# Patient Record
Sex: FEMALE | Race: BLACK OR AFRICAN AMERICAN | NOT HISPANIC OR LATINO | Employment: UNEMPLOYED | URBAN - METROPOLITAN AREA
[De-identification: names, ages, dates, MRNs, and addresses within clinical notes are randomized per-mention and may not be internally consistent; named-entity substitution may affect disease eponyms.]

---
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
## 2019-01-30 PROBLEM — A74.9 CHLAMYDIA: Status: ACTIVE | Noted: 2019-01-30

## 2019-02-25 ENCOUNTER — HOSPITAL ENCOUNTER (EMERGENCY)
Facility: HOSPITAL | Age: 19
Discharge: HOME | End: 2019-02-25
Attending: SURGERY
Payer: MEDICAID

## 2019-02-25 VITALS
TEMPERATURE: 98 F | HEART RATE: 88 BPM | SYSTOLIC BLOOD PRESSURE: 136 MMHG | RESPIRATION RATE: 17 BRPM | DIASTOLIC BLOOD PRESSURE: 74 MMHG

## 2019-02-25 DIAGNOSIS — Z20.2 POSSIBLE EXPOSURE TO STD: ICD-10-CM

## 2019-02-25 DIAGNOSIS — N89.8 VAGINAL DISCHARGE: Primary | ICD-10-CM

## 2019-02-25 LAB
B-HCG UR QL: NEGATIVE
BILIRUB UR QL STRIP: NEGATIVE
CLARITY UR: CLEAR
COLOR UR: YELLOW
GLUCOSE UR QL STRIP: NEGATIVE
HGB UR QL STRIP: NEGATIVE
KETONES UR QL STRIP: NEGATIVE
LEUKOCYTE ESTERASE UR QL STRIP: NEGATIVE
NITRITE UR QL STRIP: NEGATIVE
PH UR STRIP: 6 [PH] (ref 5–8)
PROT UR QL STRIP: NEGATIVE
SP GR UR STRIP: >=1.03 (ref 1–1.03)
URN SPEC COLLECT METH UR: ABNORMAL
UROBILINOGEN UR STRIP-ACNC: NEGATIVE EU/DL

## 2019-02-25 PROCEDURE — 81003 URINALYSIS AUTO W/O SCOPE: CPT

## 2019-02-25 PROCEDURE — 99284 EMERGENCY DEPT VISIT MOD MDM: CPT | Mod: 25

## 2019-02-25 PROCEDURE — 81025 URINE PREGNANCY TEST: CPT

## 2019-02-25 PROCEDURE — 63600175 PHARM REV CODE 636 W HCPCS: Performed by: NURSE PRACTITIONER

## 2019-02-25 PROCEDURE — 87070 CULTURE OTHR SPECIMN AEROBIC: CPT

## 2019-02-25 PROCEDURE — 96372 THER/PROPH/DIAG INJ SC/IM: CPT

## 2019-02-25 PROCEDURE — 25000003 PHARM REV CODE 250: Performed by: NURSE PRACTITIONER

## 2019-02-25 RX ORDER — METRONIDAZOLE 500 MG/1
500 TABLET ORAL EVERY 12 HOURS
Qty: 14 TABLET | Refills: 0 | Status: SHIPPED | OUTPATIENT
Start: 2019-02-25 | End: 2019-03-04

## 2019-02-25 RX ORDER — AZITHROMYCIN 250 MG/1
1000 TABLET, FILM COATED ORAL
Status: COMPLETED | OUTPATIENT
Start: 2019-02-25 | End: 2019-02-25

## 2019-02-25 RX ORDER — CEFTRIAXONE 500 MG/1
250 INJECTION, POWDER, FOR SOLUTION INTRAMUSCULAR; INTRAVENOUS
Status: COMPLETED | OUTPATIENT
Start: 2019-02-25 | End: 2019-02-25

## 2019-02-25 RX ADMIN — AZITHROMYCIN 1000 MG: 250 TABLET, FILM COATED ORAL at 02:02

## 2019-02-25 RX ADMIN — CEFTRIAXONE SODIUM 250 MG: 500 INJECTION, POWDER, FOR SOLUTION INTRAMUSCULAR; INTRAVENOUS at 02:02

## 2019-02-25 NOTE — ED NOTES
Discharged to home/self care.    - Condition at discharge: Good  - Mode of Discharge: Ambulatory  - The patient left the ED  By herself  - The discharge instructions were discussed with the patient.  - She stated an understanding of the discharge instructions.  - Walked pt to the discharge station.

## 2019-02-25 NOTE — ED PROVIDER NOTES
Encounter Date: 2/25/2019       History     Chief Complaint   Patient presents with    Vaginal Discharge     The history is provided by the patient.   Vaginal Discharge   This is a recurrent problem. Episode onset: Today. The problem has not changed since onset.Pertinent negatives include no chest pain, no abdominal pain, no headaches and no shortness of breath.     Yellow vaginal discharge. Patient reports that she was treated for gonorrhea and chlamydia last month.  She reports that her sex partner was also treated; however, she is requesting prophylactic treatment at this time.  She does not want to wait for her results to come back and states that she would rather get treated today.  She has no fever.  No tachycardia.  No abdominal pain. She had a pelvic exam performed by Ob/gyn last month.    Review of patient's allergies indicates:  No Known Allergies  Past Medical History:   Diagnosis Date    ADHD (attention deficit hyperactivity disorder)     Depression     History of psychiatric hospitalization     Hx of psychiatric care     Psychiatric problem     Suicide attempt     Therapy      No past surgical history on file.  No family history on file.  Social History     Tobacco Use    Smoking status: Current Some Day Smoker     Packs/day: 0.25    Smokeless tobacco: Never Used   Substance Use Topics    Alcohol use: No    Drug use: No     Review of Systems   Constitutional: Negative for fever.   HENT: Negative for congestion, ear pain, rhinorrhea, sore throat and trouble swallowing.    Eyes: Negative for pain, discharge, redness and visual disturbance.   Respiratory: Negative for cough, shortness of breath and wheezing.    Cardiovascular: Negative for chest pain and leg swelling.   Gastrointestinal: Negative for abdominal pain, constipation, diarrhea, nausea and vomiting.   Genitourinary: Positive for vaginal discharge (Yellow). Negative for difficulty urinating, dysuria, flank pain, frequency, genital  sores, pelvic pain, urgency and vaginal pain.   Musculoskeletal: Negative for arthralgias, back pain, myalgias and neck pain.   Skin: Negative for rash and wound.   Neurological: Negative for seizures, weakness and headaches.   Psychiatric/Behavioral: Negative.        Physical Exam     Initial Vitals [02/25/19 1325]   BP Pulse Resp Temp SpO2   138/78 91 20 97.6 °F (36.4 °C) --      MAP       --         Physical Exam    Nursing note and vitals reviewed.  Constitutional: No distress.   HENT:   Head: Normocephalic and atraumatic.   Right Ear: External ear normal.   Left Ear: External ear normal.   Nose: Nose normal.   Mouth/Throat: Oropharynx is clear and moist.   Eyes: Conjunctivae, EOM and lids are normal. Pupils are equal, round, and reactive to light.   Neck: Neck supple.   Cardiovascular: Normal rate, regular rhythm, S1 normal, S2 normal, normal heart sounds and intact distal pulses.   Pulmonary/Chest: Effort normal and breath sounds normal. No respiratory distress.   Abdominal: Soft. Bowel sounds are normal. There is no tenderness.   Genitourinary: There is no rash, tenderness or lesion on the right labia. There is no rash, tenderness or lesion on the left labia. Cervix exhibits discharge (Thin, gray, malodorous). Cervix exhibits no motion tenderness and no friability. Right adnexum displays no tenderness. Left adnexum displays no tenderness.   Musculoskeletal: Normal range of motion.   Neurological: She is alert and oriented to person, place, and time. She has normal strength. GCS eye subscore is 4. GCS verbal subscore is 5. GCS motor subscore is 6.   Skin: Skin is warm and dry. Capillary refill takes less than 2 seconds. No rash noted.   Psychiatric: She has a normal mood and affect. Her speech is normal and behavior is normal.         ED Course   Procedures  Labs Reviewed   URINALYSIS, REFLEX TO URINE CULTURE - Abnormal; Notable for the following components:       Result Value    Specific Gravity, UA >=1.030  (*)     All other components within normal limits    Narrative:     Preferred Collection Type->Urine, Clean Catch   GENITAL CULTURE   C. TRACHOMATIS/N. GONORRHOEAE BY AMP DNA   PREGNANCY TEST, URINE RAPID                      Medications   azithromycin tablet 1,000 mg (not administered)   cefTRIAXone injection 250 mg (not administered)                      Clinical Impression:       ICD-10-CM ICD-9-CM   1. Vaginal discharge N89.8 623.5   2. Possible exposure to STD Z20.2 V01.6     New Prescriptions    METRONIDAZOLE (FLAGYL) 500 MG TABLET    Take 1 tablet (500 mg total) by mouth every 12 (twelve) hours. for 7 days       Disposition:   Disposition: Discharged  Condition: Stable    The patient acknowledges that close follow up with medical provider is required. Instructed to follow up with gyn within 2 days. Patient was given specific return precautions. The patient agrees to comply with all instruction and directions given in the ER.                       Prerna Garcia NP  02/25/19 6889

## 2019-03-01 LAB — BACTERIA GENITAL AEROBE CULT: NORMAL

## 2019-08-13 ENCOUNTER — HOSPITAL ENCOUNTER (EMERGENCY)
Facility: HOSPITAL | Age: 19
Discharge: PSYCHIATRIC OR SUBSTANCE USE FACILITY | End: 2019-08-13
Attending: SURGERY
Payer: MEDICAID

## 2019-08-13 VITALS
BODY MASS INDEX: 32.65 KG/M2 | RESPIRATION RATE: 20 BRPM | WEIGHT: 196.19 LBS | HEART RATE: 78 BPM | SYSTOLIC BLOOD PRESSURE: 130 MMHG | DIASTOLIC BLOOD PRESSURE: 70 MMHG | TEMPERATURE: 97 F

## 2019-08-13 DIAGNOSIS — T14.91XA SUICIDAL BEHAVIOR WITH ATTEMPTED SELF-INJURY: Primary | ICD-10-CM

## 2019-08-13 PROBLEM — F32.A DEPRESSION: Status: ACTIVE | Noted: 2019-08-13

## 2019-08-13 LAB
ALBUMIN SERPL BCP-MCNC: 3.7 G/DL (ref 3.5–5.2)
ALP SERPL-CCNC: 63 U/L (ref 55–135)
ALT SERPL W/O P-5'-P-CCNC: 7 U/L (ref 10–44)
AMPHET+METHAMPHET UR QL: NEGATIVE
ANION GAP SERPL CALC-SCNC: 7 MMOL/L (ref 8–16)
APAP SERPL-MCNC: <3 UG/ML (ref 10–20)
AST SERPL-CCNC: 11 U/L (ref 10–40)
B-HCG UR QL: NEGATIVE
BACTERIA #/AREA URNS HPF: ABNORMAL /HPF
BARBITURATES UR QL SCN>200 NG/ML: NEGATIVE
BASOPHILS # BLD AUTO: 0.03 K/UL (ref 0–0.2)
BASOPHILS NFR BLD: 0.7 % (ref 0–1.9)
BENZODIAZ UR QL SCN>200 NG/ML: NEGATIVE
BILIRUB SERPL-MCNC: 0.5 MG/DL (ref 0.1–1)
BILIRUB UR QL STRIP: NEGATIVE
BUN SERPL-MCNC: 9 MG/DL (ref 6–20)
BZE UR QL SCN: NEGATIVE
CALCIUM SERPL-MCNC: 9.3 MG/DL (ref 8.7–10.5)
CANNABINOIDS UR QL SCN: NEGATIVE
CHLORIDE SERPL-SCNC: 108 MMOL/L (ref 95–110)
CLARITY UR: ABNORMAL
CO2 SERPL-SCNC: 24 MMOL/L (ref 23–29)
COLOR UR: YELLOW
CREAT SERPL-MCNC: 0.7 MG/DL (ref 0.5–1.4)
CREAT UR-MCNC: 210.9 MG/DL (ref 15–325)
DIFFERENTIAL METHOD: ABNORMAL
EOSINOPHIL # BLD AUTO: 0.1 K/UL (ref 0–0.5)
EOSINOPHIL NFR BLD: 2 % (ref 0–8)
ERYTHROCYTE [DISTWIDTH] IN BLOOD BY AUTOMATED COUNT: 14.7 % (ref 11.5–14.5)
EST. GFR  (AFRICAN AMERICAN): >60 ML/MIN/1.73 M^2
EST. GFR  (NON AFRICAN AMERICAN): >60 ML/MIN/1.73 M^2
ETHANOL SERPL-MCNC: <10 MG/DL
GLUCOSE SERPL-MCNC: 84 MG/DL (ref 70–110)
GLUCOSE UR QL STRIP: NEGATIVE
HCT VFR BLD AUTO: 31.4 % (ref 37–48.5)
HGB BLD-MCNC: 9.7 G/DL (ref 12–16)
HGB UR QL STRIP: ABNORMAL
IMM GRANULOCYTES # BLD AUTO: 0.01 K/UL (ref 0–0.04)
IMM GRANULOCYTES NFR BLD AUTO: 0.2 % (ref 0–0.5)
KETONES UR QL STRIP: NEGATIVE
LEUKOCYTE ESTERASE UR QL STRIP: NEGATIVE
LYMPHOCYTES # BLD AUTO: 1.2 K/UL (ref 1–4.8)
LYMPHOCYTES NFR BLD: 29 % (ref 18–48)
MCH RBC QN AUTO: 24.3 PG (ref 27–31)
MCHC RBC AUTO-ENTMCNC: 30.9 G/DL (ref 32–36)
MCV RBC AUTO: 79 FL (ref 82–98)
METHADONE UR QL SCN>300 NG/ML: NEGATIVE
MICROSCOPIC COMMENT: ABNORMAL
MONOCYTES # BLD AUTO: 0.3 K/UL (ref 0.3–1)
MONOCYTES NFR BLD: 7.2 % (ref 4–15)
NEUTROPHILS # BLD AUTO: 2.5 K/UL (ref 1.8–7.7)
NEUTROPHILS NFR BLD: 60.9 % (ref 38–73)
NITRITE UR QL STRIP: NEGATIVE
NRBC BLD-RTO: 0 /100 WBC
OPIATES UR QL SCN: NEGATIVE
PCP UR QL SCN>25 NG/ML: NEGATIVE
PH UR STRIP: 6 [PH] (ref 5–8)
PLATELET # BLD AUTO: 330 K/UL (ref 150–350)
PMV BLD AUTO: 10.2 FL (ref 9.2–12.9)
POTASSIUM SERPL-SCNC: 3.9 MMOL/L (ref 3.5–5.1)
PROT SERPL-MCNC: 7.5 G/DL (ref 6–8.4)
PROT UR QL STRIP: NEGATIVE
RBC # BLD AUTO: 3.99 M/UL (ref 4–5.4)
RBC #/AREA URNS HPF: >100 /HPF (ref 0–4)
SALICYLATES SERPL-MCNC: <5 MG/DL (ref 15–30)
SODIUM SERPL-SCNC: 139 MMOL/L (ref 136–145)
SP GR UR STRIP: >=1.03 (ref 1–1.03)
T4 FREE SERPL-MCNC: 0.89 NG/DL (ref 0.71–1.51)
TOXICOLOGY INFORMATION: NORMAL
TSH SERPL DL<=0.005 MIU/L-ACNC: 0.35 UIU/ML (ref 0.4–4)
URN SPEC COLLECT METH UR: ABNORMAL
UROBILINOGEN UR STRIP-ACNC: NEGATIVE EU/DL
WBC # BLD AUTO: 4.04 K/UL (ref 3.9–12.7)
WBC #/AREA URNS HPF: 0 /HPF (ref 0–5)

## 2019-08-13 PROCEDURE — 80320 DRUG SCREEN QUANTALCOHOLS: CPT

## 2019-08-13 PROCEDURE — 80307 DRUG TEST PRSMV CHEM ANLYZR: CPT

## 2019-08-13 PROCEDURE — 99285 EMERGENCY DEPT VISIT HI MDM: CPT

## 2019-08-13 PROCEDURE — 36415 COLL VENOUS BLD VENIPUNCTURE: CPT

## 2019-08-13 PROCEDURE — 81025 URINE PREGNANCY TEST: CPT

## 2019-08-13 PROCEDURE — 84443 ASSAY THYROID STIM HORMONE: CPT

## 2019-08-13 PROCEDURE — 81000 URINALYSIS NONAUTO W/SCOPE: CPT | Mod: 59

## 2019-08-13 PROCEDURE — 80053 COMPREHEN METABOLIC PANEL: CPT

## 2019-08-13 PROCEDURE — 85025 COMPLETE CBC W/AUTO DIFF WBC: CPT

## 2019-08-13 PROCEDURE — 80329 ANALGESICS NON-OPIOID 1 OR 2: CPT

## 2019-08-13 PROCEDURE — 84439 ASSAY OF FREE THYROXINE: CPT

## 2019-08-13 RX ORDER — HALOPERIDOL 5 MG/ML
5 INJECTION INTRAMUSCULAR EVERY 4 HOURS PRN
Status: DISCONTINUED | OUTPATIENT
Start: 2019-08-13 | End: 2019-08-13 | Stop reason: HOSPADM

## 2019-08-13 RX ORDER — DIPHENHYDRAMINE HYDROCHLORIDE 50 MG/ML
50 INJECTION INTRAMUSCULAR; INTRAVENOUS EVERY 4 HOURS PRN
Status: DISCONTINUED | OUTPATIENT
Start: 2019-08-13 | End: 2019-08-13 | Stop reason: HOSPADM

## 2019-08-13 RX ORDER — LORAZEPAM 2 MG/ML
2 INJECTION INTRAMUSCULAR EVERY 4 HOURS PRN
Status: DISCONTINUED | OUTPATIENT
Start: 2019-08-13 | End: 2019-08-13 | Stop reason: HOSPADM

## 2019-08-13 NOTE — ED NOTES
Patient has been accepted to [facility] by Dr. Park.  EMS has been called.  Patient has been notified.  Patient has no questions or concerns at this time.

## 2019-08-13 NOTE — ED PROVIDER NOTES
Encounter Date: 8/13/2019       History     Chief Complaint   Patient presents with    Suicidal     Patient is a 19-year-old black female with significant clinical depression.  She attempted to hang herself in [place] 3 days ago, was released.  She has had suicidal attempt here before and was admitted as Psychiatric inpatient.  She states that she has intent to kill herself today.        Review of patient's allergies indicates:  No Known Allergies  Past Medical History:   Diagnosis Date    ADHD (attention deficit hyperactivity disorder)     Depression     History of psychiatric hospitalization     Hx of psychiatric care     Psychiatric problem     Suicide attempt     Therapy      History reviewed. No pertinent surgical history.  History reviewed. No pertinent family history.  Social History     Tobacco Use    Smoking status: Current Some Day Smoker     Packs/day: 0.25    Smokeless tobacco: Never Used   Substance Use Topics    Alcohol use: No    Drug use: No     Review of Systems   Unable to perform ROS: Psychiatric disorder       Physical Exam     Initial Vitals [08/13/19 0900]   BP Pulse Resp Temp SpO2   134/72 77 18 96.4 °F (35.8 °C) --      MAP       --         Physical Exam    Nursing note and vitals reviewed.  Constitutional: No distress.   HENT:   Head: Normocephalic and atraumatic.   Nose: Nose normal.   Mouth/Throat: Oropharynx is clear and moist.   Eyes: Conjunctivae and EOM are normal. Pupils are equal, round, and reactive to light.   Neck: Normal range of motion. Neck supple.   Cardiovascular: Normal rate, regular rhythm, normal heart sounds and intact distal pulses.   Pulmonary/Chest: Breath sounds normal.   Abdominal: Soft. Bowel sounds are normal. There is no tenderness.   Musculoskeletal: Normal range of motion.   Neurological: She is alert and oriented to person, place, and time. She has normal strength.   Skin: Skin is warm and dry.   Psychiatric:   Depressed affect.  Suicidal intent  voiced.         ED Course   Procedures  Labs Reviewed - No data to display       Imaging Results    None         PATIENT IS MEDICALLY CLEARED FOR TRANSFER TO THE PSYCHIATRIC TREATMENT FACILITY.                       Clinical Impression:       ICD-10-CM ICD-9-CM   1. Suicidal behavior with attempted self-injury T14.91XA 300.9         Disposition:   Disposition: Transferred  Condition: Stable                        Miki Romero Jr., MD  08/13/19 1005

## 2019-08-13 NOTE — ED TRIAGE NOTES
"Patient presents to the ER today needing mental health evaluation.  Patient had a suicide attempt 3 days ago by trying to hang herself.  Patient has been noncompliant with meds.  Patient reports trying to hang herself with a scarf.  Patient has left eye redness secondary to the suicide attempt.  Patient very quiet and not giving very much information.  Mother reports that the attempt happened in [place] at a "[employer]" and patient was sent to a local ER and discharged.  Mother brought her here for evaluation.  Patient calm and cooperative at this time, denying any SI or HI.    "

## 2019-08-13 NOTE — ED NOTES
Patient is in room 3A, was placed in paper scrubs and all cords and wires have been removed. The patient has signed their mental health rights and they have been placed in the chart. All the patient's belongings have been removed, labeled and locked in the belonging's closet. The PEC has been completed, signed, dated and placed in the patient's chart. There is a sitter, Augusta Edmonds, at the bedside with direct visual contact doing 15 minute observations and they have no belongings in the room. If family/vistors are present, they have also been made aware of the no belongings policy and have stated their understanding. The SADD tool was done on intake. The Psych hold orders have been signed, dated and placed in the chart. Vital signs are being done per orders.

## 2019-08-25 ENCOUNTER — HOSPITAL ENCOUNTER (EMERGENCY)
Facility: HOSPITAL | Age: 19
Discharge: HOME | End: 2019-08-25
Attending: SURGERY
Payer: MEDICAID

## 2019-08-25 VITALS
BODY MASS INDEX: 32.58 KG/M2 | DIASTOLIC BLOOD PRESSURE: 66 MMHG | TEMPERATURE: 98 F | SYSTOLIC BLOOD PRESSURE: 113 MMHG | RESPIRATION RATE: 20 BRPM | HEART RATE: 73 BPM | OXYGEN SATURATION: 100 % | WEIGHT: 201.81 LBS

## 2019-08-25 DIAGNOSIS — R51.9 NONINTRACTABLE HEADACHE, UNSPECIFIED CHRONICITY PATTERN, UNSPECIFIED HEADACHE TYPE: ICD-10-CM

## 2019-08-25 DIAGNOSIS — F41.9 ANXIETY: Primary | ICD-10-CM

## 2019-08-25 PROCEDURE — 99284 EMERGENCY DEPT VISIT MOD MDM: CPT | Mod: 25

## 2019-08-25 PROCEDURE — 25000003 PHARM REV CODE 250: Performed by: SURGERY

## 2019-08-25 RX ORDER — ACETAMINOPHEN 500 MG
1000 TABLET ORAL
Status: COMPLETED | OUTPATIENT
Start: 2019-08-25 | End: 2019-08-25

## 2019-08-25 RX ORDER — IBUPROFEN 800 MG/1
800 TABLET ORAL
Status: COMPLETED | OUTPATIENT
Start: 2019-08-25 | End: 2019-08-25

## 2019-08-25 RX ORDER — IBUPROFEN 800 MG/1
800 TABLET ORAL EVERY 6 HOURS PRN
Qty: 20 TABLET | Refills: 0 | Status: ON HOLD | OUTPATIENT
Start: 2019-08-25 | End: 2019-12-03

## 2019-08-25 RX ADMIN — ACETAMINOPHEN 1000 MG: 500 TABLET, FILM COATED ORAL at 11:08

## 2019-08-25 RX ADMIN — IBUPROFEN 800 MG: 800 TABLET ORAL at 11:08

## 2019-08-26 NOTE — ED PROVIDER NOTES
[facility]                                                 Chief Complaint  19 y.o. female with Headache    History of Present Illness  Tammy Lopez presents to the emergency room with headache tonight  Patient has had on again off again headache for last 2 weeks per interview  Patient states she attempted to hang herself 2 weeks ago, was under PEC  Patient went to mental health facility for 2 weeks, feels much better after DC  Pt however states she has a residual headache from the initial hang attempt  Patient has a normal neuro exam, no neck bruising, no head bruising noted  After long discussion with the patient, she would like head CT in the ER now  She is not suicidal or homicidal nor danger to herself or others on interview  States she lives with anxiety and stress, makes her headache worse recently    The history is provided by the patient   device was not used during this ER visit  Medical history: ADHD and depression  History reviewed. No pertinent surgical history.   No Known Allergies   History reviewed. No pertinent family history.    I have reviewed all of this patient's past medical, surgical, family, and social   histories as well as active allergies and medications documented in the  electronic medical record    Review of Systems and Physical Exam      Review of Systems  -- Constitution - no fever, denies fatigue, no weakness, no chills  -- Eyes - no tearing or redness, no visual disturbance  -- Ear, Nose - no tinnitus or earache, no nasal congestion or discharge  -- Mouth,Throat - no sore throat, no toothache, normal voice, normal swallowing  -- Respiratory - denies cough and congestion, no shortness of breath, no DELUCA  -- Cardiovascular - denies chest pain, no palpitations, denies claudication  -- Gastrointestinal - denies abdominal pain, nausea, vomiting, or diarrhea  -- Genitourinary - no dysuria, no hematuria, no flank pain, no bladder pain  --  Musculoskeletal - denies back pain, negative for trauma or injury  -- Neurological - headache, denies weakness or seizure; no LOC  -- Skin - denies pallor, rash, or changes in skin. no hives or welts noted    Vital Signs  Her tympanic temperature is 97.5 °F (36.4 °C).   Her blood pressure is 113/66 and her pulse is 73.   Her respiration is 20 and oxygen saturation is 100%.     Physical Exam  -- Nursing note and vitals reviewed  -- Constitutional: Appears well-developed and well-nourished  -- Head: Atraumatic. Normocephalic. No obvious abnormality  -- Eyes: Pupils are equal and reactive to light. Normal conjunctiva and lids  -- Nose: Nose normal in appearance, nares grossly normal. No discharge  -- Throat: Mucous membranes moist, pharynx normal, normal tonsils. No lesions   -- Ears: External ears and TM normal bilaterally. Normal hearing and no drainage  -- Neck: Normal range of motion. Neck supple. No masses, trachea midline  -- Cardiac: Normal rate, regular rhythm and normal heart sounds  -- Pulmonary: Normal respiratory effort, breath sounds clear to auscultation  -- Abdominal: Soft, no tenderness. Normal bowel sounds. Normal liver edge  -- Musculoskeletal: Normal range of motion, no effusions. Joints stable   -- Neurological: No focal deficits. Showed good interaction with staff  -- Skin: Warm and dry. No evidence of rash or cellulitis    Emergency Room Course      Treatment and Evaluation  -- The CT of the head performed in the ER today was negative for acute pathology  -- PO 1 g Tylenol given in today in the ER  --  mg Motrin given in today in the ER     Diagnosis  -- Anxiety  -- Nonintractable headache    Disposition and Plan  -- Disposition: home  -- Condition: stable  -- Follow-up: Patient to follow up with Sherice Ross NP in 1-2 days.  -- I advised the patient that we have found no life threatening condition today  -- At this time, I believe the patient is clinically stable for discharge.    -- The patient acknowledges that close follow up with a MD is required   -- Patient agrees to comply with all instruction and direction given in the ER    This note is dictated on M*Modal word recognition program.  There are word recognition mistakes that are occasionally missed on review.          Graham Goff MD  08/25/19 2742

## 2019-08-26 NOTE — ED TRIAGE NOTES
Patient reports she has frontal headaches. She reports last week she tried to hang herself. She reports she was admitted to a U unit was recently discharged. Patient denies current SI at this exact time.

## 2019-09-10 ENCOUNTER — TELEPHONE (OUTPATIENT)
Dept: OBSTETRICS AND GYNECOLOGY | Facility: CLINIC | Age: 19
End: 2019-09-10

## 2019-09-10 ENCOUNTER — HOSPITAL ENCOUNTER (EMERGENCY)
Facility: HOSPITAL | Age: 19
Discharge: HOME | End: 2019-09-10
Attending: SURGERY
Payer: MEDICAID

## 2019-09-10 VITALS
OXYGEN SATURATION: 100 % | TEMPERATURE: 98 F | DIASTOLIC BLOOD PRESSURE: 84 MMHG | WEIGHT: 204.5 LBS | BODY MASS INDEX: 32.87 KG/M2 | SYSTOLIC BLOOD PRESSURE: 140 MMHG | HEART RATE: 73 BPM | HEIGHT: 66 IN | RESPIRATION RATE: 18 BRPM

## 2019-09-10 DIAGNOSIS — Z32.02 PREGNANCY EXAMINATION OR TEST, NEGATIVE RESULT: ICD-10-CM

## 2019-09-10 DIAGNOSIS — Z20.2 EXPOSURE TO STD: Primary | ICD-10-CM

## 2019-09-10 LAB
B-HCG UR QL: NEGATIVE
BACTERIA #/AREA URNS HPF: ABNORMAL /HPF
BILIRUB UR QL STRIP: NEGATIVE
CLARITY UR: CLEAR
COLOR UR: YELLOW
GLUCOSE UR QL STRIP: NEGATIVE
HGB UR QL STRIP: ABNORMAL
KETONES UR QL STRIP: NEGATIVE
LEUKOCYTE ESTERASE UR QL STRIP: ABNORMAL
MICROSCOPIC COMMENT: ABNORMAL
NITRITE UR QL STRIP: NEGATIVE
PH UR STRIP: 7 [PH] (ref 5–8)
PROT UR QL STRIP: NEGATIVE
RBC #/AREA URNS HPF: 2 /HPF (ref 0–4)
SP GR UR STRIP: 1.02 (ref 1–1.03)
SQUAMOUS #/AREA URNS HPF: 5 /HPF
URN SPEC COLLECT METH UR: ABNORMAL
UROBILINOGEN UR STRIP-ACNC: NEGATIVE EU/DL
WBC #/AREA URNS HPF: 6 /HPF (ref 0–5)

## 2019-09-10 PROCEDURE — 99284 EMERGENCY DEPT VISIT MOD MDM: CPT | Mod: 25

## 2019-09-10 PROCEDURE — 25000003 PHARM REV CODE 250: Performed by: NURSE PRACTITIONER

## 2019-09-10 PROCEDURE — 81000 URINALYSIS NONAUTO W/SCOPE: CPT

## 2019-09-10 PROCEDURE — 63600175 PHARM REV CODE 636 W HCPCS: Performed by: NURSE PRACTITIONER

## 2019-09-10 PROCEDURE — 81025 URINE PREGNANCY TEST: CPT

## 2019-09-10 PROCEDURE — 87491 CHLMYD TRACH DNA AMP PROBE: CPT

## 2019-09-10 PROCEDURE — 96372 THER/PROPH/DIAG INJ SC/IM: CPT

## 2019-09-10 RX ORDER — AZITHROMYCIN 250 MG/1
1000 TABLET, FILM COATED ORAL
Status: COMPLETED | OUTPATIENT
Start: 2019-09-10 | End: 2019-09-10

## 2019-09-10 RX ORDER — CEFTRIAXONE 250 MG/1
250 INJECTION, POWDER, FOR SOLUTION INTRAMUSCULAR; INTRAVENOUS
Status: COMPLETED | OUTPATIENT
Start: 2019-09-10 | End: 2019-09-10

## 2019-09-10 RX ADMIN — AZITHROMYCIN 1000 MG: 250 TABLET, FILM COATED ORAL at 01:09

## 2019-09-10 RX ADMIN — CEFTRIAXONE SODIUM 250 MG: 250 INJECTION, POWDER, FOR SOLUTION INTRAMUSCULAR; INTRAVENOUS at 01:09

## 2019-09-10 NOTE — ED PROVIDER NOTES
"Encounter Date: 9/10/2019       History     Chief Complaint   Patient presents with    Vaginal Bleeding     The history is provided by the patient.   Female  Problem   Primary symptoms include vaginal bleeding.    Primary symptoms include no fever, no dysuria.   Primary symptoms comment: "spotting;" presents requesting pregnancy test.. This is a new problem. The current episode started yesterday. The problem occurs intermittently. The problem has been unchanged. She is not pregnant (Patient reports that she is unsure if she is pregnant, she reports last menstrual period approximately 3 weeks ago.  She reports that she is not due to start until August 13th, and is uncommon for her to have spotting.  She reports recent unprotected sex,). LMP: X 3 weeks ago.  The patient's menstrual history has been regular. The discharge was scant (pink). Pertinent negatives include no anorexia, no diaphoresis, no abdominal swelling, no abdominal pain, no constipation, no diarrhea, no nausea, no vomiting, no frequency, no light-headedness and no dizziness. Associated symptoms comments: Denies urinary symptoms, or abdominal pain.. Sexual activity: sexually active. There is a concern regarding sexually transmitted diseases. (Patient reports possible exposure to STD, denies vaginal discharge, lesions, or odor.) She uses nothing for contraception. Associated medical issues do not include STD.     Review of patient's allergies indicates:  No Known Allergies  Past Medical History:   Diagnosis Date    ADHD (attention deficit hyperactivity disorder)     Depression     History of psychiatric hospitalization     Hx of psychiatric care     Psychiatric problem     Suicide attempt     Therapy      History reviewed. No pertinent surgical history.  History reviewed. No pertinent family history.  Social History     Tobacco Use    Smoking status: Current Some Day Smoker     Packs/day: 0.25    Smokeless tobacco: Never Used   Substance Use " Topics    Alcohol use: No    Drug use: Yes     Types: Marijuana     Review of Systems   Constitutional: Negative.  Negative for activity change, chills, diaphoresis and fever.   HENT: Negative.  Negative for congestion, ear discharge, ear pain, postnasal drip, sinus pressure, sinus pain and sore throat.    Eyes: Negative.    Respiratory: Negative.  Negative for cough, chest tightness and shortness of breath.    Cardiovascular: Negative.  Negative for chest pain.   Gastrointestinal: Negative.  Negative for abdominal distention, abdominal pain, anorexia, constipation, diarrhea, nausea and vomiting.   Endocrine: Negative.    Genitourinary: Positive for vaginal bleeding. Negative for dysuria, frequency and urgency.        Exposure to STD, denies symptoms.   Musculoskeletal: Negative.  Negative for back pain.   Skin: Negative.  Negative for rash.   Allergic/Immunologic: Negative.    Neurological: Negative.  Negative for dizziness, weakness, light-headedness and numbness.   Hematological: Negative.  Does not bruise/bleed easily.   Psychiatric/Behavioral: Negative.        Physical Exam     Initial Vitals [09/10/19 1246]   BP Pulse Resp Temp SpO2   136/81 86 18 97.7 °F (36.5 °C) 100 %      MAP       --         Physical Exam    Nursing note and vitals reviewed.  Constitutional: She appears well-developed and well-nourished.   HENT:   Head: Normocephalic and atraumatic.   Right Ear: External ear normal.   Left Ear: External ear normal.   Nose: Nose normal.   Mouth/Throat: Uvula is midline, oropharynx is clear and moist and mucous membranes are normal.   Eyes: Conjunctivae and EOM are normal. Pupils are equal, round, and reactive to light.   Neck: Normal range of motion. Neck supple.   Cardiovascular: Normal rate, regular rhythm, normal heart sounds and intact distal pulses.   Pulmonary/Chest: Effort normal and breath sounds normal. She has no decreased breath sounds. She has no wheezes. She has no rhonchi. She has no rales.    Abdominal: Soft. Bowel sounds are normal. There is no tenderness.   Genitourinary: Rectum normal, vagina normal and uterus normal. There is no rash, tenderness or lesion on the right labia. There is no rash, tenderness or lesion on the left labia. Cervix exhibits no motion tenderness, no discharge and no friability. Right adnexum displays no mass and no tenderness. Left adnexum displays no mass and no tenderness. No erythema, tenderness or bleeding in the vagina. No foreign body in the vagina. No signs of injury around the vagina. No vaginal discharge found.   Musculoskeletal: Normal range of motion.   Neurological: She is alert and oriented to person, place, and time. She has normal strength. She displays normal reflexes. No cranial nerve deficit or sensory deficit.   Skin: Skin is warm and dry. Capillary refill takes less than 2 seconds. No rash noted.   Psychiatric: She has a normal mood and affect. Her behavior is normal. Judgment and thought content normal.         ED Course   Procedures  Labs Reviewed   URINALYSIS, REFLEX TO URINE CULTURE - Abnormal; Notable for the following components:       Result Value    Occult Blood UA 1+ (*)     Leukocytes, UA 1+ (*)     All other components within normal limits    Narrative:     Preferred Collection Type->Urine, Clean Catch   URINALYSIS MICROSCOPIC - Abnormal; Notable for the following components:    WBC, UA 6 (*)     Bacteria Few (*)     All other components within normal limits    Narrative:     Preferred Collection Type->Urine, Clean Catch   C. TRACHOMATIS/N. GONORRHOEAE BY AMP DNA   PREGNANCY TEST, URINE RAPID          Imaging Results    None           Medications   azithromycin tablet 1,000 mg (1,000 mg Oral Given 9/10/19 1321)   cefTRIAXone injection 250 mg (250 mg Intramuscular Given 9/10/19 1321)                          Clinical Impression:       ICD-10-CM ICD-9-CM   1. Exposure to STD Z20.2 V01.6   2. Pregnancy examination or test, negative result Z32.02  V72.41         Disposition:   Disposition: Discharged  Condition: Stable    The patient acknowledges that close follow up with medical provider is required. Instructed to follow up with PCP within 2 days. Patient was given specific return precautions. The patient agrees to comply with all instruction and directions given in the ER.                     Cynthia Sen NP  09/10/19 1842

## 2019-09-10 NOTE — ED NOTES
The patient was seen, evaluated and discharged. All questions were asked and/or answered and the pt was discharged with written and verbal instructions.  Discharged to home/self care.    - Condition at discharge: Good  - Mode of Discharge: Ambulatory  - The patient left the ED  - The discharge instructions were discussed with the patient.  - Patient state an understanding of the discharge instructions.  - Walked pt to the discharge station.

## 2019-09-10 NOTE — TELEPHONE ENCOUNTER
----- Message from Ginny Higgins MA sent at 9/10/2019 11:58 AM CDT -----  Contact: self  Tammy Lopez  MRN: [id number]  Home Phone      [phone]  Work Phone      Not on file.  Mobile          Not on file.    Patient Care Team:  Sherice Ross, NP as PCP - General (Family Medicine)  OB? No  What phone number can you be reached at?   [phone]  Message:   Needs to make appt for irrg bleeding.  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

## 2019-09-11 LAB
C TRACH DNA SPEC QL NAA+PROBE: NOT DETECTED
N GONORRHOEA DNA SPEC QL NAA+PROBE: NOT DETECTED

## 2019-09-12 ENCOUNTER — LAB VISIT (OUTPATIENT)
Dept: LAB | Facility: HOSPITAL | Age: 19
End: 2019-09-12
Attending: NURSE PRACTITIONER
Payer: MEDICAID

## 2019-09-12 DIAGNOSIS — E61.1 IRON DEFICIENCY: Primary | ICD-10-CM

## 2019-09-12 LAB
BASOPHILS # BLD AUTO: 0.02 K/UL (ref 0–0.2)
BASOPHILS NFR BLD: 0.5 % (ref 0–1.9)
DIFFERENTIAL METHOD: ABNORMAL
EOSINOPHIL # BLD AUTO: 0.1 K/UL (ref 0–0.5)
EOSINOPHIL NFR BLD: 2.7 % (ref 0–8)
ERYTHROCYTE [DISTWIDTH] IN BLOOD BY AUTOMATED COUNT: 14.9 % (ref 11.5–14.5)
FERRITIN SERPL-MCNC: <1 NG/ML (ref 20–300)
HCT VFR BLD AUTO: 32.4 % (ref 37–48.5)
HGB BLD-MCNC: 9.7 G/DL (ref 12–16)
IMM GRANULOCYTES # BLD AUTO: 0.01 K/UL (ref 0–0.04)
IMM GRANULOCYTES NFR BLD AUTO: 0.2 % (ref 0–0.5)
IRON SERPL-MCNC: 58 UG/DL (ref 30–160)
LYMPHOCYTES # BLD AUTO: 1.3 K/UL (ref 1–4.8)
LYMPHOCYTES NFR BLD: 30.7 % (ref 18–48)
MCH RBC QN AUTO: 24.6 PG (ref 27–31)
MCHC RBC AUTO-ENTMCNC: 29.9 G/DL (ref 32–36)
MCV RBC AUTO: 82 FL (ref 82–98)
MONOCYTES # BLD AUTO: 0.3 K/UL (ref 0.3–1)
MONOCYTES NFR BLD: 7.5 % (ref 4–15)
NEUTROPHILS # BLD AUTO: 2.4 K/UL (ref 1.8–7.7)
NEUTROPHILS NFR BLD: 58.4 % (ref 38–73)
NRBC BLD-RTO: 0 /100 WBC
PLATELET # BLD AUTO: 334 K/UL (ref 150–350)
PMV BLD AUTO: 10.1 FL (ref 9.2–12.9)
RBC # BLD AUTO: 3.95 M/UL (ref 4–5.4)
SATURATED IRON: 11 % (ref 20–50)
TOTAL IRON BINDING CAPACITY: 528 UG/DL (ref 250–450)
TRANSFERRIN SERPL-MCNC: 357 MG/DL (ref 200–375)
WBC # BLD AUTO: 4.11 K/UL (ref 3.9–12.7)

## 2019-09-12 PROCEDURE — 85025 COMPLETE CBC W/AUTO DIFF WBC: CPT

## 2019-09-12 PROCEDURE — 36415 COLL VENOUS BLD VENIPUNCTURE: CPT

## 2019-09-12 PROCEDURE — 82728 ASSAY OF FERRITIN: CPT

## 2019-09-12 PROCEDURE — 83540 ASSAY OF IRON: CPT

## 2019-09-30 ENCOUNTER — HOSPITAL ENCOUNTER (EMERGENCY)
Facility: HOSPITAL | Age: 19
Discharge: HOME | End: 2019-09-30
Attending: SURGERY
Payer: MEDICAID

## 2019-09-30 VITALS
HEIGHT: 66 IN | TEMPERATURE: 99 F | DIASTOLIC BLOOD PRESSURE: 82 MMHG | HEART RATE: 88 BPM | OXYGEN SATURATION: 100 % | BODY MASS INDEX: 32.92 KG/M2 | WEIGHT: 204.81 LBS | SYSTOLIC BLOOD PRESSURE: 131 MMHG | RESPIRATION RATE: 16 BRPM

## 2019-09-30 DIAGNOSIS — N89.8 VAGINAL DISCHARGE: Primary | ICD-10-CM

## 2019-09-30 LAB
AMPHET+METHAMPHET UR QL: NEGATIVE
B-HCG UR QL: NEGATIVE
BACTERIA #/AREA URNS HPF: ABNORMAL /HPF
BARBITURATES UR QL SCN>200 NG/ML: NEGATIVE
BENZODIAZ UR QL SCN>200 NG/ML: NEGATIVE
BILIRUB UR QL STRIP: NEGATIVE
BZE UR QL SCN: NEGATIVE
CANNABINOIDS UR QL SCN: NEGATIVE
CLARITY UR: CLEAR
COLOR UR: YELLOW
CREAT UR-MCNC: 206.9 MG/DL (ref 15–325)
GLUCOSE UR QL STRIP: NEGATIVE
HGB UR QL STRIP: NEGATIVE
KETONES UR QL STRIP: NEGATIVE
LEUKOCYTE ESTERASE UR QL STRIP: ABNORMAL
METHADONE UR QL SCN>300 NG/ML: NEGATIVE
MICROSCOPIC COMMENT: ABNORMAL
NITRITE UR QL STRIP: NEGATIVE
OPIATES UR QL SCN: NEGATIVE
PCP UR QL SCN>25 NG/ML: NEGATIVE
PH UR STRIP: 6 [PH] (ref 5–8)
PROT UR QL STRIP: NEGATIVE
RBC #/AREA URNS HPF: 0 /HPF (ref 0–4)
SP GR UR STRIP: >=1.03 (ref 1–1.03)
SQUAMOUS #/AREA URNS HPF: 25 /HPF
TOXICOLOGY INFORMATION: NORMAL
URN SPEC COLLECT METH UR: ABNORMAL
UROBILINOGEN UR STRIP-ACNC: NEGATIVE EU/DL
WBC #/AREA URNS HPF: 20 /HPF (ref 0–5)

## 2019-09-30 PROCEDURE — 81000 URINALYSIS NONAUTO W/SCOPE: CPT | Mod: 59

## 2019-09-30 PROCEDURE — 87491 CHLMYD TRACH DNA AMP PROBE: CPT

## 2019-09-30 PROCEDURE — 81025 URINE PREGNANCY TEST: CPT

## 2019-09-30 PROCEDURE — 63600175 PHARM REV CODE 636 W HCPCS: Performed by: SURGERY

## 2019-09-30 PROCEDURE — 25000003 PHARM REV CODE 250: Performed by: SURGERY

## 2019-09-30 PROCEDURE — 80307 DRUG TEST PRSMV CHEM ANLYZR: CPT

## 2019-09-30 PROCEDURE — 99284 EMERGENCY DEPT VISIT MOD MDM: CPT | Mod: 25

## 2019-09-30 PROCEDURE — 87086 URINE CULTURE/COLONY COUNT: CPT

## 2019-09-30 PROCEDURE — 96372 THER/PROPH/DIAG INJ SC/IM: CPT

## 2019-09-30 RX ORDER — AZITHROMYCIN 250 MG/1
1000 TABLET, FILM COATED ORAL
Status: COMPLETED | OUTPATIENT
Start: 2019-09-30 | End: 2019-09-30

## 2019-09-30 RX ORDER — NITROFURANTOIN 25; 75 MG/1; MG/1
100 CAPSULE ORAL 2 TIMES DAILY
Qty: 14 CAPSULE | Refills: 0 | Status: SHIPPED | OUTPATIENT
Start: 2019-09-30 | End: 2019-10-07

## 2019-09-30 RX ORDER — CEFTRIAXONE 250 MG/1
250 INJECTION, POWDER, FOR SOLUTION INTRAMUSCULAR; INTRAVENOUS
Status: COMPLETED | OUTPATIENT
Start: 2019-09-30 | End: 2019-09-30

## 2019-09-30 RX ORDER — FLUCONAZOLE 100 MG/1
100 TABLET ORAL DAILY
Qty: 5 TABLET | Refills: 0 | Status: SHIPPED | OUTPATIENT
Start: 2019-09-30 | End: 2019-10-05

## 2019-09-30 RX ORDER — METRONIDAZOLE 500 MG/1
2000 TABLET ORAL
Status: COMPLETED | OUTPATIENT
Start: 2019-09-30 | End: 2019-09-30

## 2019-09-30 RX ORDER — METRONIDAZOLE 500 MG/1
500 TABLET ORAL 4 TIMES DAILY
Qty: 28 TABLET | Refills: 0 | Status: SHIPPED | OUTPATIENT
Start: 2019-09-30 | End: 2019-10-07

## 2019-09-30 RX ADMIN — METRONIDAZOLE 2000 MG: 500 TABLET, FILM COATED ORAL at 10:09

## 2019-09-30 RX ADMIN — AZITHROMYCIN 1000 MG: 250 TABLET, FILM COATED ORAL at 10:09

## 2019-09-30 RX ADMIN — CEFTRIAXONE SODIUM 250 MG: 250 INJECTION, POWDER, FOR SOLUTION INTRAMUSCULAR; INTRAVENOUS at 10:09

## 2019-09-30 NOTE — ED TRIAGE NOTES
Patient reports vaginal discharge with intermittent stomach cramping x 1 week. Patient denies pain at this time. Patient unsure if she is pregnant.

## 2019-09-30 NOTE — ED PROVIDER NOTES
[facility]                                                 Chief Complaint  19 y.o. female with Vaginal Discharge    History of Present Illness  Tammy Lopez presents to the emergency room with vaginal discharge  Patient has had longstanding issues with vaginal discharge, history of STD  Patient states she does not want a male doing a pelvic exam in the ER today  Patient states she has a whitish vaginal discharge consistent with previous BV  Medical student did a precursor exam and saw cottage cheese drainage now  Patient does not think she has gonorrhea chlamydia, does not object to testing  Patient will follow up with her OBGYN for definitive pelvic exam this next week    The history is provided by the patient   device was not used during this ER visit  Medical history: ADHD and depression  History reviewed. No pertinent surgical history.   No Known Allergies   History reviewed. No pertinent family history.    I have reviewed all of this patient's past medical, surgical, family, and social   histories as well as active allergies and medications documented in the  electronic medical record    Review of Systems and Physical Exam      Review of Systems  -- Constitution - no fever, denies fatigue, no weakness, no chills  -- Eyes - no tearing or redness, no visual disturbance  -- Ear, Nose - no tinnitus or earache, no nasal congestion or discharge  -- Mouth,Throat - no sore throat, no toothache, normal voice, normal swallowing  -- Respiratory - denies cough and congestion, no shortness of breath, no DELUCA  -- Cardiovascular - denies chest pain, no palpitations, denies claudication  -- Gastrointestinal - denies abdominal pain, nausea, vomiting, or diarrhea  -- Genitourinary - vaginal discharge with long history of bacterial vaginosis  -- Musculoskeletal - denies back pain, negative for trauma or injury  -- Neurological - no headache, denies weakness or seizure; no LOC  -- Skin  - denies pallor, rash, or changes in skin. no hives or welts noted  -- Psychiatric - Denies SI or HI, no psychosis or fractured thought noted     Vital Signs  Her oral temperature is 98.7 °F (37.1 °C).   Her blood pressure is 131/82 and her pulse is 88.   Her respiration is 16 and oxygen saturation is 100%.     Physical Exam  -- Nursing note and vitals reviewed  -- Constitutional: Appears well-developed and well-nourished  -- Head: Atraumatic. Normocephalic. No obvious abnormality  -- Eyes: Pupils are equal and reactive to light. Normal conjunctiva and lids  -- Cardiac: Normal rate, regular rhythm and normal heart sounds  -- Pulmonary: Normal respiratory effort, breath sounds clear to auscultation  -- Abdominal: Soft, no tenderness. Normal bowel sounds. Normal liver edge  -- Genitourinary: no flank pain on exam, no suprapubic pain by palpation   -- Musculoskeletal: Normal range of motion, no effusions. Joints stable   -- Neurological: No focal deficits. Showed good interaction with staff  -- Vascular: Posterior tibial, dorsalis pedis and radial pulses 2+ bilaterally      Emergency Room Course      Treatment and Evaluation  -- Urinalysis performed during this ER visit showed signs of infection  -- The urine today has been sent for lab culture, results pending   -- The urine pregnancy test today was negative; patient informed of the results  -- Gonorrhea and chlamydia urine cultures are pending  -- Counseled on safe sex and treated with Zithromax and Rocephin  -- 2 grams of p.o. Flagyl given in the ER today    Diagnosis  -- The encounter diagnosis was Vaginal discharge.    Disposition and Plan  -- Disposition: home  -- Condition: stable  -- Follow-up: Patient to follow up with Sherice Ross NP in 1-2 days.  -- I advised the patient that we have found no life threatening condition today  -- At this time, I believe the patient is clinically stable for discharge.   -- The patient acknowledges that close  follow up with a MD is required   -- Patient agrees to comply with all instruction and direction given in the ER    This note is dictated on M*Modal word recognition program.  There are word recognition mistakes that are occasionally missed on review.         Graham Goff MD  09/30/19 2369

## 2019-10-01 LAB
BACTERIA UR CULT: NO GROWTH
C TRACH DNA SPEC QL NAA+PROBE: NOT DETECTED
N GONORRHOEA DNA SPEC QL NAA+PROBE: NOT DETECTED

## 2019-10-30 ENCOUNTER — OFFICE VISIT (OUTPATIENT)
Dept: OBSTETRICS AND GYNECOLOGY | Facility: CLINIC | Age: 19
End: 2019-10-30
Payer: MEDICAID

## 2019-10-30 VITALS
RESPIRATION RATE: 17 BRPM | WEIGHT: 207.81 LBS | HEIGHT: 66 IN | HEART RATE: 80 BPM | BODY MASS INDEX: 33.4 KG/M2 | DIASTOLIC BLOOD PRESSURE: 78 MMHG | SYSTOLIC BLOOD PRESSURE: 120 MMHG

## 2019-10-30 DIAGNOSIS — N89.8 VAGINAL DISCHARGE: Primary | ICD-10-CM

## 2019-10-30 DIAGNOSIS — N89.8 VAGINAL ITCHING: ICD-10-CM

## 2019-10-30 PROCEDURE — 99999 PR PBB SHADOW E&M-EST. PATIENT-LVL III: CPT | Mod: PBBFAC,,, | Performed by: ADVANCED PRACTICE MIDWIFE

## 2019-10-30 PROCEDURE — 87481 CANDIDA DNA AMP PROBE: CPT | Mod: 59

## 2019-10-30 PROCEDURE — 99999 PR PBB SHADOW E&M-EST. PATIENT-LVL III: ICD-10-PCS | Mod: PBBFAC,,, | Performed by: ADVANCED PRACTICE MIDWIFE

## 2019-10-30 PROCEDURE — 87801 DETECT AGNT MULT DNA AMPLI: CPT

## 2019-10-30 PROCEDURE — 87491 CHLMYD TRACH DNA AMP PROBE: CPT

## 2019-10-30 PROCEDURE — 99204 OFFICE O/P NEW MOD 45 MIN: CPT | Mod: S$PBB,,, | Performed by: ADVANCED PRACTICE MIDWIFE

## 2019-10-30 PROCEDURE — 99213 OFFICE O/P EST LOW 20 MIN: CPT | Mod: PBBFAC | Performed by: ADVANCED PRACTICE MIDWIFE

## 2019-10-30 PROCEDURE — 99204 PR OFFICE/OUTPT VISIT, NEW, LEVL IV, 45-59 MIN: ICD-10-PCS | Mod: S$PBB,,, | Performed by: ADVANCED PRACTICE MIDWIFE

## 2019-10-30 RX ORDER — BUSPIRONE HYDROCHLORIDE 5 MG/1
5 TABLET ORAL 2 TIMES DAILY
Refills: 1 | Status: ON HOLD | COMMUNITY
Start: 2019-09-27 | End: 2019-12-03

## 2019-10-30 NOTE — PROGRESS NOTES
Subjective:    Patient ID: Tammy Lopez is a 19 y.o. y.o. female.     Chief Complaint:   Chief Complaint   Patient presents with    Vaginal Discharge     white    Vaginal Itching       History of Present Illness:  Tammy CRUZ presents today complaining of vaginal discharge and vaginal itching. Symptoms have been present for 2  weeks and have been gradually worsening. She also denies : abdominal pain, back pain, chills and dysuria.  She does complain of vaginal discharge.  She is sexually active and reports 2 new partners.   She uses no method.        ROS:   Review of Systems   Gastrointestinal: Positive for abdominal pain.   Genitourinary: Positive for vaginal discharge.   Psychiatric/Behavioral: Positive for depression. The patient is nervous/anxious.    All other systems reviewed and are negative.          Objective:    Vital Signs:  Vitals:    10/30/19 1541   BP: 120/78   Pulse: 80   Resp: 17       Physical Exam:  General:  alert, cooperative, no distress   Head Normocephalic, without obvious abnormality, atraumatic   Neck .supple, symmetrical, trachea midline   Skin:  Skin color, texture, turgor normal. No rashes or lesions   Abdomen:  soft, non-tender; bowel sounds normal   Pelvis: External genitalia: normal general appearance  Urinary system: urethral meatus normal, bladder nontender  Vaginal: normal mucosa without prolapse or lesions, discharge, white  Cervix: normal appearance no cmt   Uterus: normal size, shape, position  Adnexa: normal size, nontender bilaterally   Extremities extremities normal, atraumatic, no cyanosis or edema   Neurologic Grossly normal            Assessment:      1. Vaginal discharge    2. Vaginal itching          Plan:      PLAN:   1. Treatment per orders - affirm and gen probe   2. Call or return to clinic prn if these symptoms worsen or fail to improve as anticipated.

## 2019-10-31 LAB
BACTERIAL VAGINOSIS DNA: NEGATIVE
CANDIDA GLABRATA DNA: NEGATIVE
CANDIDA KRUSEI DNA: NEGATIVE
CANDIDA RRNA VAG QL PROBE: POSITIVE
T VAGINALIS RRNA GENITAL QL PROBE: NEGATIVE

## 2019-11-01 LAB
C TRACH DNA SPEC QL NAA+PROBE: NOT DETECTED
N GONORRHOEA DNA SPEC QL NAA+PROBE: NOT DETECTED

## 2019-11-01 RX ORDER — FLUCONAZOLE 100 MG/1
100 TABLET ORAL DAILY
Qty: 1 TABLET | Refills: 0 | Status: SHIPPED | OUTPATIENT
Start: 2019-11-01 | End: 2019-11-02

## 2019-11-18 ENCOUNTER — HOSPITAL ENCOUNTER (EMERGENCY)
Facility: HOSPITAL | Age: 19
Discharge: HOME | End: 2019-11-18
Attending: SURGERY
Payer: MEDICAID

## 2019-11-18 VITALS
HEART RATE: 79 BPM | WEIGHT: 203.94 LBS | SYSTOLIC BLOOD PRESSURE: 123 MMHG | RESPIRATION RATE: 17 BRPM | TEMPERATURE: 98 F | DIASTOLIC BLOOD PRESSURE: 87 MMHG | OXYGEN SATURATION: 100 % | BODY MASS INDEX: 32.91 KG/M2

## 2019-11-18 DIAGNOSIS — Z20.2 POSSIBLE EXPOSURE TO STD: Primary | ICD-10-CM

## 2019-11-18 LAB

## 2019-11-18 PROCEDURE — 25000003 PHARM REV CODE 250: Performed by: SURGERY

## 2019-11-18 PROCEDURE — 63600175 PHARM REV CODE 636 W HCPCS: Performed by: SURGERY

## 2019-11-18 PROCEDURE — 81003 URINALYSIS AUTO W/O SCOPE: CPT

## 2019-11-18 PROCEDURE — 81025 URINE PREGNANCY TEST: CPT

## 2019-11-18 PROCEDURE — 99284 EMERGENCY DEPT VISIT MOD MDM: CPT | Mod: 25

## 2019-11-18 PROCEDURE — 87491 CHLMYD TRACH DNA AMP PROBE: CPT

## 2019-11-18 PROCEDURE — 96372 THER/PROPH/DIAG INJ SC/IM: CPT

## 2019-11-18 RX ORDER — CEFTRIAXONE 250 MG/1
250 INJECTION, POWDER, FOR SOLUTION INTRAMUSCULAR; INTRAVENOUS
Status: COMPLETED | OUTPATIENT
Start: 2019-11-18 | End: 2019-11-18

## 2019-11-18 RX ORDER — AZITHROMYCIN 250 MG/1
1000 TABLET, FILM COATED ORAL
Status: COMPLETED | OUTPATIENT
Start: 2019-11-18 | End: 2019-11-18

## 2019-11-18 RX ADMIN — CEFTRIAXONE SODIUM 250 MG: 250 INJECTION, POWDER, FOR SOLUTION INTRAMUSCULAR; INTRAVENOUS at 04:11

## 2019-11-18 RX ADMIN — AZITHROMYCIN 1000 MG: 250 TABLET, FILM COATED ORAL at 04:11
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
## 2019-11-20 LAB
C TRACH DNA SPEC QL NAA+PROBE: NOT DETECTED
N GONORRHOEA DNA SPEC QL NAA+PROBE: NOT DETECTED

## 2019-12-02 ENCOUNTER — HOSPITAL ENCOUNTER (INPATIENT)
Facility: HOSPITAL | Age: 19
LOS: 6 days | Discharge: HOME | DRG: 885 | End: 2019-12-08
Attending: PSYCHIATRY & NEUROLOGY | Admitting: PSYCHIATRY & NEUROLOGY
Payer: MEDICAID

## 2019-12-02 DIAGNOSIS — F33.2 SEVERE EPISODE OF RECURRENT MAJOR DEPRESSIVE DISORDER, WITHOUT PSYCHOTIC FEATURES: Primary | ICD-10-CM

## 2019-12-02 DIAGNOSIS — E55.9 VITAMIN D DEFICIENCY: ICD-10-CM

## 2019-12-02 DIAGNOSIS — N89.8 VAGINAL DISCHARGE: ICD-10-CM

## 2019-12-02 DIAGNOSIS — F32.A DEPRESSION WITH SUICIDAL IDEATION: ICD-10-CM

## 2019-12-02 DIAGNOSIS — D64.9 ANEMIA, UNSPECIFIED TYPE: ICD-10-CM

## 2019-12-02 DIAGNOSIS — R45.851 DEPRESSION WITH SUICIDAL IDEATION: ICD-10-CM

## 2019-12-02 DIAGNOSIS — D50.8 IRON DEFICIENCY ANEMIA SECONDARY TO INADEQUATE DIETARY IRON INTAKE: ICD-10-CM

## 2019-12-02 PROCEDURE — 82728 ASSAY OF FERRITIN: CPT

## 2019-12-02 PROCEDURE — 25000003 PHARM REV CODE 250: Performed by: PSYCHIATRY & NEUROLOGY

## 2019-12-02 PROCEDURE — 83540 ASSAY OF IRON: CPT

## 2019-12-02 PROCEDURE — 11400000 HC PSYCH PRIVATE ROOM

## 2019-12-02 PROCEDURE — 83036 HEMOGLOBIN GLYCOSYLATED A1C: CPT

## 2019-12-02 RX ORDER — OLANZAPINE 10 MG/1
10 TABLET ORAL EVERY 4 HOURS PRN
Status: DISCONTINUED | OUTPATIENT
Start: 2019-12-02 | End: 2019-12-08 | Stop reason: HOSPADM

## 2019-12-02 RX ORDER — MAG HYDROX/ALUMINUM HYD/SIMETH 200-200-20
30 SUSPENSION, ORAL (FINAL DOSE FORM) ORAL EVERY 6 HOURS PRN
Status: DISCONTINUED | OUTPATIENT
Start: 2019-12-02 | End: 2019-12-08 | Stop reason: HOSPADM

## 2019-12-02 RX ORDER — FOLIC ACID 1 MG/1
1 TABLET ORAL DAILY
Status: DISCONTINUED | OUTPATIENT
Start: 2019-12-03 | End: 2019-12-08 | Stop reason: HOSPADM

## 2019-12-02 RX ORDER — LOPERAMIDE HYDROCHLORIDE 2 MG/1
2 CAPSULE ORAL
Status: DISCONTINUED | OUTPATIENT
Start: 2019-12-02 | End: 2019-12-08 | Stop reason: HOSPADM

## 2019-12-02 RX ORDER — ACETAMINOPHEN 325 MG/1
650 TABLET ORAL EVERY 6 HOURS PRN
Status: DISCONTINUED | OUTPATIENT
Start: 2019-12-02 | End: 2019-12-08 | Stop reason: HOSPADM

## 2019-12-02 RX ORDER — HYDROXYZINE PAMOATE 50 MG/1
50 CAPSULE ORAL EVERY 6 HOURS PRN
Status: DISCONTINUED | OUTPATIENT
Start: 2019-12-02 | End: 2019-12-08 | Stop reason: HOSPADM

## 2019-12-02 RX ORDER — OLANZAPINE 10 MG/2ML
10 INJECTION, POWDER, FOR SOLUTION INTRAMUSCULAR EVERY 4 HOURS PRN
Status: DISCONTINUED | OUTPATIENT
Start: 2019-12-02 | End: 2019-12-08 | Stop reason: HOSPADM

## 2019-12-02 RX ORDER — DOCUSATE SODIUM 100 MG/1
100 CAPSULE, LIQUID FILLED ORAL DAILY PRN
Status: DISCONTINUED | OUTPATIENT
Start: 2019-12-02 | End: 2019-12-08 | Stop reason: HOSPADM

## 2019-12-02 RX ADMIN — HYDROXYZINE PAMOATE 50 MG: 50 CAPSULE ORAL at 10:12

## 2019-12-03 PROBLEM — E55.9 VITAMIN D DEFICIENCY: Status: ACTIVE | Noted: 2019-12-03

## 2019-12-03 LAB
ESTIMATED AVG GLUCOSE: 105 MG/DL (ref 68–131)
FERRITIN SERPL-MCNC: <1 NG/ML (ref 20–300)
HBA1C MFR BLD HPLC: 5.3 % (ref 4–5.6)
IRON SERPL-MCNC: 13 UG/DL (ref 30–160)
SATURATED IRON: 3 % (ref 20–50)
TOTAL IRON BINDING CAPACITY: 509 UG/DL (ref 250–450)
TRANSFERRIN SERPL-MCNC: 344 MG/DL (ref 200–375)

## 2019-12-03 PROCEDURE — 25000003 PHARM REV CODE 250: Performed by: PSYCHIATRY & NEUROLOGY

## 2019-12-03 PROCEDURE — 99223 PR INITIAL HOSPITAL CARE,LEVL III: ICD-10-PCS | Mod: ,,, | Performed by: PSYCHIATRY & NEUROLOGY

## 2019-12-03 PROCEDURE — 90833 PSYTX W PT W E/M 30 MIN: CPT | Mod: ,,, | Performed by: PSYCHIATRY & NEUROLOGY

## 2019-12-03 PROCEDURE — 99223 1ST HOSP IP/OBS HIGH 75: CPT | Mod: ,,, | Performed by: PSYCHIATRY & NEUROLOGY

## 2019-12-03 PROCEDURE — 11400000 HC PSYCH PRIVATE ROOM

## 2019-12-03 PROCEDURE — 90833 PR PSYCHOTHERAPY W/PATIENT W/E&M, 30 MIN (ADD ON): ICD-10-PCS | Mod: ,,, | Performed by: PSYCHIATRY & NEUROLOGY

## 2019-12-03 PROCEDURE — 36415 COLL VENOUS BLD VENIPUNCTURE: CPT

## 2019-12-03 RX ORDER — QUETIAPINE FUMARATE 25 MG/1
25 TABLET, FILM COATED ORAL NIGHTLY
Status: DISCONTINUED | OUTPATIENT
Start: 2019-12-03 | End: 2019-12-04

## 2019-12-03 RX ORDER — ESCITALOPRAM OXALATE 5 MG/1
5 TABLET ORAL DAILY
Status: DISCONTINUED | OUTPATIENT
Start: 2019-12-03 | End: 2019-12-04

## 2019-12-03 RX ORDER — FERROUS SULFATE 325(65) MG
325 TABLET ORAL 2 TIMES DAILY
Status: DISCONTINUED | OUTPATIENT
Start: 2019-12-03 | End: 2019-12-08 | Stop reason: HOSPADM

## 2019-12-03 RX ADMIN — FOLIC ACID 1 MG: 1 TABLET ORAL at 08:12

## 2019-12-03 RX ADMIN — THERA TABS 1 TABLET: TAB at 08:12

## 2019-12-03 RX ADMIN — ESCITALOPRAM OXALATE 5 MG: 5 TABLET, FILM COATED ORAL at 09:12

## 2019-12-03 RX ADMIN — FERROUS SULFATE TAB 325 MG (65 MG ELEMENTAL FE) 325 MG: 325 (65 FE) TAB at 12:12

## 2019-12-03 RX ADMIN — FERROUS SULFATE TAB 325 MG (65 MG ELEMENTAL FE) 325 MG: 325 (65 FE) TAB at 08:12

## 2019-12-03 RX ADMIN — QUETIAPINE FUMARATE 25 MG: 25 TABLET ORAL at 08:12

## 2019-12-03 NOTE — PLAN OF CARE
Shift note : patient is eating all meals and is taking all ordered medications and attending all groups to improve her coping skills . She is very quiet .

## 2019-12-03 NOTE — PLAN OF CARE
"Pt presented to our ER stated " I wanted to kill myself last night, I was gonna take some pills" , pt depressed and SI planned to OD on pills, pt has long standing issues with anxiety and depression from early childhood event, pt PEC'd, pt accepted by Dr. Sarath Car, pt up to unit via wheelchair with security and MHT, property and body search performed by Melania Armas RN no paraphernalia found, pt rash to L elbow and R knee, pt withdrawn, shy, speaks softly, restricted, guarded, pt DENIES SI at this time, pt contracted for safety, pt had suicide attempt in 2017 by OD, when asked "how can we we help you? Pt stated "I need somebody to talk to", oriented pt to unit, safety precautions maintained will continue to monitor    "

## 2019-12-03 NOTE — ASSESSMENT & PLAN NOTE
check Ferritin and iron panel     Lab Results   Component Value Date    IRON 13 (L) 12/02/2019    TIBC 509 (H) 12/02/2019    FERRITIN <1 (L) 12/02/2019     Severely low- start oral iron, consider IV ; discussed with Dr. Perez - will order iron rx  Venofer 100mg IV once today x 1 dose  F/U with PCP; consider repeat in 1 week

## 2019-12-03 NOTE — PLAN OF CARE
Problem: Adult Behavioral Health Plan of Care  Goal: Optimized Coping Skills in Response to Life Stressors  Intervention: Promote Effective Coping Strategies  Flowsheets (Taken 12/3/2019 8253)  Supportive Measures: active listening utilized; positive reinforcement provided; verbalization of feelings encouraged; relaxation techniques promoted; self-care encouraged; self-reflection promoted; self-responsibility promoted; journaling promoted     Behavior: Patient did not attend psychotherapeutic group despite encouragement.    Intervention:  will engage patients in a CBT based group focused on countering negative thoughts.  will use strengths perspective and supportive therapy. Patients will be given time to express thoughts, concerns and goals for treatment and discharge, as well as perceived barriers to progress.    Response: Patient remained asleep in her room and isolative. Pt says that she is sleepy today.     Plan:  will continue to encourage patient to explore and verbalize emotions, set goals to aid in preventing re-hospitalization, attend psychotherapeutic group, and follow up with aftercare appointments.

## 2019-12-03 NOTE — PROGRESS NOTES
"   12/03/19 1040   [facility] Group Therapy   Group Name Leisure Skills Training   Specific Interventions Cognitive Stimulation Training   Participation Level Appropriate   Participation Quality Cooperative   Insight/Motivation Applies New Skills;Improved   Affect/Mood Display Appropriate   Cognition Alert   Psychomotor WNL   Patient had to be encouraged to come out of her room due to "I'm sleepy." Patient states she usually sleep all day and is up during the night playing on her phone. Patient reports she had trouble concentrating during the activity due to "I had other stuff on my mind. I'm just ready to go home."  "

## 2019-12-03 NOTE — SUBJECTIVE & OBJECTIVE
Past Medical History:   Diagnosis Date    ADHD (attention deficit hyperactivity disorder)     Depression     History of psychiatric hospitalization     Hx of psychiatric care     Psychiatric problem     Suicide attempt     Therapy        No past surgical history on file.    Review of patient's allergies indicates:  No Known Allergies    Current Facility-Administered Medications on File Prior to Encounter   Medication    [DISCONTINUED] diphenhydrAMINE injection 50 mg    [DISCONTINUED] haloperidol lactate injection 5 mg    [DISCONTINUED] lorazepam injection 2 mg    [DISCONTINUED] nicotine 21 mg/24 hr 1 patch     Current Outpatient Medications on File Prior to Encounter   Medication Sig    escitalopram oxalate (LEXAPRO) 20 MG tablet Take 1 tablet (20 mg total) by mouth once daily.    ferrous sulfate 325 (65 FE) MG EC tablet Take 1 tablet (325 mg total) by mouth 2 (two) times daily.    [DISCONTINUED] busPIRone (BUSPAR) 5 MG Tab Take 5 mg by mouth 2 (two) times daily.    [DISCONTINUED] ibuprofen (ADVIL,MOTRIN) 800 MG tablet Take 1 tablet (800 mg total) by mouth every 6 (six) hours as needed for Pain. (Patient not taking: Reported on 10/30/2019)    [DISCONTINUED] OXcarbazepine (TRILEPTAL) 300 MG Tab Take 1 tablet (300 mg total) by mouth 2 (two) times daily.     Family History     None        Tobacco Use    Smoking status: Former Smoker     Packs/day: 0.00     Types: Vaping with nicotine    Smokeless tobacco: Never Used   Substance and Sexual Activity    Alcohol use: Never     Frequency: Never     Comment: occasionally    Drug use: Not Currently     Types: Marijuana    Sexual activity: Yes     Partners: Male     Birth control/protection: None     Comment: single     Review of Systems   Constitutional: Negative for chills and fever.   HENT: Negative for congestion and sore throat.    Respiratory: Negative for cough, chest tightness and shortness of breath.    Cardiovascular: Negative for chest pain,  palpitations and leg swelling.   Gastrointestinal: Negative for abdominal pain, diarrhea, nausea and vomiting.   Genitourinary: Negative for flank pain, frequency and hematuria.   Musculoskeletal: Negative for back pain and neck pain.   Skin: Negative for rash and wound.   Neurological: Negative for dizziness, seizures, syncope and headaches.   Psychiatric/Behavioral: Negative for agitation, confusion and self-injury.     Objective:     Vital Signs (Most Recent):  Temp: 98.2 °F (36.8 °C) (12/03/19 1421)  Pulse: 87 (12/03/19 1421)  Resp: 18 (12/03/19 1421)  BP: 131/69 (12/03/19 1421) Vital Signs (24h Range):  Temp:  [98 °F (36.7 °C)-98.7 °F (37.1 °C)] 98.2 °F (36.8 °C)  Pulse:  [76-87] 87  Resp:  [18] 18  SpO2:  [99 %] 99 %  BP: (119-131)/(63-81) 131/69     Weight: 90.5 kg (199 lb 8.3 oz)  Body mass index is 32.2 kg/m².    Physical Exam   Constitutional: She is oriented to person, place, and time. She appears well-developed and well-nourished. No distress.   HENT:   Head: Normocephalic and atraumatic.   Eyes: Pupils are equal, round, and reactive to light.   Neck: No thyromegaly present.   Cardiovascular: Normal rate, regular rhythm, normal heart sounds and intact distal pulses.   No murmur heard.  Pulmonary/Chest: Effort normal and breath sounds normal. No respiratory distress. She has no wheezes. She has no rales.   Abdominal: Soft. Bowel sounds are normal. She exhibits no distension and no mass. There is no tenderness.   Musculoskeletal: Normal range of motion. She exhibits no edema or deformity.   Lymphadenopathy:     She has no cervical adenopathy.   Neurological: She is alert and oriented to person, place, and time.   CN II visual fields full to confrontation  CN III, Iv, VI- pupils ERRL   CN III- no palsy  Nystagmus; none   Diplopia- none  ophthalmoparesis- none  CN V- facial sensation intact  CN VII- facial expression full and symmetric  CNVIII- normal  CN IV-Normal  CN X- normal  CN XI- Normal   CN XII  normal      Skin: Skin is warm and dry. No rash noted. No erythema.   Nursing note and vitals reviewed.       Significant Labs:   UPT  Results for orders placed or performed during the hospital encounter of 01/30/16   POCT urine pregnancy   Result Value Ref Range    POC Preg Test, Ur Negative Negative     Acceptable Yes      U/A  Results for orders placed or performed during the hospital encounter of 12/02/19   Urinalysis   Result Value Ref Range    Specimen UA Urine, Clean Catch     Color, UA Yellow Yellow, Straw, Steffanie    Appearance, UA Clear Clear    pH, UA 6.0 5.0 - 8.0    Specific Gravity, UA >=1.030 (A) 1.005 - 1.030    Protein, UA Negative Negative    Glucose, UA Negative Negative    Ketones, UA Negative Negative    Bilirubin (UA) Negative Negative    Occult Blood UA Negative Negative    Nitrite, UA Negative Negative    Urobilinogen, UA Negative <2.0 EU/dL    Leukocytes, UA Negative Negative     UDS  Results for orders placed or performed during the hospital encounter of 12/02/19   Drug screen panel, emergency   Result Value Ref Range    Benzodiazepines Negative     Methadone metabolites Negative     Cocaine (Metab.) Negative     Opiate Scrn, Ur Negative     Barbiturate Screen, Ur Negative     Amphetamine Screen, Ur Negative     THC Negative     Phencyclidine Negative     Creatinine, Random Ur 279.7 15.0 - 325.0 mg/dL    Toxicology Information SEE COMMENT      CBC  Results for orders placed or performed during the hospital encounter of 12/02/19   CBC auto differential   Result Value Ref Range    WBC 3.11 (L) 3.90 - 12.70 K/uL    RBC 3.93 (L) 4.00 - 5.40 M/uL    Hemoglobin 10.0 (L) 12.0 - 16.0 g/dL    Hematocrit 32.6 (L) 37.0 - 48.5 %    Mean Corpuscular Volume 83 82 - 98 fL    Mean Corpuscular Hemoglobin 25.4 (L) 27.0 - 31.0 pg    Mean Corpuscular Hemoglobin Conc 30.7 (L) 32.0 - 36.0 g/dL    RDW 14.6 (H) 11.5 - 14.5 %    Platelets 308 150 - 350 K/uL    MPV 10.2 9.2 - 12.9 fL    Immature  Granulocytes 0.0 0.0 - 0.5 %    Gran # (ANC) 1.3 (L) 1.8 - 7.7 K/uL    Immature Grans (Abs) 0.00 0.00 - 0.04 K/uL    Lymph # 1.2 1.0 - 4.8 K/uL    Mono # 0.4 0.3 - 1.0 K/uL    Eos # 0.2 0.0 - 0.5 K/uL    Baso # 0.02 0.00 - 0.20 K/uL    nRBC 0 0 /100 WBC    Gran% 40.9 38.0 - 73.0 %    Lymph% 37.6 18.0 - 48.0 %    Mono% 14.1 4.0 - 15.0 %    Eosinophil% 6.8 0.0 - 8.0 %    Basophil% 0.6 0.0 - 1.9 %    Differential Method Automated      CMP  Results for orders placed or performed during the hospital encounter of 12/02/19   Comprehensive metabolic panel   Result Value Ref Range    Sodium 139 136 - 145 mmol/L    Potassium 3.6 3.5 - 5.1 mmol/L    Chloride 110 95 - 110 mmol/L    CO2 23 23 - 29 mmol/L    Glucose 92 70 - 110 mg/dL    BUN, Bld 8 6 - 20 mg/dL    Creatinine 0.7 0.5 - 1.4 mg/dL    Calcium 9.0 8.7 - 10.5 mg/dL    Total Protein 7.3 6.0 - 8.4 g/dL    Albumin 3.5 3.5 - 5.2 g/dL    Total Bilirubin 0.3 0.1 - 1.0 mg/dL    Alkaline Phosphatase 72 55 - 135 U/L    AST 13 10 - 40 U/L    ALT 11 10 - 44 U/L    Anion Gap 6 (L) 8 - 16 mmol/L    eGFR if African American >60 >60 mL/min/1.73 m^2    eGFR if non African American >60 >60 mL/min/1.73 m^2     TSH  Results for orders placed or performed during the hospital encounter of 12/02/19   TSH   Result Value Ref Range    TSH 0.278 (L) 0.400 - 4.000 uIU/mL     ETOH  Results for orders placed or performed during the hospital encounter of 12/02/19   Ethanol   Result Value Ref Range    Alcohol, Medical, Serum <10 <10 mg/dL     Salicylate  Results for orders placed or performed during the hospital encounter of 12/02/19   Salicylate level   Result Value Ref Range    Salicylate Lvl <5.0 (L) 15.0 - 30.0 mg/dL     Acetaminophen  Results for orders placed or performed during the hospital encounter of 12/02/19   Acetaminophen level   Result Value Ref Range    Acetaminophen (Tylenol), Serum <3.0 (L) 10.0 - 20.0 ug/mL     Lab Results   Component Value Date    IRON 13 (L) 12/02/2019    TIBC 509  (H) 12/02/2019    FERRITIN <1 (L) 12/02/2019             Significant Imaging: none

## 2019-12-03 NOTE — PLAN OF CARE
Recommendation:   1. Rec Boost BID with meals to increase calorie intake   2. Encourage pt. to increase meal intake  Goals: Pt. to consume at least 75% of meals by next f/u   Nutrition Goal Status: new  Nutrition Discharge Planning: Regular diet
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
## 2019-12-03 NOTE — PROGRESS NOTES
"[facility]  Adult Nutrition  Progress Note    SUMMARY       Recommendations    Recommendation:   1. Rec Boost BID with meals to increase calorie intake   2. Encourage pt. to increase meal intake  Goals: Pt. to consume at least 75% of meals by next f/u   Nutrition Goal Status: new  Communication of RD Recs: other (comment)(POC)    Reason for Assessment    Reason For Assessment: consult  Diagnosis: psychological disorder  Relevant Medical History: Anemia, Depression, Axiety   General Information Comments: At this time pt. has 50% of meal intake with 0% snack intake. No significant weight gain or loss per chart notes. NFPE not conducted per psych status.     Nutrition Risk Screen    Nutrition Risk Screen: no indicators present    Nutrition/Diet History    Spiritual, Cultural Beliefs, Nondenominational Practices, Values that Affect Care: no  Food Allergies: NKFA    Anthropometrics    Temp: 98 °F (36.7 °C)  Height Method: Stated  Height: 5' 6" (167.6 cm)  Height (inches): 66 in  Weight Method: Standard Scale  Weight: 90.5 kg (199 lb 8.3 oz)  Weight (lb): 199.52 lb  Ideal Body Weight (IBW), Female: 130 lb  % Ideal Body Weight, Female (lb): 153.48 lb  BMI (Calculated): 32.2  BMI Grade: 30 - 34.9- obesity - grade I       Lab/Procedures/Meds    Pertinent Labs Reviewed: reviewed  Pertinent Labs Comments: Hgb 10 (L); Hct 32.6 (L); Vit D 10 (L)  Pertinent Medications Reviewed: reviewed  Pertinent Medications Comments: folic acid, multivitamin     Estimated/Assessed Needs    Weight Used For Calorie Calculations: 90.5 kg (199 lb 8.3 oz)  Energy Calorie Requirements (kcal): 1700    Energy Need Method: C[place]ay   Protein Requirements: 72g(0.8g/kg BW)  Weight Used For Protein Calculations: 90.5 kg (199 lb 8.3 oz)  Fluid Requirements (mL): 1700  Estimated Fluid Requirement Method: RDA Method  RDA Method (mL): 1700     Nutrition Prescription Ordered    Current Diet Order: Regular diet     Evaluation of Received " Nutrient/Fluid Intake    Energy Calories Required: not meeting needs  Protein Required: not meeting needs  Fluid Required: meeting needs  % Intake of Estimated Energy Needs: 0 - 25 %  % Meal Intake: 25 - 50 %    Nutrition Risk    Level of Risk/Frequency of Follow-up: (1x wk )     Assessment and Plan  Nutrition Problem  Inadequate energy intake     Related to (etiology):   Psychosis     Signs and Symptoms (as evidenced by):   Pt. meeting <25% of EEN via meal intake of 25-50%     Interventions:  Commercial beverage- Boost BID     Nutrition Diagnosis Status:   New      Monitor and Evaluation    Food and Nutrient Intake: food and beverage intake, energy intake  Food and Nutrient Adminstration: diet order  Anthropometric Measurements: weight, weight change      Nutrition Follow-Up    RD Follow-up?: Yes
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
## 2019-12-03 NOTE — PLAN OF CARE
Lying quiet in bed, eyes closed, respiration even and unlabored, appearing asleep.  Slept well most all shift except awake lying in bed without complaint from [id number].  Safety and precautions maintained with rounds every 15 minutes, bed is fixed in a low position and pathways kept clear.  No fall occurred.

## 2019-12-03 NOTE — HPI
"Tammy Lopez is a 19 y.o. female with a past psychiatric history of "depression, anxiety and Bipolar," currently admitted to the inpatient unit with the following chief complaint: suicidal thoughts, depression  Lab Results   Component Value Date    IRON 13 (L) 12/02/2019    TIBC 509 (H) 12/02/2019    FERRITIN <1 (L) 12/02/2019     Pt denies heavy menstrual cycle or irregularity; she notes she has history of iron deficiency but does not take iron due to GI side effect. She reports chronic fatigue. Notes mother has iron deficiency also but is poor historian.     "

## 2019-12-04 PROCEDURE — 99233 PR SUBSEQUENT HOSPITAL CARE,LEVL III: ICD-10-PCS | Mod: ,,, | Performed by: PSYCHIATRY & NEUROLOGY

## 2019-12-04 PROCEDURE — 99233 SBSQ HOSP IP/OBS HIGH 50: CPT | Mod: ,,, | Performed by: PSYCHIATRY & NEUROLOGY

## 2019-12-04 PROCEDURE — 11400000 HC PSYCH PRIVATE ROOM

## 2019-12-04 PROCEDURE — 90833 PSYTX W PT W E/M 30 MIN: CPT | Mod: ,,, | Performed by: PSYCHIATRY & NEUROLOGY

## 2019-12-04 PROCEDURE — 90833 PR PSYCHOTHERAPY W/PATIENT W/E&M, 30 MIN (ADD ON): ICD-10-PCS | Mod: ,,, | Performed by: PSYCHIATRY & NEUROLOGY

## 2019-12-04 PROCEDURE — 25000003 PHARM REV CODE 250: Performed by: PSYCHIATRY & NEUROLOGY

## 2019-12-04 RX ORDER — ESCITALOPRAM OXALATE 10 MG/1
10 TABLET ORAL DAILY
Status: DISCONTINUED | OUTPATIENT
Start: 2019-12-05 | End: 2019-12-08 | Stop reason: HOSPADM

## 2019-12-04 RX ORDER — QUETIAPINE FUMARATE 25 MG/1
50 TABLET, FILM COATED ORAL NIGHTLY
Status: DISCONTINUED | OUTPATIENT
Start: 2019-12-04 | End: 2019-12-08 | Stop reason: HOSPADM

## 2019-12-04 RX ADMIN — THERA TABS 1 TABLET: TAB at 08:12

## 2019-12-04 RX ADMIN — FERROUS SULFATE TAB 325 MG (65 MG ELEMENTAL FE) 325 MG: 325 (65 FE) TAB at 08:12

## 2019-12-04 RX ADMIN — QUETIAPINE FUMARATE 50 MG: 25 TABLET ORAL at 08:12

## 2019-12-04 RX ADMIN — FOLIC ACID 1 MG: 1 TABLET ORAL at 08:12

## 2019-12-04 RX ADMIN — ESCITALOPRAM OXALATE 5 MG: 5 TABLET, FILM COATED ORAL at 08:12

## 2019-12-04 NOTE — PROGRESS NOTES
"   12/03/19 1532   Assessment   Patient's Identification of the Problem Patient presents with a calm, cooperative affect and "tired, sleepy" mood. Patient states her admit is due to "depression, suicidal thoughts." Patient reports she had thoughts of overdosing with pills. Patient reports family stressors, recent break-up with boyfriend of one month a week ago. Patient reports "smoking weed" and not remembering everything that happened. Patient reports she has been off her medications for 2 months. Patient admits to smoking marijuana occasionally(last use 2 weeks ago), Patient reports she is single, no children, 10th grade education(some special education classes), unemployed, lives with her mother in [place]. Patient verbalized main goal is to get back on her medications.   Leisure Interest Listen to Music;Sleep;Computer;Classical/Table Games   Leisure Barriers Lack of Transportation;Loss of Interest;Cognitive Skill Level;Lack of Finances;Energy Level;Self Confidence;Fears/Phobias;Other (See Comments)  (fear going to the store by myself)   Treatment Focus Increase Self Confidence;To Improve Leisure Awareness/Lifestyle/Interest;To Promote Successful and Safe Self Expression;To Improve Coping Skills;To Increase Energy Level     Treatment Recommendation: To address problem(s) #  1:1 Intervention (as needed)    Cognitive Stimulation Skilled Activity  Creative Expression Skilled Activity  Mild Exercises Skilled Activity  Stress Management Skilled Activity  Coping Skilled Activity  Leisure Education and Awareness Skilled Activity    Treatment Goal(s):  Long Term Goals Refer To Master Treatment Plan    Short Term Treatment Goal(s)  Patient Will:  Exhibit Improvement in Mood  Demonstrate Constructive Expression of Feelings and Behavior  Identify at Least 2 Coping Skills or Leisure Skills to Reduce Depression and Hopelessness Upon Request from Therapist    Discharge Recommendations:  Encourage Patient to Actively Utilize " Available Community Resources to Increase Leisure Involvement to Decrease Signs and Symptoms of Illness  Encourage Patient to Utilize Coping Skills on a Regular Basis to Reduce the Risk of Decompensating and Re-Hospitalizations  Follow Up with After Care Appointments  Continue with Current Leisure Activities

## 2019-12-04 NOTE — CONSULTS
"[facility] Medicine  Consult Note    Patient Name: Tammy Lopez  MRN: [id number]  Admission Date: 12/2/2019  Hospital Length of Stay: 2 days  Attending Physician: Sarath Car MD   Primary Care Provider: Sherice Ross NP           Patient information was obtained from patient and ER records.     Inpatient consult to [facility] for History and Physical  Consult performed by: Haylee Sheriff NP  Consult ordered by: Sarath Car MD        Subjective:     Principal Problem: Severe episode of recurrent major depressive disorder, without psychotic features    Chief Complaint: No chief complaint on file.       HPI:   Tammy Lopez is a 19 y.o. female with a past psychiatric history of "depression, anxiety and Bipolar," currently admitted to the inpatient unit with the following chief complaint: suicidal thoughts, depression  Lab Results   Component Value Date    IRON 13 (L) 12/02/2019    TIBC 509 (H) 12/02/2019    FERRITIN <1 (L) 12/02/2019     Pt denies heavy menstrual cycle or irregularity; she notes she has history of iron deficiency but does not take iron due to GI side effect. She reports chronic fatigue. Notes mother has iron deficiency also but is poor historian.       Past Medical History:   Diagnosis Date    ADHD (attention deficit hyperactivity disorder)     Depression     History of psychiatric hospitalization     Hx of psychiatric care     Psychiatric problem     Suicide attempt     Therapy        No past surgical history on file.    Review of patient's allergies indicates:  No Known Allergies    Current Facility-Administered Medications on File Prior to Encounter   Medication    [DISCONTINUED] diphenhydrAMINE injection 50 mg    [DISCONTINUED] haloperidol lactate injection 5 mg    [DISCONTINUED] lorazepam injection 2 mg    [DISCONTINUED] nicotine 21 mg/24 hr 1 patch     Current Outpatient Medications on File Prior to Encounter "   Medication Sig    escitalopram oxalate (LEXAPRO) 20 MG tablet Take 1 tablet (20 mg total) by mouth once daily.    ferrous sulfate 325 (65 FE) MG EC tablet Take 1 tablet (325 mg total) by mouth 2 (two) times daily.    [DISCONTINUED] busPIRone (BUSPAR) 5 MG Tab Take 5 mg by mouth 2 (two) times daily.    [DISCONTINUED] ibuprofen (ADVIL,MOTRIN) 800 MG tablet Take 1 tablet (800 mg total) by mouth every 6 (six) hours as needed for Pain. (Patient not taking: Reported on 10/30/2019)    [DISCONTINUED] OXcarbazepine (TRILEPTAL) 300 MG Tab Take 1 tablet (300 mg total) by mouth 2 (two) times daily.     Family History     None        Tobacco Use    Smoking status: Former Smoker     Packs/day: 0.00     Types: Vaping with nicotine    Smokeless tobacco: Never Used   Substance and Sexual Activity    Alcohol use: Never     Frequency: Never     Comment: occasionally    Drug use: Not Currently     Types: Marijuana    Sexual activity: Yes     Partners: Male     Birth control/protection: None     Comment: single     Review of Systems   Constitutional: Negative for chills and fever.   HENT: Negative for congestion and sore throat.    Respiratory: Negative for cough, chest tightness and shortness of breath.    Cardiovascular: Negative for chest pain, palpitations and leg swelling.   Gastrointestinal: Negative for abdominal pain, diarrhea, nausea and vomiting.   Genitourinary: Negative for flank pain, frequency and hematuria.   Musculoskeletal: Negative for back pain and neck pain.   Skin: Negative for rash and wound.   Neurological: Negative for dizziness, seizures, syncope and headaches.   Psychiatric/Behavioral: Negative for agitation, confusion and self-injury.     Objective:     Vital Signs (Most Recent):  Temp: 98.2 °F (36.8 °C) (12/03/19 1421)  Pulse: 87 (12/03/19 1421)  Resp: 18 (12/03/19 1421)  BP: 131/69 (12/03/19 1421) Vital Signs (24h Range):  Temp:  [98 °F (36.7 °C)-98.7 °F (37.1 °C)] 98.2 °F (36.8 °C)  Pulse:   [76-87] 87  Resp:  [18] 18  SpO2:  [99 %] 99 %  BP: (119-131)/(63-81) 131/69     Weight: 90.5 kg (199 lb 8.3 oz)  Body mass index is 32.2 kg/m².    Physical Exam   Constitutional: She is oriented to person, place, and time. She appears well-developed and well-nourished. No distress.   HENT:   Head: Normocephalic and atraumatic.   Eyes: Pupils are equal, round, and reactive to light.   Neck: No thyromegaly present.   Cardiovascular: Normal rate, regular rhythm, normal heart sounds and intact distal pulses.   No murmur heard.  Pulmonary/Chest: Effort normal and breath sounds normal. No respiratory distress. She has no wheezes. She has no rales.   Abdominal: Soft. Bowel sounds are normal. She exhibits no distension and no mass. There is no tenderness.   Musculoskeletal: Normal range of motion. She exhibits no edema or deformity.   Lymphadenopathy:     She has no cervical adenopathy.   Neurological: She is alert and oriented to person, place, and time.   CN II visual fields full to confrontation  CN III, Iv, VI- pupils ERRL   CN III- no palsy  Nystagmus; none   Diplopia- none  ophthalmoparesis- none  CN V- facial sensation intact  CN VII- facial expression full and symmetric  CNVIII- normal  CN IV-Normal  CN X- normal  CN XI- Normal   CN XII normal      Skin: Skin is warm and dry. No rash noted. No erythema.   Nursing note and vitals reviewed.       Significant Labs:   UPT  Results for orders placed or performed during the hospital encounter of 01/30/16   POCT urine pregnancy   Result Value Ref Range    POC Preg Test, Ur Negative Negative     Acceptable Yes      U/A  Results for orders placed or performed during the hospital encounter of 12/02/19   Urinalysis   Result Value Ref Range    Specimen UA Urine, Clean Catch     Color, UA Yellow Yellow, Straw, Steffanie    Appearance, UA Clear Clear    pH, UA 6.0 5.0 - 8.0    Specific Gravity, UA >=1.030 (A) 1.005 - 1.030    Protein, UA Negative Negative    Glucose,  UA Negative Negative    Ketones, UA Negative Negative    Bilirubin (UA) Negative Negative    Occult Blood UA Negative Negative    Nitrite, UA Negative Negative    Urobilinogen, UA Negative <2.0 EU/dL    Leukocytes, UA Negative Negative     UDS  Results for orders placed or performed during the hospital encounter of 12/02/19   Drug screen panel, emergency   Result Value Ref Range    Benzodiazepines Negative     Methadone metabolites Negative     Cocaine (Metab.) Negative     Opiate Scrn, Ur Negative     Barbiturate Screen, Ur Negative     Amphetamine Screen, Ur Negative     THC Negative     Phencyclidine Negative     Creatinine, Random Ur 279.7 15.0 - 325.0 mg/dL    Toxicology Information SEE COMMENT      CBC  Results for orders placed or performed during the hospital encounter of 12/02/19   CBC auto differential   Result Value Ref Range    WBC 3.11 (L) 3.90 - 12.70 K/uL    RBC 3.93 (L) 4.00 - 5.40 M/uL    Hemoglobin 10.0 (L) 12.0 - 16.0 g/dL    Hematocrit 32.6 (L) 37.0 - 48.5 %    Mean Corpuscular Volume 83 82 - 98 fL    Mean Corpuscular Hemoglobin 25.4 (L) 27.0 - 31.0 pg    Mean Corpuscular Hemoglobin Conc 30.7 (L) 32.0 - 36.0 g/dL    RDW 14.6 (H) 11.5 - 14.5 %    Platelets 308 150 - 350 K/uL    MPV 10.2 9.2 - 12.9 fL    Immature Granulocytes 0.0 0.0 - 0.5 %    Gran # (ANC) 1.3 (L) 1.8 - 7.7 K/uL    Immature Grans (Abs) 0.00 0.00 - 0.04 K/uL    Lymph # 1.2 1.0 - 4.8 K/uL    Mono # 0.4 0.3 - 1.0 K/uL    Eos # 0.2 0.0 - 0.5 K/uL    Baso # 0.02 0.00 - 0.20 K/uL    nRBC 0 0 /100 WBC    Gran% 40.9 38.0 - 73.0 %    Lymph% 37.6 18.0 - 48.0 %    Mono% 14.1 4.0 - 15.0 %    Eosinophil% 6.8 0.0 - 8.0 %    Basophil% 0.6 0.0 - 1.9 %    Differential Method Automated      CMP  Results for orders placed or performed during the hospital encounter of 12/02/19   Comprehensive metabolic panel   Result Value Ref Range    Sodium 139 136 - 145 mmol/L    Potassium 3.6 3.5 - 5.1 mmol/L    Chloride 110 95 - 110 mmol/L    CO2 23 23 - 29  mmol/L    Glucose 92 70 - 110 mg/dL    BUN, Bld 8 6 - 20 mg/dL    Creatinine 0.7 0.5 - 1.4 mg/dL    Calcium 9.0 8.7 - 10.5 mg/dL    Total Protein 7.3 6.0 - 8.4 g/dL    Albumin 3.5 3.5 - 5.2 g/dL    Total Bilirubin 0.3 0.1 - 1.0 mg/dL    Alkaline Phosphatase 72 55 - 135 U/L    AST 13 10 - 40 U/L    ALT 11 10 - 44 U/L    Anion Gap 6 (L) 8 - 16 mmol/L    eGFR if African American >60 >60 mL/min/1.73 m^2    eGFR if non African American >60 >60 mL/min/1.73 m^2     TSH  Results for orders placed or performed during the hospital encounter of 12/02/19   TSH   Result Value Ref Range    TSH 0.278 (L) 0.400 - 4.000 uIU/mL     ETOH  Results for orders placed or performed during the hospital encounter of 12/02/19   Ethanol   Result Value Ref Range    Alcohol, Medical, Serum <10 <10 mg/dL     Salicylate  Results for orders placed or performed during the hospital encounter of 12/02/19   Salicylate level   Result Value Ref Range    Salicylate Lvl <5.0 (L) 15.0 - 30.0 mg/dL     Acetaminophen  Results for orders placed or performed during the hospital encounter of 12/02/19   Acetaminophen level   Result Value Ref Range    Acetaminophen (Tylenol), Serum <3.0 (L) 10.0 - 20.0 ug/mL     Lab Results   Component Value Date    IRON 13 (L) 12/02/2019    TIBC 509 (H) 12/02/2019    FERRITIN <1 (L) 12/02/2019             Significant Imaging: none    Assessment/Plan:     * Severe episode of recurrent major depressive disorder, without psychotic features    Per psychiatry     Vitamin D deficiency     Vitamin D deficiency: Start Vitamin D3 50,000 units po q weel x 8 weeks (1/8 completed    Depression with suicidal ideation  Per psychiatry      Anemia  check Ferritin and iron panel     Lab Results   Component Value Date    IRON 13 (L) 12/02/2019    TIBC 509 (H) 12/02/2019    FERRITIN <1 (L) 12/02/2019     Severely low- start oral iron, consider IV ; discussed with Dr. Perez - will order iron rx  Venofer 100mg IV once today x 1 dose  F/U with PCP;  consider repeat in 1 week           VTE Risk Mitigation (From admission, onward)    None              Thank you for your consult. I will sign off. Please contact us if you have any additional questions.    Haylee Sheriff NP  Department of Hospital Medicine   [facility]
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
## 2019-12-04 NOTE — PLAN OF CARE
Shift note :patient is eating all meals and is taking all ordered medications . She is quiet and cooperative . She is in group , and is participating .

## 2019-12-04 NOTE — PLAN OF CARE
Pt calm and cooperative, guarded, speaks softly and slowly, isolates to room, however but starting to get out on unit more and interact more with staff and peers, Denies SI at this time, appetite good, med compliant

## 2019-12-05 PROBLEM — N89.8 VAGINAL DISCHARGE: Status: ACTIVE | Noted: 2019-12-05

## 2019-12-05 PROCEDURE — 99232 PR SUBSEQUENT HOSPITAL CARE,LEVL II: ICD-10-PCS | Mod: ,,, | Performed by: NURSE PRACTITIONER

## 2019-12-05 PROCEDURE — 63600175 PHARM REV CODE 636 W HCPCS: Performed by: NURSE PRACTITIONER

## 2019-12-05 PROCEDURE — 25000003 PHARM REV CODE 250: Performed by: PSYCHIATRY & NEUROLOGY

## 2019-12-05 PROCEDURE — 99232 SBSQ HOSP IP/OBS MODERATE 35: CPT | Mod: ,,, | Performed by: NURSE PRACTITIONER

## 2019-12-05 PROCEDURE — 90833 PR PSYCHOTHERAPY W/PATIENT W/E&M, 30 MIN (ADD ON): ICD-10-PCS | Mod: ,,, | Performed by: PSYCHIATRY & NEUROLOGY

## 2019-12-05 PROCEDURE — 25000003 PHARM REV CODE 250: Performed by: NURSE PRACTITIONER

## 2019-12-05 PROCEDURE — 11400000 HC PSYCH PRIVATE ROOM

## 2019-12-05 PROCEDURE — 99233 PR SUBSEQUENT HOSPITAL CARE,LEVL III: ICD-10-PCS | Mod: ,,, | Performed by: PSYCHIATRY & NEUROLOGY

## 2019-12-05 PROCEDURE — 87491 CHLMYD TRACH DNA AMP PROBE: CPT

## 2019-12-05 PROCEDURE — 99233 SBSQ HOSP IP/OBS HIGH 50: CPT | Mod: ,,, | Performed by: PSYCHIATRY & NEUROLOGY

## 2019-12-05 PROCEDURE — 90833 PSYTX W PT W E/M 30 MIN: CPT | Mod: ,,, | Performed by: PSYCHIATRY & NEUROLOGY

## 2019-12-05 RX ORDER — AZITHROMYCIN 250 MG/1
1000 TABLET, FILM COATED ORAL ONCE
Status: COMPLETED | OUTPATIENT
Start: 2019-12-05 | End: 2019-12-05

## 2019-12-05 RX ADMIN — LIDOCAINE HYDROCHLORIDE 250 MG: 10 INJECTION, SOLUTION EPIDURAL; INFILTRATION; INTRACAUDAL; PERINEURAL at 02:12

## 2019-12-05 RX ADMIN — FERROUS SULFATE TAB 325 MG (65 MG ELEMENTAL FE) 325 MG: 325 (65 FE) TAB at 08:12

## 2019-12-05 RX ADMIN — FERROUS SULFATE TAB 325 MG (65 MG ELEMENTAL FE) 325 MG: 325 (65 FE) TAB at 09:12

## 2019-12-05 RX ADMIN — QUETIAPINE FUMARATE 50 MG: 25 TABLET ORAL at 08:12

## 2019-12-05 RX ADMIN — AZITHROMYCIN 1000 MG: 250 TABLET, FILM COATED ORAL at 02:12

## 2019-12-05 RX ADMIN — ESCITALOPRAM OXALATE 10 MG: 10 TABLET ORAL at 09:12

## 2019-12-05 RX ADMIN — THERA TABS 1 TABLET: TAB at 09:12

## 2019-12-05 RX ADMIN — FOLIC ACID 1 MG: 1 TABLET ORAL at 09:12

## 2019-12-05 NOTE — PLAN OF CARE
Pt calm and cooperative, pt guarded, out on unit more, interacting better with staff and peers, denies SI at this time, med compliant, safety precautions maintained, will continue to monitor

## 2019-12-05 NOTE — PLAN OF CARE
Plan of care reviewed with patient, patient agrees with plan. States her mood is improving somewhat, denies suicidal ideations and homicidal ideations. Calm and cooperative. Accepts meals and snacks, compliant with medication. Environment clear and clutter-free. No falls. Promoted an individualized safety plan, effective coping skills, mood improvement and resolution of depression and suicidal ideations. Will continue to monitor for safety.
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
## 2019-12-05 NOTE — NURSING
Patient resting quietly in bed with eyes closed. Respirations even and unlabored appears asleep. Slept well for 8 hours. Safety maintained with rounds every 15 minutes. Bed fixed at lowest position. Path ways kept clear. No fall occured.

## 2019-12-05 NOTE — PLAN OF CARE
"  Problem: Adult Behavioral Health Plan of Care  Goal: Optimized Coping Skills in Response to Life Stressors  Intervention: Promote Effective Coping Strategies  Flowsheets (Taken 12/5/2019 1036)  Supportive Measures: active listening utilized; counseling provided; problem solving facilitated; positive reinforcement provided; verbalization of feelings encouraged; decision-making supported; relaxation techniques promoted; self-care encouraged; self-reflection promoted; self-responsibility promoted; journaling promoted; goal setting facilitated     Behavior: Patient attended psychotherapeutic group with appropriate grooming, smiling intermittently, withdrawn, "good" mood, relaxed posture, and intermittent/avoidant eye contact.       Intervention:  will engage patients in a process group based on strengths perspective and supportive therapy. Patients will be encouraged to express concerns and goals for treatment and discharge, as well as perceived barriers to progress.  will aid patients in prioritizing and making realistic goals, identifying strengths that will aid in transition, and verbalizing emotions.       Response: Patient stated that she has been in touch with her uncle, saying that her family was worried due to her not calling them. She smiled when she was talking about this. Pt hesitant and resisting to disclosing details about her stressors.       Plan:  will continue to encourage patient to explore and verbalize emotions, set goals to aid in preventing re-hospitalization, attend psychotherapeutic group, and follow up with aftercare appointments.  "

## 2019-12-05 NOTE — ASSESSMENT & PLAN NOTE
Pt with c/o vaginal discharge yesterday after my evaluation  Recent U/ A normal  Admits to recent unprotected intercourse  Will check urine G&C; IM Rocephin 250mg, Zithromax  Await G&C

## 2019-12-05 NOTE — CONSULTS
"[facility] Hospital Medicine  Consult Note    Patient Name: Tammy Lopez  MRN: [id number]  Admission Date: 12/2/2019  Hospital Length of Stay: 3 days  Attending Physician: Sarath Car MD   Primary Care Provider: Sherice Ross NP           Patient information was obtained from patient and ER records.     Consults  Subjective:     Principal Problem: Severe episode of recurrent major depressive disorder, without psychotic features    Chief Complaint: No chief complaint on file.       HPI:   Tammy Lopez is a 19 y.o. female with a past psychiatric history of "depression, anxiety and Bipolar," currently admitted to the inpatient unit with the following chief complaint: suicidal thoughts, depression  Lab Results   Component Value Date    IRON 13 (L) 12/02/2019    TIBC 509 (H) 12/02/2019    FERRITIN <1 (L) 12/02/2019     Pt denies heavy menstrual cycle or irregularity; she notes she has history of iron deficiency but does not take iron due to GI side effect. She reports chronic fatigue. Notes mother has iron deficiency also but is poor historian.       Past Medical History:   Diagnosis Date    ADHD (attention deficit hyperactivity disorder)     Anxiety     Borderline personality disorder     Depression     History of psychiatric hospitalization     Hx of psychiatric care     Psychiatric exam requested by authority     Psychiatric problem     PTSD (post-traumatic stress disorder)     Suicide attempt     4-5 by overdose and hanging     Therapy        No past surgical history on file.    Review of patient's allergies indicates:  No Known Allergies    No current facility-administered medications on file prior to encounter.      Current Outpatient Medications on File Prior to Encounter   Medication Sig    escitalopram oxalate (LEXAPRO) 20 MG tablet Take 1 tablet (20 mg total) by mouth once daily.    ferrous sulfate 325 (65 FE) MG EC tablet Take 1 tablet (325 mg total) " by mouth 2 (two) times daily.     Family History     None        Tobacco Use    Smoking status: Former Smoker     Packs/day: 0.00     Types: Vaping with nicotine    Smokeless tobacco: Never Used   Substance and Sexual Activity    Alcohol use: Never     Frequency: Never     Comment: occasionally    Drug use: Not Currently     Types: Marijuana    Sexual activity: Yes     Partners: Male     Birth control/protection: None     Comment: single     Review of Systems   Constitutional: Negative for chills and fever.   HENT: Negative for congestion and sore throat.    Respiratory: Negative for cough, chest tightness and shortness of breath.    Cardiovascular: Negative for chest pain, palpitations and leg swelling.   Gastrointestinal: Negative for abdominal pain, diarrhea, nausea and vomiting.   Genitourinary: Positive for vaginal discharge. Negative for flank pain, frequency and hematuria.   Musculoskeletal: Negative for back pain and neck pain.   Skin: Negative for rash and wound.   Neurological: Negative for dizziness, seizures, syncope and headaches.   Psychiatric/Behavioral: Negative for agitation, confusion and self-injury.     Objective:     Vital Signs (Most Recent):  Temp: 96.6 °F (35.9 °C) (12/05/19 0743)  Pulse: 81 (12/05/19 0743)  Resp: 18 (12/05/19 0743)  BP: 130/73 (12/05/19 0743) Vital Signs (24h Range):  Temp:  [96.6 °F (35.9 °C)-98.1 °F (36.7 °C)] 96.6 °F (35.9 °C)  Pulse:  [75-81] 81  Resp:  [16-18] 18  BP: (111-130)/(55-74) 130/73     Weight: 90.8 kg (200 lb 1.1 oz)  Body mass index is 32.29 kg/m².    Physical Exam   Constitutional: She is oriented to person, place, and time. She appears well-developed and well-nourished. No distress.   HENT:   Head: Normocephalic and atraumatic.   Eyes: Pupils are equal, round, and reactive to light.   Neck: No thyromegaly present.   Cardiovascular: Normal rate, regular rhythm, normal heart sounds and intact distal pulses.   No murmur heard.  Pulmonary/Chest: Effort  normal and breath sounds normal. No respiratory distress. She has no wheezes. She has no rales.   Abdominal: Soft. Bowel sounds are normal. She exhibits no distension and no mass. There is no tenderness.   Musculoskeletal: Normal range of motion. She exhibits no edema or deformity.   Lymphadenopathy:     She has no cervical adenopathy.   Neurological: She is alert and oriented to person, place, and time.   CN II visual fields full to confrontation  CN III, Iv, VI- pupils ERRL   CN III- no palsy  Nystagmus; none   Diplopia- none  ophthalmoparesis- none  CN V- facial sensation intact  CN VII- facial expression full and symmetric  CNVIII- normal  CN IV-Normal  CN X- normal  CN XI- Normal   CN XII normal      Skin: Skin is warm and dry. No rash noted. No erythema.   Nursing note and vitals reviewed.       Significant Labs:   UPT  Results for orders placed or performed during the hospital encounter of 01/30/16   POCT urine pregnancy   Result Value Ref Range    POC Preg Test, Ur Negative Negative     Acceptable Yes      U/A  Results for orders placed or performed during the hospital encounter of 12/02/19   Urinalysis   Result Value Ref Range    Specimen UA Urine, Clean Catch     Color, UA Yellow Yellow, Straw, Steffanie    Appearance, UA Clear Clear    pH, UA 6.0 5.0 - 8.0    Specific Gravity, UA >=1.030 (A) 1.005 - 1.030    Protein, UA Negative Negative    Glucose, UA Negative Negative    Ketones, UA Negative Negative    Bilirubin (UA) Negative Negative    Occult Blood UA Negative Negative    Nitrite, UA Negative Negative    Urobilinogen, UA Negative <2.0 EU/dL    Leukocytes, UA Negative Negative     UDS  Results for orders placed or performed during the hospital encounter of 12/02/19   Drug screen panel, emergency   Result Value Ref Range    Benzodiazepines Negative     Methadone metabolites Negative     Cocaine (Metab.) Negative     Opiate Scrn, Ur Negative     Barbiturate Screen, Ur Negative     Amphetamine  Screen, Ur Negative     THC Negative     Phencyclidine Negative     Creatinine, Random Ur 279.7 15.0 - 325.0 mg/dL    Toxicology Information SEE COMMENT      CBC  Results for orders placed or performed during the hospital encounter of 12/02/19   CBC auto differential   Result Value Ref Range    WBC 3.11 (L) 3.90 - 12.70 K/uL    RBC 3.93 (L) 4.00 - 5.40 M/uL    Hemoglobin 10.0 (L) 12.0 - 16.0 g/dL    Hematocrit 32.6 (L) 37.0 - 48.5 %    Mean Corpuscular Volume 83 82 - 98 fL    Mean Corpuscular Hemoglobin 25.4 (L) 27.0 - 31.0 pg    Mean Corpuscular Hemoglobin Conc 30.7 (L) 32.0 - 36.0 g/dL    RDW 14.6 (H) 11.5 - 14.5 %    Platelets 308 150 - 350 K/uL    MPV 10.2 9.2 - 12.9 fL    Immature Granulocytes 0.0 0.0 - 0.5 %    Gran # (ANC) 1.3 (L) 1.8 - 7.7 K/uL    Immature Grans (Abs) 0.00 0.00 - 0.04 K/uL    Lymph # 1.2 1.0 - 4.8 K/uL    Mono # 0.4 0.3 - 1.0 K/uL    Eos # 0.2 0.0 - 0.5 K/uL    Baso # 0.02 0.00 - 0.20 K/uL    nRBC 0 0 /100 WBC    Gran% 40.9 38.0 - 73.0 %    Lymph% 37.6 18.0 - 48.0 %    Mono% 14.1 4.0 - 15.0 %    Eosinophil% 6.8 0.0 - 8.0 %    Basophil% 0.6 0.0 - 1.9 %    Differential Method Automated      CMP  Results for orders placed or performed during the hospital encounter of 12/02/19   Comprehensive metabolic panel   Result Value Ref Range    Sodium 139 136 - 145 mmol/L    Potassium 3.6 3.5 - 5.1 mmol/L    Chloride 110 95 - 110 mmol/L    CO2 23 23 - 29 mmol/L    Glucose 92 70 - 110 mg/dL    BUN, Bld 8 6 - 20 mg/dL    Creatinine 0.7 0.5 - 1.4 mg/dL    Calcium 9.0 8.7 - 10.5 mg/dL    Total Protein 7.3 6.0 - 8.4 g/dL    Albumin 3.5 3.5 - 5.2 g/dL    Total Bilirubin 0.3 0.1 - 1.0 mg/dL    Alkaline Phosphatase 72 55 - 135 U/L    AST 13 10 - 40 U/L    ALT 11 10 - 44 U/L    Anion Gap 6 (L) 8 - 16 mmol/L    eGFR if African American >60 >60 mL/min/1.73 m^2    eGFR if non African American >60 >60 mL/min/1.73 m^2     TSH  Results for orders placed or performed during the hospital encounter of 12/02/19   TSH    Result Value Ref Range    TSH 0.278 (L) 0.400 - 4.000 uIU/mL     ETOH  Results for orders placed or performed during the hospital encounter of 12/02/19   Ethanol   Result Value Ref Range    Alcohol, Medical, Serum <10 <10 mg/dL     Salicylate  Results for orders placed or performed during the hospital encounter of 12/02/19   Salicylate level   Result Value Ref Range    Salicylate Lvl <5.0 (L) 15.0 - 30.0 mg/dL     Acetaminophen  Results for orders placed or performed during the hospital encounter of 12/02/19   Acetaminophen level   Result Value Ref Range    Acetaminophen (Tylenol), Serum <3.0 (L) 10.0 - 20.0 ug/mL     Lab Results   Component Value Date    IRON 13 (L) 12/02/2019    TIBC 509 (H) 12/02/2019    FERRITIN <1 (L) 12/02/2019             Significant Imaging: none    Assessment/Plan:     * Severe episode of recurrent major depressive disorder, without psychotic features    Per psychiatry     Vaginal discharge  Pt with c/o vaginal discharge yesterday after my evaluation  Recent U/ A normal  Admits to recent unprotected intercourse  Will check urine G&C; IM Rocephin 250mg, Zithromax  Await G&C       Vitamin D deficiency     Vitamin D deficiency: Start Vitamin D3 50,000 units po q weel x 8 weeks (1/8 completed    Depression with suicidal ideation  Per psychiatry      Anemia  check Ferritin and iron panel     Lab Results   Component Value Date    IRON 13 (L) 12/02/2019    TIBC 509 (H) 12/02/2019    FERRITIN <1 (L) 12/02/2019     Severely low- start oral iron, consider IV ; discussed with Dr. Perez - will order iron rx  Recommended Venofer 100mg IV once but unable to administer ast U inpatient; nees f/U with PCP as OP         VTE Risk Mitigation (From admission, onward)    None              Thank you for your consult. I will sign off. Please contact us if you have any additional questions.    Haylee Sheriff, NP  Department of Hospital Medicine   [facility]

## 2019-12-05 NOTE — SUBJECTIVE & OBJECTIVE
Past Medical History:   Diagnosis Date    ADHD (attention deficit hyperactivity disorder)     Anxiety     Borderline personality disorder     Depression     History of psychiatric hospitalization     Hx of psychiatric care     Psychiatric exam requested by authority     Psychiatric problem     PTSD (post-traumatic stress disorder)     Suicide attempt     4-5 by overdose and hanging     Therapy        No past surgical history on file.    Review of patient's allergies indicates:  No Known Allergies    No current facility-administered medications on file prior to encounter.      Current Outpatient Medications on File Prior to Encounter   Medication Sig    escitalopram oxalate (LEXAPRO) 20 MG tablet Take 1 tablet (20 mg total) by mouth once daily.    ferrous sulfate 325 (65 FE) MG EC tablet Take 1 tablet (325 mg total) by mouth 2 (two) times daily.     Family History     None        Tobacco Use    Smoking status: Former Smoker     Packs/day: 0.00     Types: Vaping with nicotine    Smokeless tobacco: Never Used   Substance and Sexual Activity    Alcohol use: Never     Frequency: Never     Comment: occasionally    Drug use: Not Currently     Types: Marijuana    Sexual activity: Yes     Partners: Male     Birth control/protection: None     Comment: single     Review of Systems   Constitutional: Negative for chills and fever.   HENT: Negative for congestion and sore throat.    Respiratory: Negative for cough, chest tightness and shortness of breath.    Cardiovascular: Negative for chest pain, palpitations and leg swelling.   Gastrointestinal: Negative for abdominal pain, diarrhea, nausea and vomiting.   Genitourinary: Positive for vaginal discharge. Negative for flank pain, frequency and hematuria.   Musculoskeletal: Negative for back pain and neck pain.   Skin: Negative for rash and wound.   Neurological: Negative for dizziness, seizures, syncope and headaches.   Psychiatric/Behavioral: Negative for  agitation, confusion and self-injury.     Objective:     Vital Signs (Most Recent):  Temp: 96.6 °F (35.9 °C) (12/05/19 0743)  Pulse: 81 (12/05/19 0743)  Resp: 18 (12/05/19 0743)  BP: 130/73 (12/05/19 0743) Vital Signs (24h Range):  Temp:  [96.6 °F (35.9 °C)-98.1 °F (36.7 °C)] 96.6 °F (35.9 °C)  Pulse:  [75-81] 81  Resp:  [16-18] 18  BP: (111-130)/(55-74) 130/73     Weight: 90.8 kg (200 lb 1.1 oz)  Body mass index is 32.29 kg/m².    Physical Exam   Constitutional: She is oriented to person, place, and time. She appears well-developed and well-nourished. No distress.   HENT:   Head: Normocephalic and atraumatic.   Eyes: Pupils are equal, round, and reactive to light.   Neck: No thyromegaly present.   Cardiovascular: Normal rate, regular rhythm, normal heart sounds and intact distal pulses.   No murmur heard.  Pulmonary/Chest: Effort normal and breath sounds normal. No respiratory distress. She has no wheezes. She has no rales.   Abdominal: Soft. Bowel sounds are normal. She exhibits no distension and no mass. There is no tenderness.   Musculoskeletal: Normal range of motion. She exhibits no edema or deformity.   Lymphadenopathy:     She has no cervical adenopathy.   Neurological: She is alert and oriented to person, place, and time.   CN II visual fields full to confrontation  CN III, Iv, VI- pupils ERRL   CN III- no palsy  Nystagmus; none   Diplopia- none  ophthalmoparesis- none  CN V- facial sensation intact  CN VII- facial expression full and symmetric  CNVIII- normal  CN IV-Normal  CN X- normal  CN XI- Normal   CN XII normal      Skin: Skin is warm and dry. No rash noted. No erythema.   Nursing note and vitals reviewed.       Significant Labs:   UPT  Results for orders placed or performed during the hospital encounter of 01/30/16   POCT urine pregnancy   Result Value Ref Range    POC Preg Test, Ur Negative Negative     Acceptable Yes      U/A  Results for orders placed or performed during the  hospital encounter of 12/02/19   Urinalysis   Result Value Ref Range    Specimen UA Urine, Clean Catch     Color, UA Yellow Yellow, Straw, Steffanie    Appearance, UA Clear Clear    pH, UA 6.0 5.0 - 8.0    Specific Gravity, UA >=1.030 (A) 1.005 - 1.030    Protein, UA Negative Negative    Glucose, UA Negative Negative    Ketones, UA Negative Negative    Bilirubin (UA) Negative Negative    Occult Blood UA Negative Negative    Nitrite, UA Negative Negative    Urobilinogen, UA Negative <2.0 EU/dL    Leukocytes, UA Negative Negative     UDS  Results for orders placed or performed during the hospital encounter of 12/02/19   Drug screen panel, emergency   Result Value Ref Range    Benzodiazepines Negative     Methadone metabolites Negative     Cocaine (Metab.) Negative     Opiate Scrn, Ur Negative     Barbiturate Screen, Ur Negative     Amphetamine Screen, Ur Negative     THC Negative     Phencyclidine Negative     Creatinine, Random Ur 279.7 15.0 - 325.0 mg/dL    Toxicology Information SEE COMMENT      CBC  Results for orders placed or performed during the hospital encounter of 12/02/19   CBC auto differential   Result Value Ref Range    WBC 3.11 (L) 3.90 - 12.70 K/uL    RBC 3.93 (L) 4.00 - 5.40 M/uL    Hemoglobin 10.0 (L) 12.0 - 16.0 g/dL    Hematocrit 32.6 (L) 37.0 - 48.5 %    Mean Corpuscular Volume 83 82 - 98 fL    Mean Corpuscular Hemoglobin 25.4 (L) 27.0 - 31.0 pg    Mean Corpuscular Hemoglobin Conc 30.7 (L) 32.0 - 36.0 g/dL    RDW 14.6 (H) 11.5 - 14.5 %    Platelets 308 150 - 350 K/uL    MPV 10.2 9.2 - 12.9 fL    Immature Granulocytes 0.0 0.0 - 0.5 %    Gran # (ANC) 1.3 (L) 1.8 - 7.7 K/uL    Immature Grans (Abs) 0.00 0.00 - 0.04 K/uL    Lymph # 1.2 1.0 - 4.8 K/uL    Mono # 0.4 0.3 - 1.0 K/uL    Eos # 0.2 0.0 - 0.5 K/uL    Baso # 0.02 0.00 - 0.20 K/uL    nRBC 0 0 /100 WBC    Gran% 40.9 38.0 - 73.0 %    Lymph% 37.6 18.0 - 48.0 %    Mono% 14.1 4.0 - 15.0 %    Eosinophil% 6.8 0.0 - 8.0 %    Basophil% 0.6 0.0 - 1.9 %     Differential Method Automated      CMP  Results for orders placed or performed during the hospital encounter of 12/02/19   Comprehensive metabolic panel   Result Value Ref Range    Sodium 139 136 - 145 mmol/L    Potassium 3.6 3.5 - 5.1 mmol/L    Chloride 110 95 - 110 mmol/L    CO2 23 23 - 29 mmol/L    Glucose 92 70 - 110 mg/dL    BUN, Bld 8 6 - 20 mg/dL    Creatinine 0.7 0.5 - 1.4 mg/dL    Calcium 9.0 8.7 - 10.5 mg/dL    Total Protein 7.3 6.0 - 8.4 g/dL    Albumin 3.5 3.5 - 5.2 g/dL    Total Bilirubin 0.3 0.1 - 1.0 mg/dL    Alkaline Phosphatase 72 55 - 135 U/L    AST 13 10 - 40 U/L    ALT 11 10 - 44 U/L    Anion Gap 6 (L) 8 - 16 mmol/L    eGFR if African American >60 >60 mL/min/1.73 m^2    eGFR if non African American >60 >60 mL/min/1.73 m^2     TSH  Results for orders placed or performed during the hospital encounter of 12/02/19   TSH   Result Value Ref Range    TSH 0.278 (L) 0.400 - 4.000 uIU/mL     ETOH  Results for orders placed or performed during the hospital encounter of 12/02/19   Ethanol   Result Value Ref Range    Alcohol, Medical, Serum <10 <10 mg/dL     Salicylate  Results for orders placed or performed during the hospital encounter of 12/02/19   Salicylate level   Result Value Ref Range    Salicylate Lvl <5.0 (L) 15.0 - 30.0 mg/dL     Acetaminophen  Results for orders placed or performed during the hospital encounter of 12/02/19   Acetaminophen level   Result Value Ref Range    Acetaminophen (Tylenol), Serum <3.0 (L) 10.0 - 20.0 ug/mL     Lab Results   Component Value Date    IRON 13 (L) 12/02/2019    TIBC 509 (H) 12/02/2019    FERRITIN <1 (L) 12/02/2019             Significant Imaging: none

## 2019-12-05 NOTE — PSYCH
"Problem: Adult Behavioral Health Plan of Care  Goal: Optimized Coping Skills in Response to Life Stressors  Intervention: Promote Effective Coping Strategies active listening utilized;counseling provided;positive reinforcement provided;verbalization of feelings encouraged;problem solving facilitated;relaxation techniques promoted;self-care encouraged;self-reflection promoted;self-responsibility promoted;decision-making supported;goal setting facilitated;journaling promoted     Behavior: Patient attended social service group dressed in personal clothing, fair grooming with congruent affect flat , and clam  mood.  Patient remained engaged and attentive.     Intervention: Social  will engage patients in group discussion focused on countering negative thoughts and repetitive behavior. Social  will use written material, "There is a whole in the sidewalk, by Alejandra Royal.Patients will be given time to express thoughts, concerns and goals for treatment and discharge, as well as perceived barriers to progress.     Response: Patient the identified her hole in the sidewalk, by stating," my hole in the sidewalk is not taking my medication and ending back in the hospital.I "The patient also stated her other street is to take her medication and not think as much about what people say."      Plan: Social  will continue to encourage patient to explore and verbalize emotions, set goals to aid in preventing re-hospitalization, attend psychotherapeutic group, and follow up with aftercare appointments.             "
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
## 2019-12-05 NOTE — ASSESSMENT & PLAN NOTE
check Ferritin and iron panel     Lab Results   Component Value Date    IRON 13 (L) 12/02/2019    TIBC 509 (H) 12/02/2019    FERRITIN <1 (L) 12/02/2019     Severely low- start oral iron, consider IV ; discussed with Dr. Perez - will order iron rx  Recommended Venofer 100mg IV once but unable to administer ast [facility] inpatient; nees f/U with PCP as OP
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
## 2019-12-05 NOTE — HOSPITAL COURSE
Re consulted; pt with c/o yellow vaginal discharge. Denies itching.  Notes she has had unprotected intercourse   Discussed unable to administer IV iron here due to inpatient [facility] restrictions;

## 2019-12-06 LAB
C TRACH DNA SPEC QL NAA+PROBE: NOT DETECTED
N GONORRHOEA DNA SPEC QL NAA+PROBE: NOT DETECTED

## 2019-12-06 PROCEDURE — 90833 PSYTX W PT W E/M 30 MIN: CPT | Mod: ,,, | Performed by: PSYCHIATRY & NEUROLOGY

## 2019-12-06 PROCEDURE — 99233 PR SUBSEQUENT HOSPITAL CARE,LEVL III: ICD-10-PCS | Mod: ,,, | Performed by: PSYCHIATRY & NEUROLOGY

## 2019-12-06 PROCEDURE — 90833 PR PSYCHOTHERAPY W/PATIENT W/E&M, 30 MIN (ADD ON): ICD-10-PCS | Mod: ,,, | Performed by: PSYCHIATRY & NEUROLOGY

## 2019-12-06 PROCEDURE — 25000003 PHARM REV CODE 250: Performed by: PSYCHIATRY & NEUROLOGY

## 2019-12-06 PROCEDURE — 99233 SBSQ HOSP IP/OBS HIGH 50: CPT | Mod: ,,, | Performed by: PSYCHIATRY & NEUROLOGY

## 2019-12-06 PROCEDURE — 11400000 HC PSYCH PRIVATE ROOM

## 2019-12-06 RX ADMIN — FERROUS SULFATE TAB 325 MG (65 MG ELEMENTAL FE) 325 MG: 325 (65 FE) TAB at 09:12

## 2019-12-06 RX ADMIN — ESCITALOPRAM OXALATE 10 MG: 10 TABLET ORAL at 09:12

## 2019-12-06 RX ADMIN — QUETIAPINE FUMARATE 50 MG: 25 TABLET ORAL at 08:12

## 2019-12-06 RX ADMIN — THERA TABS 1 TABLET: TAB at 09:12

## 2019-12-06 RX ADMIN — FOLIC ACID 1 MG: 1 TABLET ORAL at 09:12

## 2019-12-06 RX ADMIN — FERROUS SULFATE TAB 325 MG (65 MG ELEMENTAL FE) 325 MG: 325 (65 FE) TAB at 08:12

## 2019-12-06 NOTE — PLAN OF CARE
Plan of care reviewed with patient, patient agrees with care plan. States her mood is improved, denies suicidal ideations and homicidal ideations. Calm and cooperative. Accepts meals and snacks, compliant with medication. Environment clear and clutter-free. No falls. Focused on discharge to home. Nurse practitioner discussed results of testing for gonorrhea and chlamydia. Promoted an individualized safety plan, effective coping skills, mood improvement and resolution of depression and suicidal ideations. Will continue to monitor for safety.

## 2019-12-06 NOTE — PLAN OF CARE
Pt accepts medications and snacks without difficulty. Calm and cooperative. Out on unit interacting with peers. Reports mood has improved. Denies SI, HI and AVH. No acute distress noted. No self harming behaviors noted. Will continue to monitor for safety.
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
## 2019-12-06 NOTE — NURSING
Patient resting quietly in bed with eyes closed.  Respirations even and unlabored appears asleep.  Slept well for 7 hours.  Safety maintained with rounds every 15 minutes. Bed fixed at lowest position.  Path ways kept clear.  No fall occured .

## 2019-12-06 NOTE — PSYCH
"Problem: Adult Behavioral Health Plan of Care  Goal: Optimized Coping Skills in Response to Life Stressors  Intervention: Promote Effective Coping Strategies   active listening utilized;counseling provided;positive reinforcement provided;verbalization of feelings encouraged;problem solving facilitated;relaxation techniques promoted;self-care encouraged;self-reflection promoted;self-responsibility promoted;decision-making supported;goal setting facilitated;journaling promoted     Behavior: Patient attended social service group dressed in personal clothing, fair grooming with congruent affect flat , and clam  mood.  Patient remained engaged and attentive.     Intervention: Social  will engage patients in group discussion focused on countering negative thoughts and repetitive behavior. Social  will use written material, "How To Think Positively About Your self."Patients will be given time to express thoughts, concerns and goals for treatment and discharge, as well as perceived barriers to progress.     Response: Patient  identified ways to think positively about herself. She shared with the group that she will work on respect and love, learning that he is a unique person.   Plan: Social  will continue to encourage patient to explore and verbalize emotions, set goals to aid in preventing re-hospitalization, attend psychotherapeutic group, and follow up with aftercare appointments.        "

## 2019-12-06 NOTE — PLAN OF CARE
Problem: Adult Behavioral Health Plan of Care  Goal: Develops/Participates in Therapeutic [place] to Support Successful Transition  Intervention: Foster Therapeutic [place]  Flowsheets (Taken 12/6/2019 0689)  Trust Relationship/Rapport: care explained; choices provided; thoughts/feelings acknowledged; emotional support provided; empathic listening provided; questions answered; questions encouraged; reassurance provided     Author Jeffry, patient's uncle agrees to aid in safety planning by removing the patient's medications and holding them at his house, a few houses down from hers, with the goal of reducing access to means. Patient plan is to take a walk to his house in the morning to get the medications she needs to take. He says that he and his wife will dispense the medications to her.

## 2019-12-06 NOTE — PROGRESS NOTES
12/06/19 1040   [facility] Group Therapy   Group Name Therapeutic Recreation   Specific Interventions Coping Skills Training   Participation Level Active;Appropriate;Attentive;Sharing   Participation Quality Cooperative   Insight/Motivation Applies New Skills;Good   Affect/Mood Display Appropriate   Cognition Alert   Psychomotor WNL   Patient shows interest, becomes involved, enjoys the activity.

## 2019-12-07 PROBLEM — F32.A DEPRESSION WITH SUICIDAL IDEATION: Status: RESOLVED | Noted: 2019-12-02 | Resolved: 2019-12-07

## 2019-12-07 PROBLEM — D50.8 IRON DEFICIENCY ANEMIA SECONDARY TO INADEQUATE DIETARY IRON INTAKE: Status: ACTIVE | Noted: 2018-12-02

## 2019-12-07 PROBLEM — R45.851 DEPRESSION WITH SUICIDAL IDEATION: Status: RESOLVED | Noted: 2019-12-02 | Resolved: 2019-12-07

## 2019-12-07 PROCEDURE — 90833 PR PSYCHOTHERAPY W/PATIENT W/E&M, 30 MIN (ADD ON): ICD-10-PCS | Mod: ,,, | Performed by: PSYCHIATRY & NEUROLOGY

## 2019-12-07 PROCEDURE — 99233 PR SUBSEQUENT HOSPITAL CARE,LEVL III: ICD-10-PCS | Mod: ,,, | Performed by: PSYCHIATRY & NEUROLOGY

## 2019-12-07 PROCEDURE — 11400000 HC PSYCH PRIVATE ROOM

## 2019-12-07 PROCEDURE — 25000003 PHARM REV CODE 250: Performed by: PSYCHIATRY & NEUROLOGY

## 2019-12-07 PROCEDURE — 90833 PSYTX W PT W E/M 30 MIN: CPT | Mod: ,,, | Performed by: PSYCHIATRY & NEUROLOGY

## 2019-12-07 PROCEDURE — 99233 SBSQ HOSP IP/OBS HIGH 50: CPT | Mod: ,,, | Performed by: PSYCHIATRY & NEUROLOGY

## 2019-12-07 RX ORDER — ESCITALOPRAM OXALATE 10 MG/1
10 TABLET ORAL DAILY
Qty: 30 TABLET | Refills: 2 | Status: ON HOLD | OUTPATIENT
Start: 2019-12-08 | End: 2020-03-26 | Stop reason: HOSPADM

## 2019-12-07 RX ORDER — QUETIAPINE FUMARATE 50 MG/1
50 TABLET, FILM COATED ORAL NIGHTLY
Qty: 30 TABLET | Refills: 2 | Status: ON HOLD | OUTPATIENT
Start: 2019-12-07 | End: 2020-03-26 | Stop reason: HOSPADM

## 2019-12-07 RX ORDER — FERROUS SULFATE 325(65) MG
325 TABLET, DELAYED RELEASE (ENTERIC COATED) ORAL 2 TIMES DAILY
Qty: 60 TABLET | Refills: 2 | Status: ON HOLD | OUTPATIENT
Start: 2019-12-07 | End: 2020-03-26 | Stop reason: HOSPADM

## 2019-12-07 RX ADMIN — THERA TABS 1 TABLET: TAB at 08:12

## 2019-12-07 RX ADMIN — ESCITALOPRAM OXALATE 10 MG: 10 TABLET ORAL at 08:12

## 2019-12-07 RX ADMIN — FOLIC ACID 1 MG: 1 TABLET ORAL at 08:12

## 2019-12-07 RX ADMIN — FERROUS SULFATE TAB 325 MG (65 MG ELEMENTAL FE) 325 MG: 325 (65 FE) TAB at 08:12

## 2019-12-07 RX ADMIN — QUETIAPINE FUMARATE 50 MG: 25 TABLET ORAL at 08:12
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
## 2019-12-07 NOTE — PLAN OF CARE
Plan of care reviewed.  Denies intent to harm self or others at this time.  Accepts snacks and medications.  Gait steady, no falls.  quiet but is interacting with staff and peers. Smiles and says she is doing good. Promoted an individualized safety plan, reality-based interactions, effective coping strategies, and impulse control.  Will continue to monitor for safety.

## 2019-12-07 NOTE — PLAN OF CARE
Shift note : patient is eating all meals and is taking all ordered medications . She is attending all groups to improve her coping skills . She is going home tomorrow .

## 2019-12-08 VITALS
TEMPERATURE: 97 F | DIASTOLIC BLOOD PRESSURE: 68 MMHG | BODY MASS INDEX: 32.53 KG/M2 | HEART RATE: 76 BPM | HEIGHT: 66 IN | SYSTOLIC BLOOD PRESSURE: 123 MMHG | RESPIRATION RATE: 18 BRPM | WEIGHT: 202.38 LBS

## 2019-12-08 PROCEDURE — 99239 PR HOSPITAL DISCHARGE DAY,>30 MIN: ICD-10-PCS | Mod: ,,, | Performed by: PSYCHIATRY & NEUROLOGY

## 2019-12-08 PROCEDURE — 25000003 PHARM REV CODE 250: Performed by: PSYCHIATRY & NEUROLOGY

## 2019-12-08 PROCEDURE — 99239 HOSP IP/OBS DSCHRG MGMT >30: CPT | Mod: ,,, | Performed by: PSYCHIATRY & NEUROLOGY

## 2019-12-08 RX ADMIN — THERA TABS 1 TABLET: TAB at 08:12

## 2019-12-08 RX ADMIN — ESCITALOPRAM OXALATE 10 MG: 10 TABLET ORAL at 08:12

## 2019-12-08 RX ADMIN — FERROUS SULFATE TAB 325 MG (65 MG ELEMENTAL FE) 325 MG: 325 (65 FE) TAB at 08:12

## 2019-12-08 RX ADMIN — FOLIC ACID 1 MG: 1 TABLET ORAL at 08:12

## 2019-12-08 NOTE — PLAN OF CARE
Up and about the unit.  Spending time in the dayroom talking with peers and watching TV.  Spoke on the phons.  Calm and cooperative.  Accepting meals and snacks.  Compliant with medications and unit rules.  All precautions maintained.  Denies SI/HI.  Improved mood and affect.

## 2019-12-08 NOTE — PLAN OF CARE
Lying quiet in bed, eyes closed, respiration even and unlabored, appearing asleep.  Slept well most all shift except was awake quiet in her bed from 6045-0020 without complaint..  Safety and precautions maintained with rounds every 15 minutes, bed is fixed in a low position and pathways kept clear.  No fall occurred.
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
## 2019-12-08 NOTE — NURSING
Pt discharge arranged for today.  All belongings accounted for and will be returned upon discharge.  DC instructions and meds reviewed with pt, understanding verbalized.  Denies any S/I or H/I at this time.  Pt will be following up at [facility] at [address] [place] [address].  Phone number [phone].  Appointment is on 12/11/19 at 1585.  Pt does not use tobacco products and had negative uds on admit.  AVS was faxed on 12/8/19 at [phone].  Pt will have a ride to come get her.

## 2020-01-01 ENCOUNTER — HOSPITAL ENCOUNTER (EMERGENCY)
Facility: HOSPITAL | Age: 20
Discharge: HOME | End: 2020-01-01
Attending: SURGERY
Payer: MEDICAID

## 2020-01-01 VITALS
HEART RATE: 86 BPM | OXYGEN SATURATION: 100 % | BODY MASS INDEX: 32.56 KG/M2 | SYSTOLIC BLOOD PRESSURE: 138 MMHG | WEIGHT: 201.75 LBS | DIASTOLIC BLOOD PRESSURE: 83 MMHG | RESPIRATION RATE: 18 BRPM | TEMPERATURE: 98 F

## 2020-01-01 DIAGNOSIS — Z20.2 POSSIBLE EXPOSURE TO STD: ICD-10-CM

## 2020-01-01 DIAGNOSIS — B37.31 VAGINAL CANDIDIASIS: Primary | ICD-10-CM

## 2020-01-01 LAB
B-HCG UR QL: NEGATIVE
BACTERIA #/AREA URNS HPF: ABNORMAL /HPF
BILIRUB UR QL STRIP: NEGATIVE
CLARITY UR: CLEAR
COLOR UR: YELLOW
GLUCOSE UR QL STRIP: NEGATIVE
HGB UR QL STRIP: NEGATIVE
KETONES UR QL STRIP: NEGATIVE
LEUKOCYTE ESTERASE UR QL STRIP: ABNORMAL
MICROSCOPIC COMMENT: ABNORMAL
NITRITE UR QL STRIP: NEGATIVE
PH UR STRIP: 5 [PH] (ref 5–8)
PROT UR QL STRIP: NEGATIVE
RBC #/AREA URNS HPF: 0 /HPF (ref 0–4)
SP GR UR STRIP: >=1.03 (ref 1–1.03)
URN SPEC COLLECT METH UR: ABNORMAL
UROBILINOGEN UR STRIP-ACNC: NEGATIVE EU/DL
WBC #/AREA URNS HPF: 5 /HPF (ref 0–5)
YEAST URNS QL MICRO: ABNORMAL

## 2020-01-01 PROCEDURE — 25000003 PHARM REV CODE 250: Performed by: NURSE PRACTITIONER

## 2020-01-01 PROCEDURE — 96372 THER/PROPH/DIAG INJ SC/IM: CPT

## 2020-01-01 PROCEDURE — 81025 URINE PREGNANCY TEST: CPT

## 2020-01-01 PROCEDURE — 63600175 PHARM REV CODE 636 W HCPCS: Performed by: NURSE PRACTITIONER

## 2020-01-01 PROCEDURE — 87491 CHLMYD TRACH DNA AMP PROBE: CPT

## 2020-01-01 PROCEDURE — 99284 EMERGENCY DEPT VISIT MOD MDM: CPT | Mod: 25

## 2020-01-01 PROCEDURE — 81000 URINALYSIS NONAUTO W/SCOPE: CPT

## 2020-01-01 RX ORDER — FLUCONAZOLE 150 MG/1
150 TABLET ORAL
Status: COMPLETED | OUTPATIENT
Start: 2020-01-01 | End: 2020-01-01

## 2020-01-01 RX ORDER — CEFTRIAXONE 500 MG/1
250 INJECTION, POWDER, FOR SOLUTION INTRAMUSCULAR; INTRAVENOUS
Status: COMPLETED | OUTPATIENT
Start: 2020-01-01 | End: 2020-01-01

## 2020-01-01 RX ORDER — AZITHROMYCIN 250 MG/1
1000 TABLET, FILM COATED ORAL
Status: COMPLETED | OUTPATIENT
Start: 2020-01-01 | End: 2020-01-01

## 2020-01-01 RX ADMIN — CEFTRIAXONE SODIUM 250 MG: 500 INJECTION, POWDER, FOR SOLUTION INTRAMUSCULAR; INTRAVENOUS at 12:01

## 2020-01-01 RX ADMIN — AZITHROMYCIN MONOHYDRATE 1000 MG: 250 TABLET ORAL at 12:01

## 2020-01-01 RX ADMIN — FLUCONAZOLE 150 MG: 150 TABLET ORAL at 12:01

## 2020-01-01 NOTE — ED TRIAGE NOTES
"19 y.o. female presents to ER ED 01/ED 01A   Chief Complaint   Patient presents with    Vaginal Discharge   .   Pt states "I had intercourse Friday and now it swole up"  C/o vaginal discomfort and burning upon urination  "

## 2020-01-01 NOTE — DISCHARGE INSTRUCTIONS
**Follow up with GYN in 24-48 hours. Return to ER with worsening of symptoms. No sexual activity for the next 7 days. In the future, use condoms to prevent STD exposure.     **Our goal in the emergency department is to always give you outstanding care and exceptional service. You may receive a survey by mail or e-mail in the next week regarding your experience in our ED. We would greatly appreciate your completing and returning the survey. Your feedback provides us with a way to recognize our staff who give very good care and it helps us learn how to improve when your experience was below our aspiration of excellence.

## 2020-01-01 NOTE — ED PROVIDER NOTES
Encounter Date: 2020       History     Chief Complaint   Patient presents with    Vaginal Discharge     18 y/o female presents with vaginal discharge.  Patient reports that she had unprotected sex 5 days ago.  Reports vaginal discharge and vaginal swelling since then.  Reports that she thinks she has a yeast infection.   Describes vaginal discharge as a white and thick.  Denies odor.  Reports dysuria.  No abdominal pain, nausea, vomiting, diarrhea, or fever.  Requesting STD check and treatment for gonorrhea and chlamydia.  LMP 19.  Has appointment with gyn in 2 days.    The history is provided by the patient.     Review of patient's allergies indicates:  No Known Allergies  Past Medical History:   Diagnosis Date    ADHD (attention deficit hyperactivity disorder)     Anxiety     Borderline personality disorder     Depression     Fatigue     Headache     History of psychiatric hospitalization     Hx of psychiatric care     Panic disorder     Psychiatric exam requested by authority     Psychiatric problem     PTSD (post-traumatic stress disorder)     Suicide attempt     4-5 by overdose and hanging     Therapy      No past surgical history on file.  Family History   Problem Relation Age of Onset    Bipolar disorder Maternal Grandmother     Diabetes Maternal Grandmother     Breast cancer Neg Hx     Colon cancer Neg Hx     Ovarian cancer Neg Hx      Social History     Tobacco Use    Smoking status: Former Smoker     Packs/day: 0.00     Types: Vaping with nicotine, Cigars     Start date: 2019     Last attempt to quit: 2019     Years since quittin.1    Smokeless tobacco: Never Used   Substance Use Topics    Alcohol use: Not Currently     Frequency: Never     Comment: occasionally    Drug use: Not Currently     Types: Marijuana     Review of Systems   Constitutional: Negative for fever.   HENT: Negative for congestion, ear pain, rhinorrhea, sore throat and trouble swallowing.     Eyes: Negative for pain and redness.   Respiratory: Negative for cough and shortness of breath.    Cardiovascular: Negative for chest pain.   Gastrointestinal: Negative for abdominal pain, diarrhea, nausea and vomiting.   Genitourinary: Positive for dysuria, vaginal discharge and vaginal pain (and swelling). Negative for difficulty urinating, frequency, pelvic pain and urgency.   Musculoskeletal: Negative for arthralgias, back pain, myalgias and neck pain.   Skin: Negative for rash and wound.   Neurological: Negative for seizures, weakness and headaches.   Psychiatric/Behavioral: Negative.        Physical Exam     Initial Vitals [01/01/20 1123]   BP Pulse Resp Temp SpO2   138/83 86 18 98.3 °F (36.8 °C) 100 %      MAP       --         Physical Exam    Nursing note and vitals reviewed.  Constitutional: No distress.   HENT:   Head: Normocephalic and atraumatic.   Right Ear: External ear normal.   Left Ear: External ear normal.   Nose: Nose normal.   Mouth/Throat: Oropharynx is clear and moist and mucous membranes are normal.   Eyes: Conjunctivae, EOM and lids are normal. Pupils are equal, round, and reactive to light.   Neck: Neck supple.   Cardiovascular: Normal rate, regular rhythm, S1 normal, S2 normal, normal heart sounds and intact distal pulses.   Pulmonary/Chest: Effort normal and breath sounds normal. No respiratory distress.   Abdominal: Soft. Bowel sounds are normal. There is no tenderness.   Genitourinary: There is tenderness on the right labia. There is no rash, lesion or injury on the right labia. There is tenderness on the left labia. There is no rash, lesion or injury on the left labia.   Genitourinary Comments: Chaperone present for exam. Vaginal swelling noted. Mild tenderness with palpation. White, thick discharge noted consistent with yeast. Complete pelvic exam deferred till gyn appointment in 2 days.   Musculoskeletal: Normal range of motion.   Neurological: She is alert and oriented to person,  place, and time. She has normal strength. GCS eye subscore is 4. GCS verbal subscore is 5. GCS motor subscore is 6.   Skin: Skin is warm and dry. Capillary refill takes less than 2 seconds. No rash noted.   Psychiatric: She has a normal mood and affect. Her speech is normal and behavior is normal.         ED Course   Procedures  Labs Reviewed   URINALYSIS, REFLEX TO URINE CULTURE - Abnormal; Notable for the following components:       Result Value    Specific Gravity, UA >=1.030 (*)     Leukocytes, UA 1+ (*)     All other components within normal limits    Narrative:     Preferred Collection Type->Urine, Clean Catch   URINALYSIS MICROSCOPIC - Abnormal; Notable for the following components:    Yeast, UA Occasional (*)     All other components within normal limits    Narrative:     Preferred Collection Type->Urine, Clean Catch   C. TRACHOMATIS/N. GONORRHOEAE BY AMP DNA   PREGNANCY TEST, URINE RAPID                               Medications   azithromycin tablet 1,000 mg (has no administration in time range)   cefTRIAXone injection 250 mg (has no administration in time range)   fluconazole tablet 150 mg (has no administration in time range)                      Clinical Impression:       ICD-10-CM ICD-9-CM   1. Vaginal candidiasis B37.3 112.1   2. Possible exposure to STD Z20.2 V01.6       Disposition:   Disposition: Discharged  Condition: Stable    The patient acknowledges that close follow up with medical provider is required. Instructed to follow up with GYN within 2 days. Patient was given specific return precautions. The patient agrees to comply with all instruction and directions given in the ER.                      Prerna Garcia NP  01/01/20 3703

## 2020-01-02 LAB
C TRACH DNA SPEC QL NAA+PROBE: NOT DETECTED
N GONORRHOEA DNA SPEC QL NAA+PROBE: NOT DETECTED

## 2020-01-03 ENCOUNTER — OFFICE VISIT (OUTPATIENT)
Dept: OBSTETRICS AND GYNECOLOGY | Facility: CLINIC | Age: 20
End: 2020-01-03
Payer: MEDICAID

## 2020-01-03 VITALS
SYSTOLIC BLOOD PRESSURE: 122 MMHG | WEIGHT: 209 LBS | DIASTOLIC BLOOD PRESSURE: 64 MMHG | RESPIRATION RATE: 13 BRPM | BODY MASS INDEX: 33.73 KG/M2 | HEART RATE: 68 BPM

## 2020-01-03 DIAGNOSIS — N89.8 VAGINAL IRRITATION: Primary | ICD-10-CM

## 2020-01-03 PROCEDURE — 99213 OFFICE O/P EST LOW 20 MIN: CPT | Mod: S$PBB,,, | Performed by: OBSTETRICS & GYNECOLOGY

## 2020-01-03 PROCEDURE — 87661 TRICHOMONAS VAGINALIS AMPLIF: CPT

## 2020-01-03 PROCEDURE — 99213 OFFICE O/P EST LOW 20 MIN: CPT | Mod: PBBFAC | Performed by: OBSTETRICS & GYNECOLOGY

## 2020-01-03 PROCEDURE — 99999 PR PBB SHADOW E&M-EST. PATIENT-LVL III: CPT | Mod: PBBFAC,,, | Performed by: OBSTETRICS & GYNECOLOGY

## 2020-01-03 PROCEDURE — 99213 PR OFFICE/OUTPT VISIT, EST, LEVL III, 20-29 MIN: ICD-10-PCS | Mod: S$PBB,,, | Performed by: OBSTETRICS & GYNECOLOGY

## 2020-01-03 PROCEDURE — 87481 CANDIDA DNA AMP PROBE: CPT | Mod: 59

## 2020-01-03 PROCEDURE — 99999 PR PBB SHADOW E&M-EST. PATIENT-LVL III: ICD-10-PCS | Mod: PBBFAC,,, | Performed by: OBSTETRICS & GYNECOLOGY

## 2020-01-03 NOTE — PROGRESS NOTES
Subjective:       Patient ID: Tammy Lopez is a 19 y.o. female.    Chief Complaint:  Vaginal Discharge (seen ER 20, had injection for STD, and took some pills, does not know what she took, NOT having discharge now) and Vaginal Itching (slight, no discharge, no odor)      History of Present Illness  Patient presents complaining of vaginal irritation.  Patient states she was recently seen in the emergency room and given a shot to treat chlamydia.  Chlamydia culture done in the emergency room was negative.  Patient has had 10 chlamydia cultures in the last year all were negative. She does report a history of having chlamydia sometime in the distant past.  Patient was seen in this office in October with a general culture which was negative at that time.  Patient states that she began having intense internal and external itching after her shot in the emergency room. She notices some discharge which she says she has all the time.  Patient states she has irritation all month long with the exception of her menstrual cycle week    Menstrual History:  OB History        1    Para        Term                AB   1    Living           SAB   1    TAB        Ectopic        Multiple        Live Births   0                Menarche age:  Patient's last menstrual period was 2019 (approximate).         Review of Systems  Review of Systems   Constitutional: Negative for activity change, appetite change, chills, diaphoresis, fatigue, fever and unexpected weight change.   HENT: Negative for congestion, dental problem, drooling, ear discharge, ear pain, facial swelling, hearing loss, mouth sores, nosebleeds, postnasal drip, rhinorrhea, sinus pressure, sneezing, sore throat, tinnitus, trouble swallowing and voice change.    Eyes: Negative for photophobia, pain, discharge, redness, itching and visual disturbance.   Respiratory: Negative for apnea, cough, choking, chest tightness, shortness of breath, wheezing and  stridor.    Cardiovascular: Negative for chest pain, palpitations and leg swelling.   Gastrointestinal: Negative for abdominal distention, abdominal pain, anal bleeding, blood in stool, constipation, diarrhea, nausea, rectal pain and vomiting.   Endocrine: Negative for cold intolerance, heat intolerance, polydipsia, polyphagia and polyuria.   Genitourinary: Negative for decreased urine volume, difficulty urinating, dyspareunia, dysuria, enuresis, flank pain, frequency, genital sores, hematuria, menstrual problem, pelvic pain, urgency, vaginal bleeding, vaginal discharge and vaginal pain.   Musculoskeletal: Negative for arthralgias, back pain, gait problem, joint swelling, myalgias, neck pain and neck stiffness.   Skin: Positive for rash. Negative for color change, pallor and wound.   Allergic/Immunologic: Negative for environmental allergies, food allergies and immunocompromised state.   Neurological: Negative for dizziness, tremors, seizures, syncope, facial asymmetry, speech difficulty, weakness, light-headedness, numbness and headaches.   Hematological: Negative for adenopathy. Does not bruise/bleed easily.   Psychiatric/Behavioral: Negative for agitation, behavioral problems, confusion, decreased concentration, dysphoric mood, hallucinations, self-injury, sleep disturbance and suicidal ideas. The patient is not nervous/anxious and is not hyperactive.            Objective:      Physical Exam   Genitourinary: Rectal exam shows no external hemorrhoid. No labial fusion. There is no rash, tenderness, lesion or injury on the right labia. There is no rash, tenderness, lesion or injury on the left labia. Uterus is not deviated, not enlarged, not fixed and not tender. Cervix exhibits no motion tenderness, no discharge and no friability. Right adnexum displays no mass, no tenderness and no fullness. Left adnexum displays no mass, no tenderness and no fullness. No erythema, tenderness or bleeding in the vagina. No foreign  body in the vagina. No signs of injury around the vagina. No vaginal discharge found.   Nursing note and vitals reviewed.          Assessment:        1. Vaginal irritation                Plan:         Tammy CRUZ was seen today for vaginal discharge and vaginal itching.    Diagnoses and all orders for this visit:    Vaginal irritation  -     Vaginosis Screen by DNA Probe

## 2020-01-05 LAB
BACTERIAL VAGINOSIS DNA: POSITIVE
CANDIDA GLABRATA DNA: NEGATIVE
CANDIDA KRUSEI DNA: NEGATIVE
CANDIDA RRNA VAG QL PROBE: POSITIVE
T VAGINALIS RRNA GENITAL QL PROBE: NEGATIVE

## 2020-01-06 ENCOUNTER — PATIENT MESSAGE (OUTPATIENT)
Dept: OBSTETRICS AND GYNECOLOGY | Facility: CLINIC | Age: 20
End: 2020-01-06

## 2020-01-06 RX ORDER — TERCONAZOLE 8 MG/G
1 CREAM VAGINAL DAILY
Qty: 20 G | Refills: 1 | Status: SHIPPED | OUTPATIENT
Start: 2020-01-06 | End: 2020-02-27 | Stop reason: SDUPTHER

## 2020-01-06 RX ORDER — METRONIDAZOLE 500 MG/1
500 TABLET ORAL EVERY 12 HOURS
Qty: 14 TABLET | Refills: 0 | Status: SHIPPED | OUTPATIENT
Start: 2020-01-06 | End: 2020-01-13

## 2020-01-06 NOTE — TELEPHONE ENCOUNTER
Patient's recent culture was positive for both yeast and bacterial vaginosis.  A prescription for treatment of both has been sent to her pharmacy

## 2020-02-27 RX ORDER — TERCONAZOLE 8 MG/G
1 CREAM VAGINAL DAILY
Qty: 20 G | Refills: 1 | Status: ON HOLD | OUTPATIENT
Start: 2020-02-27 | End: 2020-03-26 | Stop reason: HOSPADM

## 2020-02-28 ENCOUNTER — OFFICE VISIT (OUTPATIENT)
Dept: OBSTETRICS AND GYNECOLOGY | Facility: CLINIC | Age: 20
End: 2020-02-28
Payer: MEDICAID

## 2020-02-28 VITALS
BODY MASS INDEX: 34.06 KG/M2 | DIASTOLIC BLOOD PRESSURE: 67 MMHG | WEIGHT: 211 LBS | HEART RATE: 69 BPM | SYSTOLIC BLOOD PRESSURE: 122 MMHG

## 2020-02-28 DIAGNOSIS — N89.8 VAGINAL DISCHARGE: Primary | ICD-10-CM

## 2020-02-28 PROCEDURE — 99212 OFFICE O/P EST SF 10 MIN: CPT | Mod: PBBFAC | Performed by: OBSTETRICS & GYNECOLOGY

## 2020-02-28 PROCEDURE — 99999 PR PBB SHADOW E&M-EST. PATIENT-LVL II: CPT | Mod: PBBFAC,,, | Performed by: OBSTETRICS & GYNECOLOGY

## 2020-02-28 PROCEDURE — 87481 CANDIDA DNA AMP PROBE: CPT | Mod: 59

## 2020-02-28 PROCEDURE — 99213 PR OFFICE/OUTPT VISIT, EST, LEVL III, 20-29 MIN: ICD-10-PCS | Mod: S$PBB,,, | Performed by: OBSTETRICS & GYNECOLOGY

## 2020-02-28 PROCEDURE — 99213 OFFICE O/P EST LOW 20 MIN: CPT | Mod: S$PBB,,, | Performed by: OBSTETRICS & GYNECOLOGY

## 2020-02-28 PROCEDURE — 99999 PR PBB SHADOW E&M-EST. PATIENT-LVL II: ICD-10-PCS | Mod: PBBFAC,,, | Performed by: OBSTETRICS & GYNECOLOGY

## 2020-02-28 NOTE — PROGRESS NOTES
Subjective:       Patient ID: Tammy Lopez is a 19 y.o. female.    Chief Complaint:  Vaginal Discharge (white, no itching, having odor)      History of Present Illness  Patient presents complaining vaginal discharge. Patient states she has had BV often and feels like it never completely goes away.  Patient states she was last treated about 1 month ago.  She admits to a vaginal discharge and also an odor.  She states the discharge is white.  She denies any itching or irritation.    Menstrual History:  OB History        1    Para        Term                AB   1    Living           SAB   1    TAB        Ectopic        Multiple        Live Births   0                Menarche age:  Patient's last menstrual period was 2020 (approximate).         Review of Systems  Review of Systems   Constitutional: Negative for activity change, appetite change, chills, diaphoresis, fatigue, fever and unexpected weight change.   HENT: Negative for congestion, dental problem, drooling, ear discharge, ear pain, facial swelling, hearing loss, mouth sores, nosebleeds, postnasal drip, rhinorrhea, sinus pressure, sneezing, sore throat, tinnitus, trouble swallowing and voice change.    Eyes: Negative for photophobia, pain, discharge, redness, itching and visual disturbance.   Respiratory: Negative for apnea, cough, choking, chest tightness, shortness of breath, wheezing and stridor.    Cardiovascular: Negative for chest pain, palpitations and leg swelling.   Gastrointestinal: Negative for abdominal distention, abdominal pain, anal bleeding, blood in stool, constipation, diarrhea, nausea, rectal pain and vomiting.   Endocrine: Negative for cold intolerance, heat intolerance, polydipsia, polyphagia and polyuria.   Genitourinary: Positive for vaginal discharge. Negative for decreased urine volume, difficulty urinating, dyspareunia, dysuria, enuresis, flank pain, frequency, genital sores, hematuria, menstrual problem, pelvic  pain, urgency, vaginal bleeding and vaginal pain.   Musculoskeletal: Negative for arthralgias, back pain, gait problem, joint swelling, myalgias, neck pain and neck stiffness.   Skin: Positive for rash. Negative for color change, pallor and wound.   Allergic/Immunologic: Negative for environmental allergies, food allergies and immunocompromised state.   Neurological: Positive for headaches. Negative for dizziness, tremors, seizures, syncope, facial asymmetry, speech difficulty, weakness, light-headedness and numbness.   Hematological: Negative for adenopathy. Does not bruise/bleed easily.   Psychiatric/Behavioral: Negative for agitation, behavioral problems, confusion, decreased concentration, dysphoric mood, hallucinations, self-injury, sleep disturbance and suicidal ideas. The patient is not nervous/anxious and is not hyperactive.            Objective:      Physical Exam   Genitourinary: Rectal exam shows no external hemorrhoid. No labial fusion. There is no rash, tenderness, lesion or injury on the right labia. There is no rash, tenderness, lesion or injury on the left labia. Uterus is not deviated, not enlarged, not fixed and not tender. Cervix exhibits no motion tenderness, no discharge and no friability. Right adnexum displays no mass, no tenderness and no fullness. Left adnexum displays no mass, no tenderness and no fullness. No erythema, tenderness or bleeding in the vagina. No foreign body in the vagina. No signs of injury around the vagina. Vaginal discharge found.   Nursing note and vitals reviewed.          Assessment:        1. Vaginal discharge                Plan:         Tammy CRZU was seen today for vaginal discharge.    Diagnoses and all orders for this visit:    Vaginal discharge  -     Vaginosis Screen by DNA Probe

## 2020-02-29 LAB
BACTERIAL VAGINOSIS DNA: POSITIVE
CANDIDA GLABRATA DNA: NEGATIVE
CANDIDA KRUSEI DNA: NEGATIVE
CANDIDA RRNA VAG QL PROBE: NEGATIVE
T VAGINALIS RRNA GENITAL QL PROBE: NEGATIVE

## 2020-03-02 ENCOUNTER — TELEPHONE (OUTPATIENT)
Dept: OBSTETRICS AND GYNECOLOGY | Facility: CLINIC | Age: 20
End: 2020-03-02

## 2020-03-02 RX ORDER — METRONIDAZOLE 500 MG/1
500 TABLET ORAL EVERY 12 HOURS
Qty: 14 TABLET | Refills: 0 | Status: SHIPPED | OUTPATIENT
Start: 2020-03-02 | End: 2020-03-09

## 2020-03-02 NOTE — TELEPHONE ENCOUNTER
Patient's recent culture was positive for bacterial vaginosis.  A prescription for treatment has been sent to her pharmacy.

## 2020-03-04 ENCOUNTER — HOSPITAL ENCOUNTER (EMERGENCY)
Facility: HOSPITAL | Age: 20
Discharge: HOME | End: 2020-03-04
Attending: SURGERY
Payer: MEDICAID

## 2020-03-04 VITALS
BODY MASS INDEX: 34.55 KG/M2 | RESPIRATION RATE: 18 BRPM | HEART RATE: 79 BPM | DIASTOLIC BLOOD PRESSURE: 88 MMHG | OXYGEN SATURATION: 100 % | WEIGHT: 214.06 LBS | SYSTOLIC BLOOD PRESSURE: 133 MMHG | TEMPERATURE: 99 F

## 2020-03-04 DIAGNOSIS — R51.9 FACIAL PAIN: Primary | ICD-10-CM

## 2020-03-04 PROCEDURE — 99283 EMERGENCY DEPT VISIT LOW MDM: CPT

## 2020-03-04 RX ORDER — NAPROXEN 500 MG/1
250 TABLET ORAL 2 TIMES DAILY WITH MEALS
Qty: 20 TABLET | Refills: 0 | Status: SHIPPED | OUTPATIENT
Start: 2020-03-04 | End: 2020-03-14

## 2020-03-04 NOTE — ED TRIAGE NOTES
19 y.o. female presents to ER   Chief Complaint   Patient presents with    Facial Pain     onset yesterday, pt reports lateral left eye pain. pt has symetrical smile and equal  in triage and is texting on phone.   . No acute distress noted.

## 2020-03-10 NOTE — TELEPHONE ENCOUNTER
Unable to contact patient, spoke to pharmacist with [facility] in [place]. States patient picked up prescription for Flagyl on 3/04/2020.

## 2020-03-11 NOTE — ED PROVIDER NOTES
Encounter Date: 3/4/2020       History     Chief Complaint   Patient presents with    Facial Pain     onset yesterday, pt reports lateral left eye pain. pt has symetrical smile and equal  in triage and is texting on phone.     HPI  Review of patient's allergies indicates:  No Known Allergies  Past Medical History:   Diagnosis Date    ADHD (attention deficit hyperactivity disorder)     Anxiety     Borderline personality disorder     Depression     Fatigue     Headache     History of psychiatric hospitalization     Hx of psychiatric care     Panic disorder     Psychiatric exam requested by authority     Psychiatric problem     PTSD (post-traumatic stress disorder)     Suicide attempt     4-5 by overdose and hanging     Therapy      History reviewed. No pertinent surgical history.  Family History   Problem Relation Age of Onset    Bipolar disorder Maternal Grandmother     Diabetes Maternal Grandmother     Breast cancer Neg Hx     Colon cancer Neg Hx     Ovarian cancer Neg Hx      Social History     Tobacco Use    Smoking status: Former Smoker     Packs/day: 0.00     Last attempt to quit: 2019     Years since quittin.3    Smokeless tobacco: Never Used    Tobacco comment: quit vaping 2019   Substance Use Topics    Alcohol use: Yes     Frequency: Never     Comment: occasionally    Drug use: Yes     Types: Marijuana     Comment: last marijuana use 2020     Review of Systems   Constitutional: Negative for fever.   HENT: Negative for congestion, ear pain, rhinorrhea, sore throat and trouble swallowing.    Eyes: Negative for pain.   Respiratory: Negative for cough, shortness of breath and wheezing.    Cardiovascular: Negative for chest pain, palpitations and leg swelling.   Gastrointestinal: Negative for abdominal pain, constipation, diarrhea and nausea.   Genitourinary: Negative for difficulty urinating, dysuria, flank pain, frequency, hematuria and urgency.   Musculoskeletal:  Negative for arthralgias, back pain, myalgias and neck pain.   Skin: Negative for rash and wound.   Neurological: Negative for speech difficulty, weakness and headaches.   Hematological: Does not bruise/bleed easily.       Physical Exam     Initial Vitals [03/04/20 1325]   BP Pulse Resp Temp SpO2   133/88 79 18 98.7 °F (37.1 °C) 100 %      MAP       --         Physical Exam    Nursing note and vitals reviewed.  Constitutional: No distress.   HENT:   Head: Normocephalic and atraumatic.   Right Ear: External ear normal.   Left Ear: External ear normal.   Nose: Nose normal.   Mouth/Throat: Oropharynx is clear and moist.   Eyes: Conjunctivae and EOM are normal. Pupils are equal, round, and reactive to light.   Neck: Neck supple.   Cardiovascular: Normal rate, regular rhythm, normal heart sounds and intact distal pulses.   Pulmonary/Chest: Breath sounds normal.   Abdominal: Soft. Bowel sounds are normal. There is no tenderness.   Musculoskeletal: Normal range of motion.   Neurological: She is alert and oriented to person, place, and time. She has normal strength. No cranial nerve deficit or sensory deficit.   Skin: Skin is warm and dry.         ED Course   Procedures  Labs Reviewed - No data to display       Imaging Results    None                                          Clinical Impression:       ICD-10-CM ICD-9-CM   1. Facial pain R51 784.0         Disposition:   Disposition: Discharged  Condition: Stable     ED Disposition Condition    Discharge Stable        ED Prescriptions     Medication Sig Dispense Start Date End Date Auth. Provider    naproxen (NAPROSYN) 500 MG tablet Take 0.5 tablets (250 mg total) by mouth 2 (two) times daily with meals. for 20 doses 20 tablet 3/4/2020 3/14/2020 Miki Romero Jr., MD        Follow-up Information    None                                    Miki Romero Jr., MD  03/11/20 0631       Miki Romero Jr., MD  04/08/20 0839

## 2020-03-21 ENCOUNTER — HOSPITAL ENCOUNTER (EMERGENCY)
Facility: HOSPITAL | Age: 20
Discharge: PSYCHIATRIC OR SUBSTANCE USE FACILITY | End: 2020-03-22
Attending: SURGERY
Payer: MEDICAID

## 2020-03-21 VITALS
TEMPERATURE: 97 F | BODY MASS INDEX: 34.55 KG/M2 | OXYGEN SATURATION: 99 % | RESPIRATION RATE: 18 BRPM | SYSTOLIC BLOOD PRESSURE: 108 MMHG | WEIGHT: 214.06 LBS | DIASTOLIC BLOOD PRESSURE: 75 MMHG | HEART RATE: 78 BPM

## 2020-03-21 DIAGNOSIS — F41.0 PANIC ATTACK: ICD-10-CM

## 2020-03-21 DIAGNOSIS — F41.9 ANXIETY: Primary | ICD-10-CM

## 2020-03-21 LAB
ALBUMIN SERPL BCP-MCNC: 4 G/DL (ref 3.5–5.2)
ALP SERPL-CCNC: 85 U/L (ref 55–135)
ALT SERPL W/O P-5'-P-CCNC: 11 U/L (ref 10–44)
AMPHET+METHAMPHET UR QL: NEGATIVE
ANION GAP SERPL CALC-SCNC: 9 MMOL/L (ref 8–16)
APAP SERPL-MCNC: <3 UG/ML (ref 10–20)
AST SERPL-CCNC: 13 U/L (ref 10–40)
B-HCG UR QL: NEGATIVE
BARBITURATES UR QL SCN>200 NG/ML: NEGATIVE
BASOPHILS # BLD AUTO: 0.02 K/UL (ref 0–0.2)
BASOPHILS NFR BLD: 0.5 % (ref 0–1.9)
BENZODIAZ UR QL SCN>200 NG/ML: NEGATIVE
BILIRUB SERPL-MCNC: 0.3 MG/DL (ref 0.1–1)
BILIRUB UR QL STRIP: NEGATIVE
BUN SERPL-MCNC: 8 MG/DL (ref 6–20)
BZE UR QL SCN: NEGATIVE
CALCIUM SERPL-MCNC: 9.1 MG/DL (ref 8.7–10.5)
CANNABINOIDS UR QL SCN: NEGATIVE
CHLORIDE SERPL-SCNC: 108 MMOL/L (ref 95–110)
CK MB SERPL-MCNC: 0.4 NG/ML (ref 0.1–6.5)
CK MB SERPL-RTO: 0.5 % (ref 0–5)
CK SERPL-CCNC: 80 U/L (ref 20–180)
CK SERPL-CCNC: 80 U/L (ref 20–180)
CLARITY UR: CLEAR
CO2 SERPL-SCNC: 22 MMOL/L (ref 23–29)
COLOR UR: YELLOW
CREAT SERPL-MCNC: 0.7 MG/DL (ref 0.5–1.4)
CREAT UR-MCNC: 38.3 MG/DL (ref 15–325)
DIFFERENTIAL METHOD: ABNORMAL
EOSINOPHIL # BLD AUTO: 0 K/UL (ref 0–0.5)
EOSINOPHIL NFR BLD: 1 % (ref 0–8)
ERYTHROCYTE [DISTWIDTH] IN BLOOD BY AUTOMATED COUNT: 14.6 % (ref 11.5–14.5)
EST. GFR  (AFRICAN AMERICAN): >60 ML/MIN/1.73 M^2
EST. GFR  (NON AFRICAN AMERICAN): >60 ML/MIN/1.73 M^2
ETHANOL SERPL-MCNC: <10 MG/DL
GLUCOSE SERPL-MCNC: 80 MG/DL (ref 70–110)
GLUCOSE UR QL STRIP: NEGATIVE
HCT VFR BLD AUTO: 35.8 % (ref 37–48.5)
HGB BLD-MCNC: 10.9 G/DL (ref 12–16)
HGB UR QL STRIP: ABNORMAL
IMM GRANULOCYTES # BLD AUTO: 0.01 K/UL (ref 0–0.04)
IMM GRANULOCYTES NFR BLD AUTO: 0.3 % (ref 0–0.5)
KETONES UR QL STRIP: NEGATIVE
LEUKOCYTE ESTERASE UR QL STRIP: NEGATIVE
LYMPHOCYTES # BLD AUTO: 1.2 K/UL (ref 1–4.8)
LYMPHOCYTES NFR BLD: 30.3 % (ref 18–48)
MCH RBC QN AUTO: 25.8 PG (ref 27–31)
MCHC RBC AUTO-ENTMCNC: 30.4 G/DL (ref 32–36)
MCV RBC AUTO: 85 FL (ref 82–98)
METHADONE UR QL SCN>300 NG/ML: NEGATIVE
MONOCYTES # BLD AUTO: 0.3 K/UL (ref 0.3–1)
MONOCYTES NFR BLD: 8.5 % (ref 4–15)
NEUTROPHILS # BLD AUTO: 2.3 K/UL (ref 1.8–7.7)
NEUTROPHILS NFR BLD: 59.4 % (ref 38–73)
NITRITE UR QL STRIP: NEGATIVE
NRBC BLD-RTO: 0 /100 WBC
OPIATES UR QL SCN: NEGATIVE
PCP UR QL SCN>25 NG/ML: NEGATIVE
PH UR STRIP: 6 [PH] (ref 5–8)
PLATELET # BLD AUTO: 298 K/UL (ref 150–350)
PMV BLD AUTO: 11 FL (ref 9.2–12.9)
POTASSIUM SERPL-SCNC: 3.5 MMOL/L (ref 3.5–5.1)
PROT SERPL-MCNC: 8.1 G/DL (ref 6–8.4)
PROT UR QL STRIP: NEGATIVE
RBC # BLD AUTO: 4.22 M/UL (ref 4–5.4)
SALICYLATES SERPL-MCNC: <5 MG/DL (ref 15–30)
SODIUM SERPL-SCNC: 139 MMOL/L (ref 136–145)
SP GR UR STRIP: 1.01 (ref 1–1.03)
T4 FREE SERPL-MCNC: 0.91 NG/DL (ref 0.71–1.51)
TOXICOLOGY INFORMATION: NORMAL
TROPONIN I SERPL DL<=0.01 NG/ML-MCNC: <0.006 NG/ML (ref 0–0.03)
TSH SERPL DL<=0.005 MIU/L-ACNC: 0.47 UIU/ML (ref 0.4–4)
URN SPEC COLLECT METH UR: ABNORMAL
UROBILINOGEN UR STRIP-ACNC: NEGATIVE EU/DL
WBC # BLD AUTO: 3.89 K/UL (ref 3.9–12.7)

## 2020-03-21 PROCEDURE — 82306 VITAMIN D 25 HYDROXY: CPT

## 2020-03-21 PROCEDURE — 99285 EMERGENCY DEPT VISIT HI MDM: CPT | Mod: 25

## 2020-03-21 PROCEDURE — 84484 ASSAY OF TROPONIN QUANT: CPT

## 2020-03-21 PROCEDURE — 80307 DRUG TEST PRSMV CHEM ANLYZR: CPT

## 2020-03-21 PROCEDURE — 82746 ASSAY OF FOLIC ACID SERUM: CPT

## 2020-03-21 PROCEDURE — 96372 THER/PROPH/DIAG INJ SC/IM: CPT

## 2020-03-21 PROCEDURE — 84439 ASSAY OF FREE THYROXINE: CPT

## 2020-03-21 PROCEDURE — 93010 EKG 12-LEAD: ICD-10-PCS | Mod: ,,, | Performed by: INTERNAL MEDICINE

## 2020-03-21 PROCEDURE — 80329 ANALGESICS NON-OPIOID 1 OR 2: CPT

## 2020-03-21 PROCEDURE — 93005 ELECTROCARDIOGRAM TRACING: CPT

## 2020-03-21 PROCEDURE — 81003 URINALYSIS AUTO W/O SCOPE: CPT | Mod: 59

## 2020-03-21 PROCEDURE — 93005 ELECTROCARDIOGRAM TRACING: CPT | Performed by: INTERNAL MEDICINE

## 2020-03-21 PROCEDURE — 36415 COLL VENOUS BLD VENIPUNCTURE: CPT

## 2020-03-21 PROCEDURE — 80053 COMPREHEN METABOLIC PANEL: CPT

## 2020-03-21 PROCEDURE — 85025 COMPLETE CBC W/AUTO DIFF WBC: CPT

## 2020-03-21 PROCEDURE — 84481 FREE ASSAY (FT-3): CPT

## 2020-03-21 PROCEDURE — 82553 CREATINE MB FRACTION: CPT

## 2020-03-21 PROCEDURE — 80320 DRUG SCREEN QUANTALCOHOLS: CPT

## 2020-03-21 PROCEDURE — 93010 ELECTROCARDIOGRAM REPORT: CPT | Mod: ,,, | Performed by: INTERNAL MEDICINE

## 2020-03-21 PROCEDURE — P9612 CATHETERIZE FOR URINE SPEC: HCPCS

## 2020-03-21 PROCEDURE — 84443 ASSAY THYROID STIM HORMONE: CPT

## 2020-03-21 PROCEDURE — 82607 VITAMIN B-12: CPT

## 2020-03-21 PROCEDURE — 81025 URINE PREGNANCY TEST: CPT

## 2020-03-21 PROCEDURE — 63600175 PHARM REV CODE 636 W HCPCS: Performed by: SURGERY

## 2020-03-21 PROCEDURE — 82550 ASSAY OF CK (CPK): CPT

## 2020-03-21 RX ORDER — LORAZEPAM 2 MG/ML
2 INJECTION INTRAMUSCULAR EVERY 4 HOURS PRN
Status: DISCONTINUED | OUTPATIENT
Start: 2020-03-21 | End: 2020-03-22 | Stop reason: HOSPADM

## 2020-03-21 RX ORDER — HALOPERIDOL 5 MG/ML
5 INJECTION INTRAMUSCULAR EVERY 4 HOURS PRN
Status: DISCONTINUED | OUTPATIENT
Start: 2020-03-21 | End: 2020-03-22 | Stop reason: HOSPADM

## 2020-03-21 RX ORDER — LORAZEPAM 2 MG/ML
1 INJECTION INTRAMUSCULAR
Status: COMPLETED | OUTPATIENT
Start: 2020-03-21 | End: 2020-03-21

## 2020-03-21 RX ORDER — DIPHENHYDRAMINE HYDROCHLORIDE 50 MG/ML
50 INJECTION INTRAMUSCULAR; INTRAVENOUS EVERY 4 HOURS PRN
Status: DISCONTINUED | OUTPATIENT
Start: 2020-03-21 | End: 2020-03-22 | Stop reason: HOSPADM

## 2020-03-21 RX ADMIN — LORAZEPAM 1 MG: 2 INJECTION INTRAMUSCULAR; INTRAVENOUS at 04:03

## 2020-03-21 NOTE — PROVIDER PROGRESS NOTES - EMERGENCY DEPT.
Face to Face Restraint Note- [facility]         /71  Pulse 88   Temp 98 °F (36.7 °C) (Oral)   Resp 18     Time: 5:33 PM    Type: Secure padded 4 point restraints    Patient's immediate situation: Patient was violent and unable to be redirected    Reaction to intervention: Patient continues to be agitated and angry, thrashing    Medications/behavioral condition: Patient will hurt himself given this condition    Restraint status: Continue restraints until patient acts appropriately        Graham Goff M.D. 5:33 PM 3/21/2020

## 2020-03-21 NOTE — ED NOTES
The patient is sleeping, alert when woken, aware of environment. Airway is open and patent, respirations are spontaneous, normal respiratory effort and rate noted, full ROM in all extremities, no distress noted. Bed in low, locked position, SR x2, pt able to change position independently. Will continue to monitor.

## 2020-03-21 NOTE — ED TRIAGE NOTES
19 y.o. female presents to ER ED 03 /ED 03A   Chief Complaint   Patient presents with    Panic Attack   pt arrived via [facility] c/o anxiety & panic attack. Pt reports hx of anxiety. No acute distress noted.

## 2020-03-21 NOTE — ED NOTES
Pt refusing to stay in bed, uncooperative with staff & security, trying to leave, combative, MD notified.

## 2020-03-21 NOTE — ED NOTES
The patient is awake, alert. Airway is open and patent, respirations are spontaneous, normal respiratory effort and rate noted, full ROM in all extremities, no distress noted. No change from previous assessment, bed in low, locked position, SR x2. Sitter present at bedside. Will continue to monitor.

## 2020-03-21 NOTE — ED PROVIDER NOTES
2Encounter Date: 3/21/2020       History     Chief Complaint   Patient presents with    Panic Attack     19 year old female presents with anxiety attack. She reports that she was looking on her phone when she felt like something got stuck in her throat which caused SOB. She had not recently eaten or swallowed anything.   She reports that she has a history of anxiety and is suppose to take medication, but reports noncompliance. Reports that she feels like she was having an anxiety attack, but is now progressively feeling better. Reports that she no longer feels like something is stuck in her throat and no longer feels SOB.   Denies drug use. Denies risk of pregnancy. Denies homicidal or suicidal ideations.     The history is provided by the patient.     Review of patient's allergies indicates:  No Known Allergies  Past Medical History:   Diagnosis Date    ADHD (attention deficit hyperactivity disorder)     Anxiety     Borderline personality disorder     Depression     Fatigue     Headache     History of psychiatric hospitalization     Hx of psychiatric care     Panic disorder     Psychiatric exam requested by authority     Psychiatric problem     PTSD (post-traumatic stress disorder)     Suicide attempt     4-5 by overdose and hanging     Therapy      History reviewed. No pertinent surgical history.  Family History   Problem Relation Age of Onset    Bipolar disorder Maternal Grandmother     Diabetes Maternal Grandmother     Breast cancer Neg Hx     Colon cancer Neg Hx     Ovarian cancer Neg Hx      Social History     Tobacco Use    Smoking status: Former Smoker     Packs/day: 0.00     Last attempt to quit: 2019     Years since quittin.3    Smokeless tobacco: Never Used    Tobacco comment: quit vaping 2019   Substance Use Topics    Alcohol use: Yes     Frequency: Never     Comment: occasionally    Drug use: Yes     Types: Marijuana     Comment: last marijuana use 2020     Review  of Systems   Constitutional: Negative for fever.   HENT: Positive for trouble swallowing (Related to anxiety). Negative for congestion, ear pain, rhinorrhea and sore throat.    Eyes: Negative for pain and redness.   Respiratory: Positive for shortness of breath ( related to anxiety). Negative for cough.    Cardiovascular: Negative for chest pain.   Gastrointestinal: Negative for abdominal pain.   Genitourinary: Negative for difficulty urinating and dysuria.   Musculoskeletal: Negative for arthralgias, back pain, myalgias and neck pain.   Skin: Negative for rash and wound.   Neurological: Negative for seizures, weakness and headaches.   Psychiatric/Behavioral: Negative for hallucinations, self-injury and suicidal ideas. The patient is nervous/anxious.        Physical Exam     Initial Vitals [03/21/20 1526]   BP Pulse Resp Temp SpO2   123/71 88 18 98 °F (36.7 °C) 99 %      MAP       --         Physical Exam    Nursing note and vitals reviewed.  Constitutional: No distress.   HENT:   Head: Normocephalic and atraumatic.   Right Ear: External ear normal.   Left Ear: External ear normal.   Nose: Nose normal.   Mouth/Throat: Oropharynx is clear and moist and mucous membranes are normal.   Eyes: Conjunctivae, EOM and lids are normal. Pupils are equal, round, and reactive to light.   Neck: Neck supple.   Cardiovascular: Normal rate, regular rhythm, S1 normal, S2 normal, normal heart sounds and intact distal pulses.   Pulmonary/Chest: Effort normal and breath sounds normal. No respiratory distress.   Abdominal: Soft. Bowel sounds are normal. There is no tenderness.   Musculoskeletal: Normal range of motion.   Neurological: She is alert and oriented to person, place, and time. She has normal strength. GCS eye subscore is 4. GCS verbal subscore is 5. GCS motor subscore is 6.   Skin: Skin is warm and dry. Capillary refill takes less than 2 seconds. No rash noted.   Psychiatric: Thought content normal. Her mood appears anxious.  She is withdrawn.         ED Course   Procedures  Labs Reviewed   CBC W/ AUTO DIFFERENTIAL - Abnormal; Notable for the following components:       Result Value    WBC 3.89 (*)     Hemoglobin 10.9 (*)     Hematocrit 35.8 (*)     Mean Corpuscular Hemoglobin 25.8 (*)     Mean Corpuscular Hemoglobin Conc 30.4 (*)     RDW 14.6 (*)     All other components within normal limits   COMPREHENSIVE METABOLIC PANEL - Abnormal; Notable for the following components:    CO2 22 (*)     All other components within normal limits   URINALYSIS, REFLEX TO URINE CULTURE - Abnormal; Notable for the following components:    Occult Blood UA Trace (*)     All other components within normal limits    Narrative:     Preferred Collection Type->Urine, Clean Catch   CK-MB   CK   TROPONIN I   DRUG SCREEN PANEL, URINE EMERGENCY   PREGNANCY TEST, URINE RAPID          Imaging Results          X-Ray Chest 1 View (In process)                                       Patient with continued anxiety and agitation today in the emergency room  Patient has been over using melatonin for anxiety issues this past week  Patient has a past history of suicidal ideation, previous [facility] admit noted  Patient does not appear to be suicidal today with severe agitation in the ER  After several attempts to redirect and counseled the patient, very unsuccessful  Patient was placed under pec for full psychiatric evaluation at this time           Clinical Impression:       ICD-10-CM ICD-9-CM   1. Anxiety F41.9 300.00   2. Panic attack F41.0 300.01         Disposition:   Disposition: Transferred  Condition: Stable     ED Disposition Condition    Transfer to Psych Facility         ED Prescriptions     None        Follow-up Information    None                                    Graham Goff MD  03/21/20 6181

## 2020-03-22 PROBLEM — Z13.9 ENCOUNTER FOR MEDICAL SCREENING EXAMINATION: Status: ACTIVE | Noted: 2020-03-22

## 2020-03-22 PROBLEM — F31.9 BIPOLAR AFFECTIVE: Status: ACTIVE | Noted: 2020-03-22

## 2020-03-22 LAB
25(OH)D3+25(OH)D2 SERPL-MCNC: 9 NG/ML (ref 30–96)
FOLATE SERPL-MCNC: 10.4 NG/ML (ref 4–24)
T3FREE SERPL-MCNC: 2.8 PG/ML (ref 2.3–4.2)
VIT B12 SERPL-MCNC: 401 PG/ML (ref 210–950)

## 2020-03-22 PROCEDURE — 63600175 PHARM REV CODE 636 W HCPCS: Performed by: INTERNAL MEDICINE

## 2020-03-22 PROCEDURE — 63600175 PHARM REV CODE 636 W HCPCS: Performed by: SURGERY

## 2020-03-22 RX ORDER — ONDANSETRON 2 MG/ML
4 INJECTION INTRAMUSCULAR; INTRAVENOUS
Status: COMPLETED | OUTPATIENT
Start: 2020-03-22 | End: 2020-03-22

## 2020-03-22 RX ADMIN — DIPHENHYDRAMINE HYDROCHLORIDE 50 MG: 50 INJECTION INTRAMUSCULAR; INTRAVENOUS at 02:03

## 2020-03-22 RX ADMIN — LORAZEPAM 2 MG: 2 INJECTION INTRAMUSCULAR; INTRAVENOUS at 02:03

## 2020-03-22 RX ADMIN — ONDANSETRON 4 MG: 2 INJECTION INTRAMUSCULAR; INTRAVENOUS at 01:03

## 2020-03-22 RX ADMIN — HALOPERIDOL LACTATE 5 MG: 5 INJECTION, SOLUTION INTRAMUSCULAR at 02:03

## 2020-03-22 NOTE — ED NOTES
Patient started to act out when getting  on [facility] stretcher. Patient became aggressive and tried to get off of stretcher. PRN agitation medications given at this time. [facility] continue preparing patient for transport. [facility]  Called at [phone] to update staff that patient was given PRN agitation medications. Staff verbalized understanding.

## 2020-03-22 NOTE — ED NOTES
Gave report to staff at at [facility] on patient. [facility] here to transport patient.

## 2020-03-22 NOTE — ED NOTES
Patient did not vomit at this time. Only dry heaves. Patient will not verbalize words to staff just nods her head yes or no to questions.

## 2020-03-29 ENCOUNTER — HOSPITAL ENCOUNTER (EMERGENCY)
Facility: HOSPITAL | Age: 20
Discharge: HOME | End: 2020-03-29
Attending: SURGERY
Payer: MEDICAID

## 2020-03-29 VITALS
RESPIRATION RATE: 16 BRPM | TEMPERATURE: 98 F | HEART RATE: 88 BPM | DIASTOLIC BLOOD PRESSURE: 76 MMHG | BODY MASS INDEX: 33.83 KG/M2 | SYSTOLIC BLOOD PRESSURE: 118 MMHG | OXYGEN SATURATION: 98 % | WEIGHT: 216 LBS

## 2020-03-29 DIAGNOSIS — J02.9 SORE THROAT: Primary | ICD-10-CM

## 2020-03-29 PROCEDURE — 99283 EMERGENCY DEPT VISIT LOW MDM: CPT

## 2020-03-29 RX ORDER — AMOXICILLIN 875 MG/1
875 TABLET, FILM COATED ORAL 2 TIMES DAILY
Qty: 14 TABLET | Refills: 0 | Status: SHIPPED | OUTPATIENT
Start: 2020-03-29 | End: 2020-04-05

## 2020-03-29 NOTE — ED PROVIDER NOTES
[facility]                                                 Chief Complaint  19 y.o. female with Sore Throat    History of Present Illness  Tammy Lopez presents to the emergency room with sore throat today  Patient with sore throat today with mild pharyngitis on evaluation this p.m.  Patient has no tonsillitis, patient has no exudate or peritonsillar abscess   No hot potato voice, patient has normal swallowing, no stridor or dysphagia  ROS in the ER today shows no signs or symptoms suspicious for coronavirus     The history is provided by the patient   device was not used during this ER visit  Medical history: ADHD and depression  History reviewed. No pertinent surgical history.   No Known Allergies   History reviewed. No pertinent family history.    I have reviewed all of this patient's past medical, surgical, family, and social   histories as well as active allergies and medications documented in the  electronic medical record    Review of Systems and Physical Exam      Review of Systems  -- Constitution - no fever, denies fatigue, no weakness, no chills  -- Eyes - no tearing or redness, no visual disturbance  -- Ear, Nose - no tinnitus or earache, no nasal congestion or discharge  -- Mouth,Throat - sore throat, no toothache, normal voice, normal swallowing  -- Respiratory - denies cough and congestion, no shortness of breath, no DELUCA  -- Cardiovascular - denies chest pain, no palpitations, denies claudication  -- Gastrointestinal - denies abdominal pain, nausea, vomiting, or diarrhea  -- Genitourinary - no dysuria, no hematuria, no flank pain, no bladder pain  -- Musculoskeletal - denies back pain, negative for trauma or injury  -- Neurological - no headache, denies weakness or seizure; no LOC  -- Skin - denies pallor, rash, or changes in skin. no hives or welts noted    /76  Pulse 88   Temp 97.7 °F  Resp 16   Wt 98 kg (216 lb)    Physical Exam  -- Nursing  note and vitals reviewed  -- Constitutional: Appears well-developed and well-nourished  -- Head: Atraumatic. Normocephalic. No obvious abnormality  -- Eyes: Pupils are equal and reactive to light. Normal conjunctiva and lids  -- Nose: Nose normal in appearance, nares grossly normal. No discharge  -- Throat: mild posterior oropharnyx erythema with no exudate or signs   -- Ears: External ears and TM normal bilaterally. Normal hearing and no drainage  -- Neck: Normal range of motion. Neck supple. No masses, trachea midline  -- Cardiac: Normal rate, regular rhythm and normal heart sounds  -- Pulmonary: Normal respiratory effort, breath sounds clear to auscultation  -- Abdominal: Soft, no tenderness. Normal bowel sounds. Normal liver edge  -- Musculoskeletal: Normal range of motion, no effusions. Joints stable   -- Neurological: No focal deficits. Showed good interaction with staff  -- Skin: Warm and dry. No evidence of rash or cellulitis    Emergency Room Course      Coronavirus Testing Criteria  -- Does the patient have symptoms and fever? No  -- Did the patient have a negative flu test: No  -- Healthcare worker in contact with COVID? No  -- Pregnant woman? No  -- Immunocompromise patient? No  -- Lives in communal setting? No  -- Infant less than 10 weeks of age? No  -- Did not meet any testing criteria set forth by [facility] guidelines     Diagnosis  -- The encounter diagnosis was Sore throat.    Disposition and Plan  -- Disposition: home  -- Condition: stable  -- Follow-up: Patient to follow up with Sherice Ross NP in 1-2 days.  -- I advised the patient that we have found no life threatening condition today  -- At this time, I believe the patient is clinically stable for discharge.   -- The patient acknowledges that close follow up with a MD is required   -- Patient agrees to comply with all instruction and direction given in the ER    This note is dictated on M*Modal word recognition program.  There are  word recognition mistakes that are occasionally missed on review.          Graham Goff MD  03/29/20 6455

## 2020-04-01 ENCOUNTER — TELEPHONE (OUTPATIENT)
Dept: OBSTETRICS AND GYNECOLOGY | Facility: CLINIC | Age: 20
End: 2020-04-01

## 2020-04-01 NOTE — TELEPHONE ENCOUNTER
Pt states missed her cycle, UPT says invalid. Instructed patient to take another test, explained how to take a UPT. Pt voiced understanding and will call clinic with results of UPT.

## 2020-04-06 ENCOUNTER — TELEPHONE (OUTPATIENT)
Dept: OBSTETRICS AND GYNECOLOGY | Facility: CLINIC | Age: 20
End: 2020-04-06

## 2020-04-06 NOTE — TELEPHONE ENCOUNTER
Patient reported a neg home preg test states her cycle is late agrees with monitoring it and will let us know if she needs somthing

## 2020-04-06 NOTE — TELEPHONE ENCOUNTER
----- Message from Loni Christopher sent at 4/6/2020  1:11 PM CDT -----  Contact: self  Tammy Lopez  MRN: [id number]  Home Phone      [phone]  Work Phone      [phone]  Mobile          [phone]    Patient Care Team:  Sherice Ross NP as PCP - General (Family Medicine)  OB? No  What phone number can you be reached at? [phone]  Message: Pt called to report home preg test came back negative.

## 2020-06-21 ENCOUNTER — HOSPITAL ENCOUNTER (EMERGENCY)
Facility: HOSPITAL | Age: 20
Discharge: HOME | End: 2020-06-21
Attending: SURGERY
Payer: MEDICAID

## 2020-06-21 VITALS
OXYGEN SATURATION: 100 % | BODY MASS INDEX: 32.47 KG/M2 | SYSTOLIC BLOOD PRESSURE: 142 MMHG | TEMPERATURE: 98 F | WEIGHT: 207.31 LBS | DIASTOLIC BLOOD PRESSURE: 94 MMHG | HEART RATE: 95 BPM | RESPIRATION RATE: 18 BRPM

## 2020-06-21 DIAGNOSIS — Z32.01 POSITIVE PREGNANCY TEST: Primary | ICD-10-CM

## 2020-06-21 LAB — B-HCG UR QL: POSITIVE

## 2020-06-21 PROCEDURE — 99282 EMERGENCY DEPT VISIT SF MDM: CPT

## 2020-06-21 PROCEDURE — 81025 URINE PREGNANCY TEST: CPT

## 2020-06-21 NOTE — ED TRIAGE NOTES
Patient presents to the ER wanting a pregnancy test.  She took a home test which read positive.  Patient's LMP 5/13/20

## 2020-06-21 NOTE — ED PROVIDER NOTES
[facility]                                                 Chief Complaint  20 y.o. female with Possible Pregnancy      History of Present Illness  Tammy Lopez presents to the emergency room for pregnancy test today  Patient states she had a positive pregnancy test at home, no symptoms now  Patient states she needs a approved for pregnancy to get benefits/insurance  Denies any nausea or vomiting, denies any abdominal pain on ER interview  Patient denies any spotting contractions or leakage of fluid on ER history today  Patient simply states she wants confirmation of pregnancy, has no symptoms    The history is provided by the patient   device was not used during this ER visit  Medical history: ADHD and depression  History reviewed. No pertinent surgical history.   No Known Allergies   History reviewed. No pertinent family history.    I have reviewed all of this patient's past medical, surgical, family, and social   histories as well as active allergies and medications documented in the  electronic medical record    Review of Systems and Physical Exam      Review of Systems  -- Constitution - no fever, denies fatigue, no weakness, no chills  -- Eyes - no tearing or redness, no visual disturbance  -- Ear, Nose - no tinnitus or earache, no nasal congestion or discharge  -- Mouth,Throat - no sore throat, no toothache, normal voice, normal swallowing  -- Respiratory - denies cough and congestion, no shortness of breath, no DELUCA  -- Cardiovascular - denies chest pain, no palpitations, denies claudication  -- Gastrointestinal - denies abdominal pain, nausea, vomiting, or diarrhea  -- Genitourinary - no dysuria, denies flank pain, no hematuria, no STD risk  -- Musculoskeletal - denies back pain, negative for trauma or injury  -- Neurological - no headache, denies weakness or seizure; no LOC  -- Skin - denies pallor, rash, or changes in skin. no hives or welts noted    Vital  Signs  Her blood pressure is 142/94 and her pulse is 95.   Her respiration is 18 and oxygen saturation is 100%.     Physical Exam  -- Nursing note and vitals reviewed  -- Constitutional: Appears well-developed and well-nourished  -- Head: Atraumatic. Normocephalic. No obvious abnormality  -- Eyes: Pupils are equal and reactive to light. Normal conjunctiva and lids  -- Cardiac: Normal rate, regular rhythm and normal heart sounds  -- Pulmonary: Normal respiratory effort, breath sounds clear to auscultation  -- Abdominal: Soft, no tenderness. Normal bowel sounds. Normal liver edge  -- Musculoskeletal: Normal range of motion, no effusions. Joints stable   -- Neurological: No focal deficits. Showed good interaction with staff  -- Vascular: Posterior tibial, dorsalis pedis and radial pulses 2+ bilaterally      Emergency Room Course      Treatment and Evaluation  -- The urine pregnancy test today was positive; patient informed of the results    ED Physician Management  -- Diagnosis management comments: 20 y.o. female with positive pregnancy test  -- patient instructed to follow up with OBGYN in the next 48 hours in OB Clinic  -- patient also instructed to start a prenatal vitamin with good dietary practices  -- patient states she will see the OBGYN clinic in recent on Monday morning  -- return to ER with any actual symptoms, reports no symptoms during ER visit    Diagnosis  [Z32.01] Positive pregnancy test (Primary)    Disposition and Plan  -- Disposition: home  -- Condition: stable  -- Follow-up: Patient to follow up with OBGYN in 1-2 days.  -- I advised the patient that we have found no life threatening condition today  -- At this time, I believe the patient is clinically stable for discharge.   -- The patient acknowledges that close follow up with a MD is required   -- Patient agrees to comply with all instruction and direction given in the ER    This note is dictated on M*Modal word recognition program.  There are word  recognition mistakes that are occasionally missed on review.         Graham Goff MD  06/21/20 2240

## 2020-06-22 PROBLEM — Z13.9 ENCOUNTER FOR MEDICAL SCREENING EXAMINATION: Status: RESOLVED | Noted: 2020-03-22 | Resolved: 2020-06-22

## 2020-06-24 ENCOUNTER — LAB VISIT (OUTPATIENT)
Dept: LAB | Facility: HOSPITAL | Age: 20
End: 2020-06-24
Attending: OBSTETRICS & GYNECOLOGY
Payer: MEDICAID

## 2020-06-24 ENCOUNTER — OFFICE VISIT (OUTPATIENT)
Dept: OBSTETRICS AND GYNECOLOGY | Facility: CLINIC | Age: 20
End: 2020-06-24
Payer: MEDICAID

## 2020-06-24 ENCOUNTER — TELEPHONE (OUTPATIENT)
Dept: OBSTETRICS AND GYNECOLOGY | Facility: CLINIC | Age: 20
End: 2020-06-24

## 2020-06-24 VITALS
DIASTOLIC BLOOD PRESSURE: 96 MMHG | BODY MASS INDEX: 32.62 KG/M2 | HEART RATE: 85 BPM | WEIGHT: 207.81 LBS | HEIGHT: 67 IN | RESPIRATION RATE: 16 BRPM | SYSTOLIC BLOOD PRESSURE: 124 MMHG

## 2020-06-24 DIAGNOSIS — O20.9 VAGINAL BLEEDING AFFECTING EARLY PREGNANCY: Primary | ICD-10-CM

## 2020-06-24 DIAGNOSIS — O20.9 VAGINAL BLEEDING AFFECTING EARLY PREGNANCY: ICD-10-CM

## 2020-06-24 LAB — HCG INTACT+B SERPL-ACNC: 14 MIU/ML

## 2020-06-24 PROCEDURE — 84702 CHORIONIC GONADOTROPIN TEST: CPT

## 2020-06-24 PROCEDURE — 99999 PR PBB SHADOW E&M-EST. PATIENT-LVL III: ICD-10-PCS | Mod: PBBFAC,,, | Performed by: OBSTETRICS & GYNECOLOGY

## 2020-06-24 PROCEDURE — 99999 PR PBB SHADOW E&M-EST. PATIENT-LVL III: CPT | Mod: PBBFAC,,, | Performed by: OBSTETRICS & GYNECOLOGY

## 2020-06-24 PROCEDURE — 99213 OFFICE O/P EST LOW 20 MIN: CPT | Mod: S$PBB,TH,, | Performed by: OBSTETRICS & GYNECOLOGY

## 2020-06-24 PROCEDURE — 99213 OFFICE O/P EST LOW 20 MIN: CPT | Mod: PBBFAC,TH | Performed by: OBSTETRICS & GYNECOLOGY

## 2020-06-24 PROCEDURE — 99213 PR OFFICE/OUTPT VISIT, EST, LEVL III, 20-29 MIN: ICD-10-PCS | Mod: S$PBB,TH,, | Performed by: OBSTETRICS & GYNECOLOGY

## 2020-06-24 PROCEDURE — 36415 COLL VENOUS BLD VENIPUNCTURE: CPT

## 2020-06-24 NOTE — PROGRESS NOTES
Subjective:    Patient ID: Tammy Lopez is a 20 y.o. y.o. female.     Chief Complaint:   Chief Complaint   Patient presents with    Follow-up     er follow up; pos pregnancy test at er        History of Present Illness:  Tammy CRUZ presents today for follow up from ED. She presented for a confirmation of pregnancy. She reports the day after she started having bleeding. She reports bleeding is similar to a cycle. She is still currently bleeding. She was seen at [facility] ED on 6/22. Beta at that time was 94. Ultrasound at that time revealed no IUP. EMS 1.6cm. Ovaries normal.       ROS:   Review of Systems   Constitutional: Negative for activity change, appetite change, chills, diaphoresis, fatigue, fever and unexpected weight change.   HENT: Negative for mouth sores and tinnitus.    Eyes: Negative for discharge and visual disturbance.   Respiratory: Negative for cough, shortness of breath and wheezing.    Cardiovascular: Negative for chest pain, palpitations and leg swelling.   Gastrointestinal: Negative for abdominal pain, bloating, blood in stool, constipation, diarrhea, nausea and vomiting.   Endocrine: Negative for diabetes, hair loss, hot flashes, hyperthyroidism and hypothyroidism.   Genitourinary: Positive for vaginal bleeding. Negative for dysuria, flank pain, frequency, genital sores, hematuria, urgency, vaginal discharge, vaginal pain, postcoital bleeding and vaginal odor.   Musculoskeletal: Negative for back pain, joint swelling and myalgias.   Integumentary:  Negative for rash, acne, breast mass, nipple discharge and breast skin changes.   Neurological: Negative for seizures, syncope, numbness and headaches.   Hematological: Negative for adenopathy. Does not bruise/bleed easily.   Psychiatric/Behavioral: Negative for depression and sleep disturbance. The patient is not nervous/anxious.    Breast: Negative for mass, mastodynia, nipple discharge and skin changes          Objective:    Vital  Signs:  Vitals:    06/24/20 1201   BP: (!) 124/96   Pulse: 85   Resp: 16       Physical Exam:  General:  alert, cooperative, no distress   Head Normocephalic, without obvious abnormality, atraumatic   Neck .supple, symmetrical, trachea midline   Skin:  Skin color, texture, turgor normal. No rashes or lesions   Abdomen:  soft, non-tender; bowel sounds normal   Pelvis: External genitalia: normal general appearance  Urinary system: urethral meatus normal, bladder nontender  Vaginal: normal mucosa without prolapse or lesions, presence of blood  Cervix: normal appearance  Uterus: normal size, shape, position  Adnexa: normal size, nontender bilaterally   Extremities extremities normal, atraumatic, no cyanosis or edema   Neurologic Grossly normal          Assessment:      1. Vaginal bleeding affecting early pregnancy          Plan:      PLAN:   1. Repeat hcg  2. T&S

## 2020-06-24 NOTE — TELEPHONE ENCOUNTER
----- Message from Loni Armendariz sent at 6/24/2020  2:27 PM CDT -----  Contact: Self  Tammy Lopez  MRN: [id number]  Home Phone      [phone]  Work Phone      [phone]  Mobile          [phone]    Patient Care Team:  Sherice Ross NP as PCP - General (Family Medicine)  OB?  What phone number can you be reached at? [phone]  Message:   Pt would like results of blood work. Please advise.

## 2020-06-26 ENCOUNTER — LAB VISIT (OUTPATIENT)
Dept: LAB | Facility: HOSPITAL | Age: 20
End: 2020-06-26
Attending: OBSTETRICS & GYNECOLOGY
Payer: MEDICAID

## 2020-06-26 DIAGNOSIS — O20.9 VAGINAL BLEEDING AFFECTING EARLY PREGNANCY: ICD-10-CM

## 2020-06-26 LAB
ABO + RH BLD: NORMAL
BLD GP AB SCN CELLS X3 SERPL QL: NORMAL
HCG INTACT+B SERPL-ACNC: 5.2 MIU/ML

## 2020-06-26 PROCEDURE — 36415 COLL VENOUS BLD VENIPUNCTURE: CPT

## 2020-06-26 PROCEDURE — 84702 CHORIONIC GONADOTROPIN TEST: CPT

## 2020-06-26 PROCEDURE — 86850 RBC ANTIBODY SCREEN: CPT

## 2020-07-27 NOTE — ED NOTES
Pt provided a urine specimen cup at bedside with instructions that a clean catch urine sample is needed for a urinalysis; pt verbalizes understanding.  Will continue to monitor.   Hpi Title: Evaluation of a Skin Lesion

## 2020-08-21 ENCOUNTER — HOSPITAL ENCOUNTER (EMERGENCY)
Facility: HOSPITAL | Age: 20
Discharge: HOME | End: 2020-08-21
Attending: EMERGENCY MEDICINE
Payer: MEDICAID

## 2020-08-21 VITALS
DIASTOLIC BLOOD PRESSURE: 85 MMHG | WEIGHT: 280 LBS | OXYGEN SATURATION: 97 % | RESPIRATION RATE: 18 BRPM | HEART RATE: 95 BPM | SYSTOLIC BLOOD PRESSURE: 127 MMHG | TEMPERATURE: 99 F | HEIGHT: 67 IN | BODY MASS INDEX: 43.95 KG/M2
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
## 2020-08-21 LAB
B-HCG UR QL: POSITIVE
CTP QC/QA: YES

## 2020-08-21 PROCEDURE — 81025 URINE PREGNANCY TEST: CPT | Mod: ER | Performed by: EMERGENCY MEDICINE

## 2020-08-21 PROCEDURE — 99283 EMERGENCY DEPT VISIT LOW MDM: CPT | Mod: ER

## 2020-08-21 RX ORDER — METHYLPREDNISOLONE 4 MG/1
TABLET ORAL
Qty: 1 PACKAGE | Refills: 0 | Status: ON HOLD | OUTPATIENT
Start: 2020-08-21 | End: 2020-11-11 | Stop reason: HOSPADM

## 2020-08-21 RX ORDER — CETIRIZINE HYDROCHLORIDE 10 MG/1
10 TABLET ORAL
Status: DISCONTINUED | OUTPATIENT
Start: 2020-08-21 | End: 2020-08-21 | Stop reason: HOSPADM

## 2020-08-21 RX ORDER — PREDNISONE 20 MG/1
20 TABLET ORAL
Status: DISCONTINUED | OUTPATIENT
Start: 2020-08-21 | End: 2020-08-21 | Stop reason: HOSPADM

## 2020-08-21 NOTE — ED PROVIDER NOTES
"Encounter Date: 2020    SCRIBE #1 NOTE: I, Bernie Roman, am scribing for, and in the presence of,  Da Liu DNP. I have scribed the following portions of the note - Other sections scribed: HPI, ROS, PE.       History     Chief Complaint   Patient presents with    Rash     PRURITIC RASH TO LEFT ANTERIOR THIGH X 1 MONTHS; DENIES SOB     Tammy Lopez is a 20 y.o. female who presents to the ED complaining of bumps to her legs, arms and feet that she describes as an "allergic reaction" x 1 month. Patient is unsure what caused them. Endorses itching. States they were in her back and hands and are now in her legs. Received medication from urgent care which she tried without relief. Denies SOB and wheezing. Denies anybody else in the home with the same bumps.    The history is provided by the patient. No  was used.     Review of patient's allergies indicates:  No Known Allergies  Past Medical History:   Diagnosis Date    ADHD (attention deficit hyperactivity disorder)     Anxiety     Borderline personality disorder     Depression     Fatigue     Headache     History of psychiatric hospitalization     Hx of psychiatric care     Panic disorder     Psychiatric exam requested by authority     Psychiatric problem     PTSD (post-traumatic stress disorder)     Suicide attempt     4-5 by overdose and hanging     Therapy      History reviewed. No pertinent surgical history.  Family History   Problem Relation Age of Onset    Bipolar disorder Maternal Grandmother     Diabetes Maternal Grandmother     Breast cancer Neg Hx     Colon cancer Neg Hx     Ovarian cancer Neg Hx      Social History     Tobacco Use    Smoking status: Current Some Day Smoker     Packs/day: 0.00     Types: Cigarettes, Vaping with nicotine     Last attempt to quit: 2019     Years since quittin.7    Smokeless tobacco: Never Used    Tobacco comment: quit vaping 2019   Substance Use Topics "    Alcohol use: Yes     Frequency: Never     Comment: occasionally    Drug use: Yes     Types: Marijuana     Comment: last marijuana use 2/2020     Review of Systems   Constitutional: Negative for chills and fever.   HENT: Negative for congestion and sore throat.    Eyes: Negative for redness.   Respiratory: Negative for shortness of breath.    Cardiovascular: Negative for chest pain.   Gastrointestinal: Negative for diarrhea, nausea and vomiting.   Genitourinary: Negative for difficulty urinating.   Musculoskeletal: Negative for back pain.   Skin: Positive for rash.   Neurological: Negative for weakness.       Physical Exam     Initial Vitals [08/21/20 1056]   BP Pulse Resp Temp SpO2   127/85 95 18 98.5 °F (36.9 °C) 97 %      MAP       --         Physical Exam    Nursing note and vitals reviewed.  Constitutional: She appears well-developed and well-nourished. No distress.   HENT:   Head: Normocephalic and atraumatic.   Right Ear: External ear normal.   Left Ear: External ear normal.   Eyes: Conjunctivae are normal.   Neck: Normal range of motion. Neck supple.   Cardiovascular: Normal rate, regular rhythm and normal heart sounds. Exam reveals no gallop and no friction rub.    No murmur heard.  Pulmonary/Chest: Effort normal and breath sounds normal. No respiratory distress. She has no wheezes. She has no rhonchi. She has no rales.   Abdominal: Soft. Normal appearance. There is no abdominal tenderness.   Musculoskeletal: Normal range of motion. No edema.   Neurological: She is alert and oriented to person, place, and time. GCS score is 15. GCS eye subscore is 4. GCS verbal subscore is 5. GCS motor subscore is 6.   Skin: Skin is warm and dry. No rash noted.   Occasional wheals of erythema with pruritis to arms and legs (only exposed skin), no warmth or exudate   Psychiatric: She has a normal mood and affect. Her behavior is normal.         ED Course   Procedures  Labs Reviewed   POCT URINE PREGNANCY - Abnormal;  Notable for the following components:       Result Value    POC Preg Test, Ur Positive (*)     All other components within normal limits          Imaging Results    None          Medical Decision Making:   History:   Old Medical Records: I decided to obtain old medical records.  Clinical Tests:   Lab Tests: Ordered and Reviewed       APC / Resident Notes:   patient presents with  rash to arms and legs x 1 month.  No circumferential erythema, warmth, streaking or concern for cellulitis. Not vesicular or crusted following a dermatomal pattern, therefore do not believe this to be Herpes Zoster. Not c/w SJS, TEN or SSS: No mucosal involvement, No systemic complaints, No new meds.    will give medrol dosepack to go home with and PCP for f/u.  Patient was advised to return to the ED with any new or worsening symptoms, verbalized this understanding. They were given the opportunity to ask questions, which were reasonably addressed to the best of my ability and their apparent satisfaction.       Scribe Attestation:   Scribe #1: I performed the above scribed service and the documentation accurately describes the services I performed. I attest to the accuracy of the note.    This document was produced by a scribe under my direction and in my presence. I agree with the content of the note and have made any necessary edits.     -Da Liu DNP                        Clinical Impression:     1. Bug bite, initial encounter            Disposition:   Disposition: Discharged  Condition: Stable     ED Disposition Condition    Discharge Stable        ED Prescriptions     Medication Sig Dispense Start Date End Date Auth. Provider    methylPREDNISolone (MEDROL DOSEPACK) 4 mg tablet use as directed 1 Package 8/21/2020  Da Liu DNP        Follow-up Information     Follow up With Specialties Details Why Contact Info    Sherice Ross NP Family Medicine Schedule an appointment as soon as possible for a visit    [id number] [facility] [address]  [facility] [address]  [phone]                                       Da Liu, [facility]  08/21/20 1400

## 2020-08-21 NOTE — DISCHARGE INSTRUCTIONS
Benadryl or zyrtec over the counter.  May use hydrocortisone cream over the counter. Return to the Emergency department for any worsening or failure to improve, otherwise follow up with your primary care provider.

## 2020-08-28 ENCOUNTER — HOSPITAL ENCOUNTER (EMERGENCY)
Facility: HOSPITAL | Age: 20
Discharge: HOME | End: 2020-08-28
Attending: EMERGENCY MEDICINE
Payer: MEDICAID

## 2020-08-28 VITALS
BODY MASS INDEX: 45.36 KG/M2 | HEIGHT: 67 IN | WEIGHT: 289 LBS | DIASTOLIC BLOOD PRESSURE: 77 MMHG | HEART RATE: 84 BPM | RESPIRATION RATE: 19 BRPM | SYSTOLIC BLOOD PRESSURE: 117 MMHG | TEMPERATURE: 98 F | OXYGEN SATURATION: 99 %

## 2020-08-28 DIAGNOSIS — R10.13 EPIGASTRIC ABDOMINAL PAIN: Primary | ICD-10-CM

## 2020-08-28 LAB
B-HCG UR QL: POSITIVE
BACTERIA #/AREA URNS HPF: NORMAL /HPF
BILIRUB UR QL STRIP: NEGATIVE
CLARITY UR: CLEAR
COLOR UR: YELLOW
CTP QC/QA: YES
GLUCOSE UR QL STRIP: NEGATIVE
HGB UR QL STRIP: NEGATIVE
KETONES UR QL STRIP: NEGATIVE
LEUKOCYTE ESTERASE UR QL STRIP: ABNORMAL
MICROSCOPIC COMMENT: NORMAL
NITRITE UR QL STRIP: NEGATIVE
PH UR STRIP: 7 [PH] (ref 5–8)
PROT UR QL STRIP: NEGATIVE
RBC #/AREA URNS HPF: 1 /HPF (ref 0–4)
SP GR UR STRIP: 1.01 (ref 1–1.03)
SQUAMOUS #/AREA URNS HPF: 2 /HPF
URN SPEC COLLECT METH UR: ABNORMAL
UROBILINOGEN UR STRIP-ACNC: NEGATIVE EU/DL
WBC #/AREA URNS HPF: 4 /HPF (ref 0–5)

## 2020-08-28 PROCEDURE — 25000003 PHARM REV CODE 250: Performed by: PHYSICIAN ASSISTANT

## 2020-08-28 PROCEDURE — 81000 URINALYSIS NONAUTO W/SCOPE: CPT

## 2020-08-28 PROCEDURE — 99283 EMERGENCY DEPT VISIT LOW MDM: CPT

## 2020-08-28 PROCEDURE — 81025 URINE PREGNANCY TEST: CPT | Performed by: PHYSICIAN ASSISTANT

## 2020-08-28 RX ORDER — MAG HYDROX/ALUMINUM HYD/SIMETH 200-200-20
30 SUSPENSION, ORAL (FINAL DOSE FORM) ORAL
Status: COMPLETED | OUTPATIENT
Start: 2020-08-28 | End: 2020-08-28

## 2020-08-28 RX ADMIN — ALUMINUM HYDROXIDE, MAGNESIUM HYDROXIDE, AND SIMETHICONE 30 ML: 200; 200; 20 SUSPENSION ORAL at 05:08

## 2020-08-28 NOTE — ED TRIAGE NOTES
Pt states she is 5 weeks pregnant. Reports epigastric pain that started after helping a friend moving some furniture.

## 2020-08-28 NOTE — ED PROVIDER NOTES
Encounter Date: 2020    SCRIBE #1 NOTE: I, Charlie Disla, am scribing for, and in the presence of,  Clement Hopper PA-C. I have scribed the following portions of the note - Other sections scribed: HPI, ROS, PE.       History     Chief Complaint   Patient presents with    Abdominal Pain     Pt reports she is 5 weeks pregnant. Pt c/o abdominal pain that started after pushing a sofa yesterday. Denies N/V, vaginal bleeding     CC: Abdominal Pain    HPI: This 19 y/o female A2 who is currently 5w2d gravid by LMP 20 with no pertinent PMHx presents to the ED c/o cramping epigastric abd pain that started today.  Reports eating spicy chicken wings recently.  Pt reports she last met with OB yesterday for this pregnancy.  She states she recently had a miscarriage in 2020.  She rates her current pain severity 8/10.  She denies any nausea, vomiting.  No dysuria.  No vaginal bleeding.    The history is provided by the patient. No  was used.     Review of patient's allergies indicates:  No Known Allergies  Past Medical History:   Diagnosis Date    ADHD (attention deficit hyperactivity disorder)     Anxiety     Borderline personality disorder     Depression     Fatigue     Headache     History of psychiatric hospitalization     Hx of psychiatric care     Panic disorder     Psychiatric exam requested by authority     Psychiatric problem     PTSD (post-traumatic stress disorder)     Suicide attempt     4-5 by overdose and hanging     Therapy      History reviewed. No pertinent surgical history.  Family History   Problem Relation Age of Onset    Bipolar disorder Maternal Grandmother     Diabetes Maternal Grandmother     Breast cancer Neg Hx     Colon cancer Neg Hx     Ovarian cancer Neg Hx      Social History     Tobacco Use    Smoking status: Current Some Day Smoker     Packs/day: 0.00     Types: Cigarettes, Vaping with nicotine     Last attempt to quit: 2019     Years  since quittin.7    Smokeless tobacco: Never Used    Tobacco comment: quit vaping 2019   Substance Use Topics    Alcohol use: Yes     Frequency: Never     Comment: occasionally    Drug use: Yes     Types: Marijuana     Comment: last marijuana use 2020     Review of Systems   Constitutional: Negative for fever.   HENT: Negative for sore throat.    Eyes: Negative for visual disturbance.   Respiratory: Negative for shortness of breath.    Cardiovascular: Negative for chest pain.   Gastrointestinal: Positive for abdominal pain. Negative for nausea and vomiting.   Genitourinary: Negative for dysuria and vaginal bleeding.   Musculoskeletal: Negative for back pain.   Skin: Negative for rash.   Neurological: Negative for headaches.       Physical Exam     Initial Vitals [20 1651]   BP Pulse Resp Temp SpO2   135/81 95 18 98.5 °F (36.9 °C) 100 %      MAP       --         Physical Exam    Nursing note and vitals reviewed.  Constitutional: She appears well-developed and well-nourished. She is not diaphoretic. No distress.   HENT:   Head: Normocephalic and atraumatic.   Nose: Nose normal.   Eyes: Conjunctivae and EOM are normal. Pupils are equal, round, and reactive to light.   Neck: Normal range of motion. Neck supple.   Cardiovascular: Normal rate, regular rhythm, normal heart sounds and intact distal pulses.   Pulmonary/Chest: Breath sounds normal. No respiratory distress.   Abdominal: Soft. Bowel sounds are normal. She exhibits no distension. There is no abdominal tenderness. There is no rebound and no guarding.   Musculoskeletal: Normal range of motion. No tenderness or edema.   Neurological: She is alert and oriented to person, place, and time. She has normal strength.   Skin: Skin is warm and dry. Capillary refill takes less than 2 seconds.   Psychiatric: She has a normal mood and affect.         ED Course   Procedures  Labs Reviewed   URINALYSIS, REFLEX TO URINE CULTURE - Abnormal; Notable for the  following components:       Result Value    Leukocytes, UA Trace (*)     All other components within normal limits    Narrative:     Specimen Source->Urine   POCT URINE PREGNANCY - Abnormal; Notable for the following components:    POC Preg Test, Ur Positive (*)     All other components within normal limits   URINALYSIS MICROSCOPIC    Narrative:     Specimen Source->Urine          Imaging Results    None          Medical Decision Making:   Clinical Tests:   Lab Tests: Ordered and Reviewed  ED Management:  20-year-old pregnant patient with epigastric pain.  No vaginal bleeding.  No lower abdominal or pelvic pain.  Abdomen exam benign.  No nausea vomiting or dysuria.  Low suspicion for ectopic pregnancy.  Patient given Mylanta with complete resolution of symptoms.  Will discharge to follow-up with OBGYN.  Return precautions given.            Scribe Attestation:   Scribe #1: I performed the above scribed service and the documentation accurately describes the services I performed. I attest to the accuracy of the note.     I, Clement Hopper, personally performed the services described in this documentation. All medical record entries made by the scribe were at my direction and in my presence.  I have reviewed the chart and agree that the record reflects my personal performance and is accurate and complete                        Clinical Impression:       ICD-10-CM ICD-9-CM   1. Epigastric abdominal pain  R10.13 789.06             ED Disposition Condition    Discharge Stable        ED Prescriptions     None        Follow-up Information     Follow up With Specialties Details Why Contact Info    OBGYN  Schedule an appointment as soon as possible for a visit  For follow-up care     [facility] Emergency Medicine Go to  If symptoms worsen [address]  [facility] [id number] [phone]                                     Clement Hopper, PAElyC  08/28/20 1825

## 2020-09-14 ENCOUNTER — HOSPITAL ENCOUNTER (EMERGENCY)
Facility: HOSPITAL | Age: 20
Discharge: HOME | End: 2020-09-14
Attending: EMERGENCY MEDICINE
Payer: MEDICAID

## 2020-09-14 VITALS
BODY MASS INDEX: 32.18 KG/M2 | WEIGHT: 205 LBS | HEART RATE: 76 BPM | OXYGEN SATURATION: 96 % | DIASTOLIC BLOOD PRESSURE: 81 MMHG | HEIGHT: 67 IN | RESPIRATION RATE: 16 BRPM | TEMPERATURE: 98 F | SYSTOLIC BLOOD PRESSURE: 114 MMHG

## 2020-09-14 DIAGNOSIS — N30.00 ACUTE CYSTITIS WITHOUT HEMATURIA: Primary | ICD-10-CM

## 2020-09-14 DIAGNOSIS — R07.9 CHEST PAIN: ICD-10-CM

## 2020-09-14 LAB
ALBUMIN SERPL BCP-MCNC: 3.8 G/DL (ref 3.5–5.2)
ALP SERPL-CCNC: 61 U/L (ref 55–135)
ALT SERPL W/O P-5'-P-CCNC: 13 U/L (ref 10–44)
ANION GAP SERPL CALC-SCNC: 9 MMOL/L (ref 8–16)
AST SERPL-CCNC: 15 U/L (ref 10–40)
BACTERIA #/AREA URNS HPF: ABNORMAL /HPF
BASOPHILS # BLD AUTO: 0.01 K/UL (ref 0–0.2)
BASOPHILS NFR BLD: 0.3 % (ref 0–1.9)
BILIRUB SERPL-MCNC: 0.7 MG/DL (ref 0.1–1)
BILIRUB UR QL STRIP: NEGATIVE
BNP SERPL-MCNC: <10 PG/ML (ref 0–99)
BUN SERPL-MCNC: 6 MG/DL (ref 6–20)
CALCIUM SERPL-MCNC: 10.5 MG/DL (ref 8.7–10.5)
CHLORIDE SERPL-SCNC: 102 MMOL/L (ref 95–110)
CLARITY UR: CLEAR
CO2 SERPL-SCNC: 24 MMOL/L (ref 23–29)
COLOR UR: YELLOW
CREAT SERPL-MCNC: 0.6 MG/DL (ref 0.5–1.4)
DIFFERENTIAL METHOD: ABNORMAL
EOSINOPHIL # BLD AUTO: 0 K/UL (ref 0–0.5)
EOSINOPHIL NFR BLD: 0.8 % (ref 0–8)
ERYTHROCYTE [DISTWIDTH] IN BLOOD BY AUTOMATED COUNT: 13.2 % (ref 11.5–14.5)
EST. GFR  (AFRICAN AMERICAN): >60 ML/MIN/1.73 M^2
EST. GFR  (NON AFRICAN AMERICAN): >60 ML/MIN/1.73 M^2
GLUCOSE SERPL-MCNC: 83 MG/DL (ref 70–110)
GLUCOSE UR QL STRIP: NEGATIVE
HCT VFR BLD AUTO: 35.2 % (ref 37–48.5)
HGB BLD-MCNC: 11.5 G/DL (ref 12–16)
HGB UR QL STRIP: NEGATIVE
IMM GRANULOCYTES # BLD AUTO: 0.01 K/UL (ref 0–0.04)
IMM GRANULOCYTES NFR BLD AUTO: 0.3 % (ref 0–0.5)
KETONES UR QL STRIP: ABNORMAL
LEUKOCYTE ESTERASE UR QL STRIP: ABNORMAL
LYMPHOCYTES # BLD AUTO: 1 K/UL (ref 1–4.8)
LYMPHOCYTES NFR BLD: 26.8 % (ref 18–48)
MCH RBC QN AUTO: 27.6 PG (ref 27–31)
MCHC RBC AUTO-ENTMCNC: 32.7 G/DL (ref 32–36)
MCV RBC AUTO: 84 FL (ref 82–98)
MICROSCOPIC COMMENT: ABNORMAL
MONOCYTES # BLD AUTO: 0.4 K/UL (ref 0.3–1)
MONOCYTES NFR BLD: 10.1 % (ref 4–15)
NEUTROPHILS # BLD AUTO: 2.3 K/UL (ref 1.8–7.7)
NEUTROPHILS NFR BLD: 61.7 % (ref 38–73)
NITRITE UR QL STRIP: NEGATIVE
NRBC BLD-RTO: 0 /100 WBC
PH UR STRIP: 6 [PH] (ref 5–8)
PLATELET # BLD AUTO: 266 K/UL (ref 150–350)
PMV BLD AUTO: 10.7 FL (ref 9.2–12.9)
POTASSIUM SERPL-SCNC: 3.6 MMOL/L (ref 3.5–5.1)
PROT SERPL-MCNC: 7.9 G/DL (ref 6–8.4)
PROT UR QL STRIP: NEGATIVE
RBC # BLD AUTO: 4.17 M/UL (ref 4–5.4)
SODIUM SERPL-SCNC: 135 MMOL/L (ref 136–145)
SP GR UR STRIP: 1.02 (ref 1–1.03)
TROPONIN I SERPL DL<=0.01 NG/ML-MCNC: <0.006 NG/ML (ref 0–0.03)
URN SPEC COLLECT METH UR: ABNORMAL
UROBILINOGEN UR STRIP-ACNC: NEGATIVE EU/DL
WBC # BLD AUTO: 3.66 K/UL (ref 3.9–12.7)
WBC #/AREA URNS HPF: 15 /HPF (ref 0–5)

## 2020-09-14 PROCEDURE — 81000 URINALYSIS NONAUTO W/SCOPE: CPT

## 2020-09-14 PROCEDURE — 80053 COMPREHEN METABOLIC PANEL: CPT

## 2020-09-14 PROCEDURE — 87086 URINE CULTURE/COLONY COUNT: CPT

## 2020-09-14 PROCEDURE — 93005 ELECTROCARDIOGRAM TRACING: CPT

## 2020-09-14 PROCEDURE — 87077 CULTURE AEROBIC IDENTIFY: CPT

## 2020-09-14 PROCEDURE — 83880 ASSAY OF NATRIURETIC PEPTIDE: CPT

## 2020-09-14 PROCEDURE — 87088 URINE BACTERIA CULTURE: CPT

## 2020-09-14 PROCEDURE — 87186 SC STD MICRODIL/AGAR DIL: CPT

## 2020-09-14 PROCEDURE — 85025 COMPLETE CBC W/AUTO DIFF WBC: CPT

## 2020-09-14 PROCEDURE — 99284 EMERGENCY DEPT VISIT MOD MDM: CPT | Mod: 25

## 2020-09-14 PROCEDURE — 93010 ELECTROCARDIOGRAM REPORT: CPT | Mod: ,,, | Performed by: INTERNAL MEDICINE

## 2020-09-14 PROCEDURE — 84484 ASSAY OF TROPONIN QUANT: CPT

## 2020-09-14 PROCEDURE — 93010 EKG 12-LEAD: ICD-10-PCS | Mod: ,,, | Performed by: INTERNAL MEDICINE

## 2020-09-14 PROCEDURE — 25000003 PHARM REV CODE 250: Performed by: EMERGENCY MEDICINE

## 2020-09-14 RX ORDER — NITROFURANTOIN 25; 75 MG/1; MG/1
100 CAPSULE ORAL 2 TIMES DAILY
Qty: 10 CAPSULE | Refills: 0 | Status: SHIPPED | OUTPATIENT
Start: 2020-09-14 | End: 2020-09-19

## 2020-09-14 RX ORDER — FERROUS GLUCONATE 324(38)MG
324 TABLET ORAL
Status: ON HOLD | COMMUNITY
Start: 2020-08-28 | End: 2021-06-02 | Stop reason: HOSPADM

## 2020-09-14 RX ORDER — ESCITALOPRAM OXALATE 20 MG/1
20 TABLET ORAL DAILY
Status: ON HOLD | COMMUNITY
End: 2020-10-27 | Stop reason: HOSPADM

## 2020-09-14 RX ORDER — CALCIUM CARBONATE 200(500)MG
1 TABLET,CHEWABLE ORAL DAILY
Qty: 30 TABLET | Refills: 11 | Status: ON HOLD | OUTPATIENT
Start: 2020-09-14 | End: 2021-06-02 | Stop reason: HOSPADM

## 2020-09-14 RX ADMIN — LIDOCAINE HYDROCHLORIDE: 20 SOLUTION ORAL; TOPICAL at 12:09

## 2020-09-14 NOTE — ED PROVIDER NOTES
Encounter Date: 2020    SCRIBE #1 NOTE: I, Dariel Aguilera, am scribing for, and in the presence of, Flex Weber MD.       History     Chief Complaint   Patient presents with    Fatigue     EMS reports pt c/o fatigue x 1 week that is worsening, also some epigastric pain that is radiating up to the throat. pt is 7 weeks pregnant. no abdominal pain or vaginal bleeding at this time.     Abdominal Pain     Tammy Lopez is a 20 year old female who is seven weeks pregnant () and presents to the ED with an onset of epigastric pain which has begun radiating into her chest for the past two days. She states that her initial symptom began one week ago and is accompanied by nausea. Patient has not taken antacids and denies any improvement with drinking water. She also observed one episode of brown vaginal discharge this morning. Patient reports that both of her prior miscarriages were early on in pregnancy. Denies any chills, fever, sore throat, cough, vaginal bleeding, pelvic cramping, or body aches. Past medical history includes anxiety and depression. No history of blood clots. Last seen by OB/Gyn two weeks ago. Denies any alcohol, tobacco, or other drug use.    The history is provided by the patient.     Review of patient's allergies indicates:  No Known Allergies  Past Medical History:   Diagnosis Date    ADHD (attention deficit hyperactivity disorder)     Anxiety     Borderline personality disorder     Depression     Fatigue     Headache     History of psychiatric hospitalization     Hx of psychiatric care     Panic disorder     Psychiatric exam requested by authority     Psychiatric problem     PTSD (post-traumatic stress disorder)     Suicide attempt     4-5 by overdose and hanging     Therapy      No past surgical history on file.  Family History   Problem Relation Age of Onset    Bipolar disorder Maternal Grandmother     Diabetes Maternal Grandmother     Breast cancer Neg Hx      Colon cancer Neg Hx     Ovarian cancer Neg Hx      Social History     Tobacco Use    Smoking status: Current Some Day Smoker     Packs/day: 0.00     Types: Cigarettes, Vaping with nicotine     Last attempt to quit: 2019     Years since quittin.8    Smokeless tobacco: Never Used    Tobacco comment: quit vaping 2019   Substance Use Topics    Alcohol use: Yes     Frequency: Never     Comment: occasionally    Drug use: Yes     Types: Marijuana     Comment: last marijuana use 2020     Review of Systems   Constitutional: Negative for chills and fever.   HENT: Negative for sore throat.    Respiratory: Negative for shortness of breath.    Cardiovascular: Positive for chest pain.   Gastrointestinal: Positive for abdominal pain. Negative for nausea.   Genitourinary: Positive for vaginal discharge. Negative for dysuria, pelvic pain and vaginal bleeding.   Musculoskeletal: Negative for back pain and myalgias.   Skin: Negative for rash.   Neurological: Negative for weakness.       Physical Exam     Initial Vitals [20 1156]   BP Pulse Resp Temp SpO2   110/84 90 18 98.4 °F (36.9 °C) 98 %      MAP       --         Physical Exam    Nursing note and vitals reviewed.  Constitutional: She appears well-developed and well-nourished. She is not diaphoretic. No distress.   HENT:   Head: Normocephalic and atraumatic.   Nose: Nose normal.   Eyes: EOM are normal. Pupils are equal, round, and reactive to light. No scleral icterus.   Neck: Normal range of motion. Neck supple. No thyromegaly present. No JVD present.   Cardiovascular: Normal rate, regular rhythm, normal heart sounds and intact distal pulses. Exam reveals no gallop and no friction rub.    No murmur heard.  Pulmonary/Chest: Breath sounds normal. No stridor. No respiratory distress. She has no wheezes. She has no rhonchi. She has no rales.   Abdominal: Soft. Normal appearance and bowel sounds are normal. She exhibits no distension. There is no abdominal  tenderness. There is no rebound and no guarding.   Musculoskeletal: Normal range of motion. No tenderness or edema.   Neurological: She is alert and oriented to person, place, and time. She has normal strength. No cranial nerve deficit. GCS score is 15. GCS eye subscore is 4. GCS verbal subscore is 5. GCS motor subscore is 6.   Skin: Skin is warm and dry. Capillary refill takes less than 2 seconds. No rash noted.   Psychiatric: She has a normal mood and affect. Her behavior is normal.         ED Course   Procedures  Labs Reviewed   CBC W/ AUTO DIFFERENTIAL - Abnormal; Notable for the following components:       Result Value    WBC 3.66 (*)     Hemoglobin 11.5 (*)     Hematocrit 35.2 (*)     All other components within normal limits   COMPREHENSIVE METABOLIC PANEL - Abnormal; Notable for the following components:    Sodium 135 (*)     All other components within normal limits   URINALYSIS, REFLEX TO URINE CULTURE - Abnormal; Notable for the following components:    Ketones, UA Trace (*)     Leukocytes, UA 2+ (*)     All other components within normal limits    Narrative:     Specimen Source->Urine   URINALYSIS MICROSCOPIC - Abnormal; Notable for the following components:    WBC, UA 15 (*)     All other components within normal limits    Narrative:     Specimen Source->Urine   CULTURE, URINE   TROPONIN I   B-TYPE NATRIURETIC PEPTIDE     EKG Readings: (Independently Interpreted)   Rhythm: Normal Sinus Rhythm. Ectopy: No Ectopy. Conduction: Normal. ST Segments: Normal ST Segments. T Waves: Normal. Clinical Impression: Normal Sinus Rhythm   Normal sinus rhythm at a rate of 77 bpm with normal axis and normal intervals. No acute ST segment or T wave abnormalities.     ECG Results          EKG 12-lead (In process)  Result time 09/14/20 14:15:39    In process by Interface, Lab In [facility] (09/14/20 14:15:39)                 Narrative:    Test Reason : R07.9,    Vent. Rate : 077 BPM     Atrial Rate : 077 BPM     P-R Int : 182  ms          QRS Dur : 092 ms      QT Int : 352 ms       P-R-T Axes : 044 054 032 degrees     QTc Int : 398 ms    Normal sinus rhythm  Normal ECG  When compared with ECG of 21-MAR-2020 15:32,  No significant change was found    Referred By: SILVIO   SELF           Confirmed By:                   In process by Interface, Lab In [facility] (09/14/20 14:15:17)                 Narrative:    Test Reason : R07.9,    Vent. Rate : 077 BPM     Atrial Rate : 077 BPM     P-R Int : 182 ms          QRS Dur : 092 ms      QT Int : 352 ms       P-R-T Axes : 044 054 032 degrees     QTc Int : 398 ms    Normal sinus rhythm  Normal ECG  When compared with ECG of 21-MAR-2020 15:32,  No significant change was found    Referred By: SILVIO   SELF           Confirmed By:                             Imaging Results    None          Medical Decision Making:   History:   Old Medical Records: I decided to obtain old medical records.  Initial Assessment:   20-year-old female presenting with epigastric discomfort and chest discomfort.  Patient reports symptoms worse with lying down.  Reports being 7 weeks pregnant.  Denies abdominal pain.  Denies cough, shortness of breath, hemoptysis, lower extremity edema.  No pelvic cramping or discomfort.  Minimal brownish discharge this morning.  Vitals normal.  Patient well appearing no acute distress.  No abdominal tenderness to palpation.  No suprapubic tenderness to palpation.  Exam otherwise unremarkable.  Patient reports resolution of symptoms with GI cocktail.  EKG normal.  Troponin negative.  Labs otherwise show mild UTI.  Patient given prescription for Macrobid.  Believe she is otherwise safe for discharge home.  Doubt ACS, PE, pneumonia.  Doubt appendicitis, cholecystitis, pancreatitis, biliary colic.  Discharge very minimal.  No pelvic pain.  Discussed need to follow-up with primary care provider.  Independently Interpreted Test(s):   I have ordered and independently interpreted EKG Reading(s) -  see prior notes  Clinical Tests:   Lab Tests: Ordered and Reviewed  Medical Tests: Ordered and Reviewed            Scribe Attestation:   Scribe #1: I performed the above scribed service and the documentation accurately describes the services I performed. I attest to the accuracy of the note.                      Clinical Impression:       ICD-10-CM ICD-9-CM   1. Acute cystitis without hematuria  N30.00 595.0   2. Chest pain  R07.9 786.50         Disposition:   Disposition: Discharged  Condition: Stable     ED Disposition Condition    Discharge Stable        ED Prescriptions     Medication Sig Dispense Start Date End Date Auth. Provider    nitrofurantoin, macrocrystal-monohydrate, (MACROBID) 100 MG capsule Take 1 capsule (100 mg total) by mouth 2 (two) times daily. for 5 days 10 capsule 9/14/2020 9/19/2020 Flex Weber MD    calcium carbonate (TUMS) 200 mg calcium (500 mg) chewable tablet Take 1 tablet (500 mg total) by mouth once daily. or as directed on the bottle 30 tablet 9/14/2020 9/14/2021 Flex Weber MD        Follow-up Information    None                     I, Flex Weber, personally performed the services described in this documentation. All medical record entries made by the scribe were at my direction and in my presence. I have reviewed the chart and agree that the record reflects my personal performance and is accurate and complete.                 Flex Weber MD  09/14/20 [id number]

## 2020-09-16 LAB — BACTERIA UR CULT: ABNORMAL

## 2020-10-21 ENCOUNTER — HOSPITAL ENCOUNTER (EMERGENCY)
Facility: HOSPITAL | Age: 20
Discharge: PSYCHIATRIC OR SUBSTANCE USE FACILITY | End: 2020-10-22
Attending: SURGERY
Payer: MEDICAID

## 2020-10-21 VITALS
OXYGEN SATURATION: 98 % | HEART RATE: 97 BPM | BODY MASS INDEX: 30.25 KG/M2 | SYSTOLIC BLOOD PRESSURE: 131 MMHG | DIASTOLIC BLOOD PRESSURE: 79 MMHG | WEIGHT: 193.13 LBS | RESPIRATION RATE: 18 BRPM | TEMPERATURE: 98 F

## 2020-10-21 DIAGNOSIS — R45.851 DEPRESSION WITH SUICIDAL IDEATION: Primary | ICD-10-CM

## 2020-10-21 DIAGNOSIS — F32.A DEPRESSION WITH SUICIDAL IDEATION: Primary | ICD-10-CM

## 2020-10-21 LAB
ALBUMIN SERPL BCP-MCNC: 3.3 G/DL (ref 3.5–5.2)
ALP SERPL-CCNC: 44 U/L (ref 55–135)
ALT SERPL W/O P-5'-P-CCNC: 40 U/L (ref 10–44)
AMPHET+METHAMPHET UR QL: NEGATIVE
ANION GAP SERPL CALC-SCNC: 10 MMOL/L (ref 8–16)
APAP SERPL-MCNC: <3 UG/ML (ref 10–20)
AST SERPL-CCNC: 26 U/L (ref 10–40)
BARBITURATES UR QL SCN>200 NG/ML: NEGATIVE
BASOPHILS # BLD AUTO: 0.01 K/UL (ref 0–0.2)
BASOPHILS NFR BLD: 0.2 % (ref 0–1.9)
BENZODIAZ UR QL SCN>200 NG/ML: NEGATIVE
BILIRUB SERPL-MCNC: 0.3 MG/DL (ref 0.1–1)
BILIRUB UR QL STRIP: NEGATIVE
BUN SERPL-MCNC: 6 MG/DL (ref 6–20)
BZE UR QL SCN: NEGATIVE
CALCIUM SERPL-MCNC: 9.7 MG/DL (ref 8.7–10.5)
CANNABINOIDS UR QL SCN: NEGATIVE
CHLORIDE SERPL-SCNC: 105 MMOL/L (ref 95–110)
CLARITY UR: CLEAR
CO2 SERPL-SCNC: 22 MMOL/L (ref 23–29)
COLOR UR: YELLOW
CREAT SERPL-MCNC: 0.6 MG/DL (ref 0.5–1.4)
CREAT UR-MCNC: 242.8 MG/DL (ref 15–325)
DIFFERENTIAL METHOD: ABNORMAL
EOSINOPHIL # BLD AUTO: 0 K/UL (ref 0–0.5)
EOSINOPHIL NFR BLD: 0.9 % (ref 0–8)
ERYTHROCYTE [DISTWIDTH] IN BLOOD BY AUTOMATED COUNT: 12.7 % (ref 11.5–14.5)
EST. GFR  (AFRICAN AMERICAN): >60 ML/MIN/1.73 M^2
EST. GFR  (NON AFRICAN AMERICAN): >60 ML/MIN/1.73 M^2
ETHANOL SERPL-MCNC: <10 MG/DL
GLUCOSE SERPL-MCNC: 85 MG/DL (ref 70–110)
GLUCOSE UR QL STRIP: NEGATIVE
HCG INTACT+B SERPL-ACNC: NORMAL MIU/ML
HCT VFR BLD AUTO: 31.7 % (ref 37–48.5)
HGB BLD-MCNC: 10.7 G/DL (ref 12–16)
HGB UR QL STRIP: NEGATIVE
IMM GRANULOCYTES # BLD AUTO: 0.01 K/UL (ref 0–0.04)
IMM GRANULOCYTES NFR BLD AUTO: 0.2 % (ref 0–0.5)
KETONES UR QL STRIP: ABNORMAL
LEUKOCYTE ESTERASE UR QL STRIP: NEGATIVE
LYMPHOCYTES # BLD AUTO: 1.1 K/UL (ref 1–4.8)
LYMPHOCYTES NFR BLD: 24.1 % (ref 18–48)
MCH RBC QN AUTO: 28.3 PG (ref 27–31)
MCHC RBC AUTO-ENTMCNC: 33.8 G/DL (ref 32–36)
MCV RBC AUTO: 84 FL (ref 82–98)
METHADONE UR QL SCN>300 NG/ML: NEGATIVE
MONOCYTES # BLD AUTO: 0.4 K/UL (ref 0.3–1)
MONOCYTES NFR BLD: 8.5 % (ref 4–15)
NEUTROPHILS # BLD AUTO: 3.1 K/UL (ref 1.8–7.7)
NEUTROPHILS NFR BLD: 66.1 % (ref 38–73)
NITRITE UR QL STRIP: NEGATIVE
NRBC BLD-RTO: 0 /100 WBC
OPIATES UR QL SCN: NEGATIVE
PCP UR QL SCN>25 NG/ML: NEGATIVE
PH UR STRIP: 7 [PH] (ref 5–8)
PLATELET # BLD AUTO: 232 K/UL (ref 150–350)
PMV BLD AUTO: 10.2 FL (ref 9.2–12.9)
POTASSIUM SERPL-SCNC: 3.4 MMOL/L (ref 3.5–5.1)
PROT SERPL-MCNC: 6.8 G/DL (ref 6–8.4)
PROT UR QL STRIP: ABNORMAL
RBC # BLD AUTO: 3.78 M/UL (ref 4–5.4)
SALICYLATES SERPL-MCNC: <5 MG/DL (ref 15–30)
SARS-COV-2 RDRP RESP QL NAA+PROBE: NEGATIVE
SODIUM SERPL-SCNC: 137 MMOL/L (ref 136–145)
SP GR UR STRIP: >=1.03 (ref 1–1.03)
T4 FREE SERPL-MCNC: 1.43 NG/DL (ref 0.71–1.51)
TOXICOLOGY INFORMATION: NORMAL
TSH SERPL DL<=0.005 MIU/L-ACNC: <0.01 UIU/ML (ref 0.4–4)
URN SPEC COLLECT METH UR: ABNORMAL
UROBILINOGEN UR STRIP-ACNC: NEGATIVE EU/DL
WBC # BLD AUTO: 4.69 K/UL (ref 3.9–12.7)

## 2020-10-21 PROCEDURE — 80053 COMPREHEN METABOLIC PANEL: CPT

## 2020-10-21 PROCEDURE — 84439 ASSAY OF FREE THYROXINE: CPT

## 2020-10-21 PROCEDURE — 80329 ANALGESICS NON-OPIOID 1 OR 2: CPT

## 2020-10-21 PROCEDURE — 82607 VITAMIN B-12: CPT

## 2020-10-21 PROCEDURE — 84481 FREE ASSAY (FT-3): CPT

## 2020-10-21 PROCEDURE — 82746 ASSAY OF FOLIC ACID SERUM: CPT

## 2020-10-21 PROCEDURE — 81003 URINALYSIS AUTO W/O SCOPE: CPT | Mod: 59

## 2020-10-21 PROCEDURE — 85025 COMPLETE CBC W/AUTO DIFF WBC: CPT

## 2020-10-21 PROCEDURE — 99285 EMERGENCY DEPT VISIT HI MDM: CPT | Mod: 25

## 2020-10-21 PROCEDURE — 80307 DRUG TEST PRSMV CHEM ANLYZR: CPT

## 2020-10-21 PROCEDURE — 36415 COLL VENOUS BLD VENIPUNCTURE: CPT

## 2020-10-21 PROCEDURE — 84702 CHORIONIC GONADOTROPIN TEST: CPT

## 2020-10-21 PROCEDURE — 82306 VITAMIN D 25 HYDROXY: CPT

## 2020-10-21 PROCEDURE — 84443 ASSAY THYROID STIM HORMONE: CPT

## 2020-10-21 PROCEDURE — 80320 DRUG SCREEN QUANTALCOHOLS: CPT

## 2020-10-21 PROCEDURE — U0002 COVID-19 LAB TEST NON-CDC: HCPCS

## 2020-10-21 NOTE — ED TRIAGE NOTES
Patient reports she is pregnant and has been experiencing severe depression which has her feeling down. Patient denies any current SI at this time. Patient reports it has been three months since she took her Lexapro.

## 2020-10-22 PROBLEM — F32.A DEPRESSION WITH SUICIDAL IDEATION: Status: ACTIVE | Noted: 2020-10-22

## 2020-10-22 PROBLEM — R11.0 NAUSEA: Status: ACTIVE | Noted: 2020-10-22

## 2020-10-22 PROBLEM — R79.89 DECREASED THYROID STIMULATING HORMONE (TSH) LEVEL: Status: ACTIVE | Noted: 2020-10-22

## 2020-10-22 PROBLEM — R45.851 DEPRESSION WITH SUICIDAL IDEATION: Status: ACTIVE | Noted: 2020-10-22

## 2020-10-22 PROBLEM — Z3A.13 13 WEEKS GESTATION OF PREGNANCY: Status: ACTIVE | Noted: 2020-10-22

## 2020-10-22 LAB
25(OH)D3+25(OH)D2 SERPL-MCNC: 15 NG/ML (ref 30–96)
FOLATE SERPL-MCNC: 13.9 NG/ML (ref 4–24)
T3FREE SERPL-MCNC: 5.1 PG/ML (ref 2.3–4.2)
VIT B12 SERPL-MCNC: 459 PG/ML (ref 210–950)

## 2020-10-22 NOTE — ED NOTES
Asleep. Resp even and unlabored. Bed Low SR up x2. Continuous visual monitoring maintained for patient safety.

## 2020-10-22 NOTE — ED NOTES
Patient remains asleep. Resp even and unlabored. Bed low SR up x2. Continuous visual monitoring maintained for patient safety. Will continue to monitor.

## 2020-10-22 NOTE — ED NOTES
Patient resting comfortably on stretcher. Denies SI states that she believes that because of pregnancy and hormones she is depressed. Patient is calm and cooperative at this time. States she has had prior issues with depressions and stopped taking Lexapro 3 months ago.

## 2020-10-22 NOTE — ED NOTES
Patient asleep resp even and unlabored. Bed low SR up X 2 continuous visual monitoring maintained for patient safety. Will continue to monitor.

## 2020-10-22 NOTE — ED NOTES
Asleep. Resp even and unlabored. Bed low SR up x2. Continuous visual monitoring maintained by staff.

## 2020-10-22 NOTE — ED PROVIDER NOTES
[facility]                                                 Chief Complaint  20 y.o. female with Depression      History of Present Illness  Tammy Lopez presents to the emergency room with depression  Pt is currently 13 weeks pregnant, no pregnancy related complaint  Pt has no signs of anger or violence, states she wants to kill herself  Family wants to get a psychiatric evaluation this evening in the ER    The history is provided by the patient   device was not used during this ER visit  Medical history: ADHD and depression  History reviewed. No pertinent surgical history.   No Known Allergies   History reviewed. No pertinent family history.    I have reviewed all of this patient's past medical, surgical, family, and social   histories as well as active allergies and medications documented in the  electronic medical record    Review of Systems and Physical Exam      Review of Systems  -- Constitution - no fever, denies fatigue, no weakness, no chills  -- Eyes - no tearing or redness, no visual disturbance  -- Ear, Nose - no tinnitus or earache, no nasal congestion or discharge  -- Mouth,Throat - no sore throat, no toothache, normal voice, normal swallowing  -- Respiratory - denies cough and congestion, no shortness of breath, no DELUCA  -- Cardiovascular - denies chest pain, no palpitations, denies claudication  -- Gastrointestinal - denies abdominal pain, nausea, vomiting, or diarrhea  -- Genitourinary - no dysuria, denies flank pain, no hematuria, no STD risk  -- Musculoskeletal - denies back pain, negative for trauma or injury   -- Neurological - no headache, denies weakness or seizure; no LOC  -- Skin - denies pallor, rash, or changes in skin. no hives or welts noted  -- Psychiatric - suicidal ideation and depression, no psychosis     Vital Signs  Her tympanic temperature is 98 °F (36.7 °C).   Her blood pressure is 131/79 and her pulse is 97.   Her respiration is 18 and  oxygen saturation is 98%.     Physical Exam  -- Nursing note and vitals reviewed  -- Constitutional: Appears well-developed and well-nourished  -- Head: Atraumatic. Normocephalic. No obvious abnormality  -- Eyes: Pupils are equal and reactive to light. Normal conjunctiva and lids  -- Cardiac: Normal rate, regular rhythm and normal heart sounds  -- Pulmonary: Normal respiratory effort, breath sounds clear to auscultation  -- Abdominal: Soft, no tenderness. Normal bowel sounds. Normal liver edge  -- Musculoskeletal: Normal range of motion, no effusions. Joints stable   -- Neurological: No focal deficits. Showed good interaction with staff  -- Vascular: Posterior tibial, dorsalis pedis and radial pulses 2+ bilaterally    -- Lymphatics: No cervical or peripheral lymphadenopathy. No edema noted  -- Skin: Warm and dry. No evidence of rash or cellulitis    Emergency Room Course      Diagnosis  [F32.9, R45.851] Depression with suicidal ideation (Primary)    Disposition and Plan  -- Disposition: PEC  -- Condition: stable  -- Pt will be placed in a psychiatric facility  -- The patient is a direct observation until placement  -- The patient has been made aware of his or her rights while under PEC in the ER  -- All questions have been answered; will follow ER protocols until placement    Lab work was performed in the ER, this patient is cleared for psychiatric placement     This note is dictated on Dragon Natural Speaking word recognition program.  There are word recognition mistakes that are occasionally missed on review.          Graham Goff MD  10/21/20 1955

## 2020-11-05 ENCOUNTER — HOSPITAL ENCOUNTER (INPATIENT)
Facility: HOSPITAL | Age: 20
LOS: 7 days | Discharge: HOME | DRG: 832 | End: 2020-11-12
Attending: PSYCHIATRY & NEUROLOGY | Admitting: PSYCHIATRY & NEUROLOGY
Payer: MEDICAID

## 2020-11-05 DIAGNOSIS — F32.A DEPRESSION WITH SUICIDAL IDEATION: ICD-10-CM

## 2020-11-05 DIAGNOSIS — N30.00 ACUTE CYSTITIS WITHOUT HEMATURIA: ICD-10-CM

## 2020-11-05 DIAGNOSIS — F33.2 SEVERE EPISODE OF RECURRENT MAJOR DEPRESSIVE DISORDER, WITHOUT PSYCHOTIC FEATURES: Primary | ICD-10-CM

## 2020-11-05 DIAGNOSIS — R45.851 DEPRESSION WITH SUICIDAL IDEATION: ICD-10-CM

## 2020-11-05 DIAGNOSIS — Z3A.16 16 WEEKS GESTATION OF PREGNANCY: ICD-10-CM

## 2020-11-05 DIAGNOSIS — F17.200 SMOKER: ICD-10-CM

## 2020-11-05 PROCEDURE — 80061 LIPID PANEL: CPT

## 2020-11-05 PROCEDURE — 83036 HEMOGLOBIN GLYCOSYLATED A1C: CPT

## 2020-11-05 PROCEDURE — 11400000 HC PSYCH PRIVATE ROOM

## 2020-11-05 PROCEDURE — 36415 COLL VENOUS BLD VENIPUNCTURE: CPT

## 2020-11-05 RX ORDER — FOLIC ACID 1 MG/1
1 TABLET ORAL DAILY
Status: DISCONTINUED | OUTPATIENT
Start: 2020-11-06 | End: 2020-11-12 | Stop reason: HOSPADM

## 2020-11-05 RX ORDER — IBUPROFEN 200 MG
1 TABLET ORAL DAILY
Status: DISCONTINUED | OUTPATIENT
Start: 2020-11-06 | End: 2020-11-12 | Stop reason: HOSPADM

## 2020-11-05 RX ORDER — HYDROXYZINE PAMOATE 50 MG/1
50 CAPSULE ORAL EVERY 6 HOURS PRN
Status: DISCONTINUED | OUTPATIENT
Start: 2020-11-05 | End: 2020-11-12 | Stop reason: HOSPADM

## 2020-11-06 PROBLEM — N30.00 ACUTE CYSTITIS WITHOUT HEMATURIA: Status: ACTIVE | Noted: 2020-11-06

## 2020-11-06 PROBLEM — Z3A.16 16 WEEKS GESTATION OF PREGNANCY: Status: ACTIVE | Noted: 2020-11-06

## 2020-11-06 PROBLEM — F17.200 SMOKER: Status: ACTIVE | Noted: 2020-11-06

## 2020-11-06 LAB
CHOLEST SERPL-MCNC: 149 MG/DL (ref 120–199)
CHOLEST/HDLC SERPL: 2.9 {RATIO} (ref 2–5)
ESTIMATED AVG GLUCOSE: 103 MG/DL (ref 68–131)
HBA1C MFR BLD HPLC: 5.2 % (ref 4–5.6)
HDLC SERPL-MCNC: 52 MG/DL (ref 40–75)
HDLC SERPL: 34.9 % (ref 20–50)
LDLC SERPL CALC-MCNC: 79 MG/DL (ref 63–159)
NONHDLC SERPL-MCNC: 97 MG/DL
TRIGL SERPL-MCNC: 90 MG/DL (ref 30–150)

## 2020-11-06 PROCEDURE — 99233 PR SUBSEQUENT HOSPITAL CARE,LEVL III: ICD-10-PCS | Mod: TH,,, | Performed by: OBSTETRICS & GYNECOLOGY

## 2020-11-06 PROCEDURE — 36415 COLL VENOUS BLD VENIPUNCTURE: CPT

## 2020-11-06 PROCEDURE — 11400000 HC PSYCH PRIVATE ROOM

## 2020-11-06 PROCEDURE — 25000003 PHARM REV CODE 250: Performed by: NURSE PRACTITIONER

## 2020-11-06 PROCEDURE — 99232 PR SUBSEQUENT HOSPITAL CARE,LEVL II: ICD-10-PCS | Mod: ,,, | Performed by: NURSE PRACTITIONER

## 2020-11-06 PROCEDURE — 99232 SBSQ HOSP IP/OBS MODERATE 35: CPT | Mod: ,,, | Performed by: NURSE PRACTITIONER

## 2020-11-06 PROCEDURE — 90833 PR PSYCHOTHERAPY W/PATIENT W/E&M, 30 MIN (ADD ON): ICD-10-PCS | Mod: ,,, | Performed by: PSYCHIATRY & NEUROLOGY

## 2020-11-06 PROCEDURE — 25000003 PHARM REV CODE 250: Performed by: PSYCHIATRY & NEUROLOGY

## 2020-11-06 PROCEDURE — 99223 PR INITIAL HOSPITAL CARE,LEVL III: ICD-10-PCS | Mod: ,,, | Performed by: PSYCHIATRY & NEUROLOGY

## 2020-11-06 PROCEDURE — 99233 SBSQ HOSP IP/OBS HIGH 50: CPT | Mod: TH,,, | Performed by: OBSTETRICS & GYNECOLOGY

## 2020-11-06 PROCEDURE — 99223 1ST HOSP IP/OBS HIGH 75: CPT | Mod: ,,, | Performed by: PSYCHIATRY & NEUROLOGY

## 2020-11-06 PROCEDURE — 90833 PSYTX W PT W E/M 30 MIN: CPT | Mod: ,,, | Performed by: PSYCHIATRY & NEUROLOGY

## 2020-11-06 RX ORDER — PRENATAL WITH FERROUS FUM AND FOLIC ACID 3080; 920; 120; 400; 22; 1.84; 3; 20; 10; 1; 12; 200; 27; 25; 2 [IU]/1; [IU]/1; MG/1; [IU]/1; MG/1; MG/1; MG/1; MG/1; MG/1; MG/1; UG/1; MG/1; MG/1; MG/1; MG/1
1 TABLET ORAL DAILY
Status: DISCONTINUED | OUTPATIENT
Start: 2020-11-06 | End: 2020-11-12 | Stop reason: HOSPADM

## 2020-11-06 RX ORDER — LURASIDONE HYDROCHLORIDE 20 MG/1
20 TABLET, FILM COATED ORAL
Status: DISCONTINUED | OUTPATIENT
Start: 2020-11-06 | End: 2020-11-12 | Stop reason: HOSPADM

## 2020-11-06 RX ORDER — ASPIRIN 325 MG
50000 TABLET, DELAYED RELEASE (ENTERIC COATED) ORAL WEEKLY
Status: DISCONTINUED | OUTPATIENT
Start: 2020-11-06 | End: 2020-11-12 | Stop reason: HOSPADM

## 2020-11-06 RX ORDER — POTASSIUM CHLORIDE 20 MEQ/1
40 TABLET, EXTENDED RELEASE ORAL ONCE
Status: COMPLETED | OUTPATIENT
Start: 2020-11-06 | End: 2020-11-06

## 2020-11-06 RX ORDER — AMOXICILLIN AND CLAVULANATE POTASSIUM 875; 125 MG/1; MG/1
1 TABLET, FILM COATED ORAL EVERY 12 HOURS
Status: DISCONTINUED | OUTPATIENT
Start: 2020-11-06 | End: 2020-11-12 | Stop reason: HOSPADM

## 2020-11-06 RX ADMIN — OFLOXACIN 50000 UNITS: 300 TABLET, COATED ORAL at 01:11

## 2020-11-06 RX ADMIN — AMOXICILLIN AND CLAVULANATE POTASSIUM 1 TABLET: 875; 125 TABLET, FILM COATED ORAL at 08:11

## 2020-11-06 RX ADMIN — POTASSIUM CHLORIDE 40 MEQ: 1500 TABLET, EXTENDED RELEASE ORAL at 10:11

## 2020-11-06 RX ADMIN — PRENATAL VITAMINS-IRON FUMARATE 27 MG IRON-FOLIC ACID 0.8 MG TABLET 1 TABLET: at 01:11

## 2020-11-06 RX ADMIN — THERA TABS 1 TABLET: TAB at 08:11

## 2020-11-06 RX ADMIN — LURASIDONE HYDROCHLORIDE 20 MG: 20 TABLET, FILM COATED ORAL at 05:11

## 2020-11-06 RX ADMIN — FOLIC ACID 1 MG: 1 TABLET ORAL at 08:11

## 2020-11-06 NOTE — SUBJECTIVE & OBJECTIVE
Past Medical History:   Diagnosis Date    ADHD (attention deficit hyperactivity disorder)     Anxiety     Borderline personality disorder     Depression     Fatigue     Headache     History of psychiatric hospitalization     Hx of psychiatric care     Panic disorder     Psychiatric exam requested by authority     Psychiatric problem     PTSD (post-traumatic stress disorder)     Suicide attempt     4-5 by overdose and hanging     Therapy        No past surgical history on file.    Review of patient's allergies indicates:  No Known Allergies    No current facility-administered medications on file prior to encounter.      Current Outpatient Medications on File Prior to Encounter   Medication Sig    ascorbic acid, vitamin C, (VITAMIN C) 500 MG tablet Take 1 tablet (500 mg total) by mouth 2 (two) times daily. (Patient not taking: Reported on 6/24/2020)    calcium carbonate (TUMS) 200 mg calcium (500 mg) chewable tablet Take 1 tablet (500 mg total) by mouth once daily. or as directed on the bottle    cetirizine (ZYRTEC) 5 MG tablet Take 1 tablet (5 mg total) by mouth once daily. (Patient not taking: Reported on 6/24/2020)    cholecalciferol, vitamin D3, 1,250 mcg (50,000 unit) capsule Take 1 capsule (50,000 Units total) by mouth once a week.    ferrous gluconate (FERGON) 324 MG tablet Take 324 mg by mouth.    lurasidone (LATUDA) 40 mg Tab tablet Take 1 tablet (40 mg total) by mouth once daily.    methylPREDNISolone (MEDROL DOSEPACK) 4 mg tablet use as directed    ondansetron (ZOFRAN) 4 MG tablet Take 1 tablet (4 mg total) by mouth every 6 (six) hours.    prenatal vit,hector 74-iron-folic 27 mg iron- 1 mg Tab Take 1 tablet by mouth once daily.     Family History     Problem Relation (Age of Onset)    Bipolar disorder Maternal Grandmother    Diabetes Maternal Grandmother        Tobacco Use    Smoking status: Current Some Day Smoker     Packs/day: 0.00     Types: Cigarettes, Vaping with nicotine     Last  attempt to quit: 2019     Years since quittin.9    Smokeless tobacco: Never Used    Tobacco comment: quit vaping 2019   Substance and Sexual Activity    Alcohol use: Yes     Frequency: Never     Comment: occasionally    Drug use: Yes     Types: Marijuana     Comment: last marijuana use 2020    Sexual activity: Not Currently     Partners: Male     Birth control/protection: None     Comment: single     Review of Systems   Constitutional: Negative for chills and fever.   HENT: Negative for congestion and sore throat.    Respiratory: Negative for cough, chest tightness and shortness of breath.    Cardiovascular: Negative for chest pain, palpitations and leg swelling.   Gastrointestinal: Negative for abdominal pain, diarrhea, nausea and vomiting.   Genitourinary: Negative for flank pain, frequency and hematuria.   Musculoskeletal: Negative for back pain and neck pain.   Skin: Negative for rash and wound.   Neurological: Negative for dizziness, seizures, syncope and headaches.   Psychiatric/Behavioral: Positive for dysphoric mood and suicidal ideas. Negative for agitation, confusion and self-injury.     Objective:     Vital Signs (Most Recent):  Temp: 98.4 °F (36.9 °C) (20 0800)  Pulse: 92(Manual) (20 0800)  Resp: 18 (20 0800)  BP: 125/71 (20 0800) Vital Signs (24h Range):  Temp:  [98.1 °F (36.7 °C)-98.9 °F (37.2 °C)] 98.4 °F (36.9 °C)  Pulse:  [87-94] 92  Resp:  [18] 18  SpO2:  [100 %] 100 %  BP: (119-132)/(71-74) 125/71     Weight: 88.4 kg (194 lb 14.2 oz)  Body mass index is 30.52 kg/m².    Physical Exam  Vitals signs and nursing note reviewed.   Constitutional:       General: She is not in acute distress.     Appearance: She is well-developed.   HENT:      Head: Normocephalic and atraumatic.   Eyes:      Pupils: Pupils are equal, round, and reactive to light.   Neck:      Thyroid: No thyromegaly.   Cardiovascular:      Rate and Rhythm: Normal rate and regular rhythm.       Heart sounds: Normal heart sounds. No murmur.   Pulmonary:      Effort: Pulmonary effort is normal. No respiratory distress.      Breath sounds: Normal breath sounds. No wheezing or rales.   Abdominal:      General: Bowel sounds are normal. There is no distension.      Palpations: Abdomen is soft. There is no mass.      Tenderness: There is no abdominal tenderness.      Comments: Pregnant abdomen    Musculoskeletal: Normal range of motion.         General: No deformity.   Lymphadenopathy:      Cervical: No cervical adenopathy.   Skin:     General: Skin is warm and dry.      Findings: No erythema or rash.   Neurological:      Mental Status: She is alert and oriented to person, place, and time.      Comments: CN II visual fields full to confrontation  CN III, Iv, VI- pupils ERRL   CN III- no palsy  Nystagmus; none   Diplopia- none  ophthalmoparesis- none  CN V- facial sensation intact  CN VII- facial expression full and symmetric  CNVIII- normal  CN IV-Normal  CN X- normal  CN XI- Normal   CN XII normal      Psychiatric:         Behavior: Behavior normal.         Thought Content: Thought content normal.         Judgment: Judgment normal.         Significant Labs:   UPT  Results for orders placed or performed during the hospital encounter of 08/28/20   POCT urine pregnancy   Result Value Ref Range    POC Preg Test, Ur Positive (A) Negative     Acceptable Yes      U/A- Moderate bacteria, WBC 50, RBC 2,   Trace leukocytes   Urine Cx in process  9-26/2020 urine Cx- candida   UDS  Results for orders placed or performed during the hospital encounter of 11/05/20   Drug screen panel, emergency   Result Value Ref Range    Benzodiazepines Negative     Methadone metabolites Negative     Cocaine (Metab.) Negative     Opiate Scrn, Ur Negative     Barbiturate Screen, Ur Negative     Amphetamine Screen, Ur Negative     THC Negative     Phencyclidine Negative     Creatinine, Urine 194.8 15.0 - 325.0 mg/dL    Toxicology  Information SEE COMMENT      CBC  Results for orders placed or performed during the hospital encounter of 11/05/20   CBC Auto Differential   Result Value Ref Range    WBC 5.95 3.90 - 12.70 K/uL    RBC 3.92 (L) 4.00 - 5.40 M/uL    Hemoglobin 11.0 (L) 12.0 - 16.0 g/dL    Hematocrit 33.3 (L) 37.0 - 48.5 %    MCV 85 82 - 98 fL    MCH 28.1 27.0 - 31.0 pg    MCHC 33.0 32.0 - 36.0 g/dL    RDW 12.8 11.5 - 14.5 %    Platelets 265 150 - 350 K/uL    MPV 10.6 9.2 - 12.9 fL    Immature Granulocytes 0.2 0.0 - 0.5 %    Gran # (ANC) 4.4 1.8 - 7.7 K/uL    Immature Grans (Abs) 0.01 0.00 - 0.04 K/uL    Lymph # 1.1 1.0 - 4.8 K/uL    Mono # 0.3 0.3 - 1.0 K/uL    Eos # 0.1 0.0 - 0.5 K/uL    Baso # 0.01 0.00 - 0.20 K/uL    nRBC 0 0 /100 WBC    Gran % 74.2 (H) 38.0 - 73.0 %    Lymph % 18.7 18.0 - 48.0 %    Mono % 5.7 4.0 - 15.0 %    Eosinophil % 1.0 0.0 - 8.0 %    Basophil % 0.2 0.0 - 1.9 %    Differential Method Automated      CMP  Results for orders placed or performed during the hospital encounter of 11/05/20   Comprehensive Metabolic Panel   Result Value Ref Range    Sodium 138 136 - 145 mmol/L    Potassium 3.2 (L) 3.5 - 5.1 mmol/L    Chloride 107 95 - 110 mmol/L    CO2 21 (L) 23 - 29 mmol/L    Glucose 86 70 - 110 mg/dL    BUN 5 (L) 6 - 20 mg/dL    Creatinine 0.6 0.5 - 1.4 mg/dL    Calcium 9.2 8.7 - 10.5 mg/dL    Total Protein 7.0 6.0 - 8.4 g/dL    Albumin 3.2 (L) 3.5 - 5.2 g/dL    Total Bilirubin 0.2 0.1 - 1.0 mg/dL    Alkaline Phosphatase 45 (L) 55 - 135 U/L    AST 14 10 - 40 U/L    ALT 23 10 - 44 U/L    Anion Gap 10 8 - 16 mmol/L    eGFR if African American >60 >60 mL/min/1.73 m^2    eGFR if non African American >60 >60 mL/min/1.73 m^2     TSH  Results for orders placed or performed during the hospital encounter of 11/05/20   TSH   Result Value Ref Range    TSH <0.010 (L) 0.400 - 4.000 uIU/mL   free T4 1.24, free T3- 4.1 normal   ETOH  Results for orders placed or performed during the hospital encounter of 11/05/20   Ethanol    Result Value Ref Range    Alcohol, Serum <10 <10 mg/dL     Salicylate  Results for orders placed or performed during the hospital encounter of 11/05/20   Salicylate Level   Result Value Ref Range    Salicylate Lvl <5.0 (L) 15.0 - 30.0 mg/dL     Acetaminophen  Results for orders placed or performed during the hospital encounter of 11/05/20   Acetaminophen Level   Result Value Ref Range    Acetaminophen (Tylenol), Serum <3.0 (L) 10.0 - 20.0 ug/mL     Lab Results   Component Value Date    HGBA1C 5.2 11/05/2020   chol 149, TG- 90, HDL- 52, LDL 79     Covid negative   Vitamin D 24-     Significant Imaging:

## 2020-11-06 NOTE — PLAN OF CARE
Behavior:  The patient attended psychotherapy group today. She was dressed in her own clothes and wearing a mask.    Intervention:  Personal values were discussed in psychotherapy group this date and how personal values plays a part in patients' lives. Patients identified values that are important to them using handout P/C/P - 7.1 from the Group Therapy for Substance Abuse, second edition, 2016. A Stages of Change Manual. Group discussion was had about patients' values, substance abuse behaviors that may not line up with their values, and how they might make some changes.    Response:  The patient stated that her most important values are loyalty, family, strong, caring, and her unborn baby.    Plan:   To continue to encourage patient to attend group psychotherapy and to learn more about ways that they might change to live in more accord with their values.

## 2020-11-06 NOTE — PLAN OF CARE
Lying quietly in bed, eyes closed, respirations even, unlabored. Apparently asleep. Slept 6.5 hours thus far with one interruption. Safety precautions maintained. Rounds done every 15 minutes. Bed is fixed in low position and room is uncluttered and pathways are clear.
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
## 2020-11-06 NOTE — PROGRESS NOTES
"[facility]  Adult Nutrition  Progress Note    SUMMARY       Recommendations    Recommendations:  1. Continue current diet   2. Continue current MVI and folic acid supplements.  3. Please encourage pt. Intake of at least snacks BID for increased needs with pregnancy.  4. Recommend weekly wt.s  5. RD will follow    Goals: Pt. will continue to meet at least 75% ENN via meals by next RD follow up.  Nutrition Goal Status: new  Communication of RD Recs: (Documented in POC)    Reason for Assessment    Reason For Assessment: consult  Diagnosis: psychological disorder  Relevant Medical History: suicidal ideation, depression, pregnancy  Interdisciplinary Rounds: did not attend    General Information Comments: NFPE not performed per psych status. Noted wt. difference in chart of -96lbs in 3 months. Accuracy of wt. seemingly inaccurate. Pt.'s current intake is 75%. Pt. is currently not at risk of malnutrtion. Will continue to monitor pt.'s wt. Changes.    Nutrition Discharge Planning: Regular diet    Nutrition Risk Screen    Nutrition Risk Screen: no indicators present    Anthropometrics    Temp: 98.4 °F (36.9 °C)  Height Method: Stated  Height: 5' 7" (170.2 cm)  Height (inches): 67 in  Weight Method: Standard Scale  Weight: 88.4 kg (194 lb 14.2 oz)  Weight (lb): 194.89 lb  Ideal Body Weight (IBW), Female: 135 lb  % Ideal Body Weight, Female (lb): 144.36 %  BMI (Calculated): 30.5  BMI Grade: 30 - 34.9- obesity - grade I  Weight Change Amount: 96 lb(in 3 months; wt. in chart seemingly inaccurate)       Lab/Procedures/Meds    Pertinent Labs Reviewed: reviewed  Pertinent Labs Comments: Hgb 11(L), Hct. 33.3(L), potassium 3.2(L), CO2 21(L), BUN 5(L), alkaline phos. 45(L), alb. 3.2(L), vit. D 24(L)  Pertinent Medications Reviewed: reviewed  Pertinent Medications Comments: folic acid, MVI    Estimated/Assessed Needs    Weight Used For Calorie Calculations: 88.4 kg (194 lb 14.2 oz)(ABW)  Energy Calorie Requirements " (kcal): 2540kcals  Energy Need Method: other (see comments)(2200kcals + 340kcals for pregnancy guidelines)  Protein Requirements: 122g(1.1g/kg ABW + 25g for second trimester-pregnancy guidelines)  Weight Used For Protein Calculations: 88.4 kg (194 lb 14.2 oz)(ABW)  Fluid Requirements (mL): 2540mL  Estimated Fluid Requirement Method: RDA Method(1mL/kcal)  RDA Method (mL): 2540       Nutrition Prescription Ordered    Current Diet Order: Regular diet    Evaluation of Received Nutrient/Fluid Intake     % Intake of Estimated Energy Needs: 50 - 75 %  % Meal Intake: 75 - 100 %    Nutrition Risk    Level of Risk/Frequency of Follow-up: low     Assessment and Plan  Nutrition Problem  Increased nutrient needs-calories    Related to (etiology):   pregnancy    Signs and Symptoms (as evidenced by):   Pt. At 16 weeks of pregnancy and pregnancy nutrition guidelines    Interventions:  Collaboration with other providers  General healthful diet    Nutrition Diagnosis Status:   New        Monitor and Evaluation    Food and Nutrient Intake: energy intake, food and beverage intake  Food and Nutrient Adminstration: diet order  Physical Activity and Function: nutrition-related ADLs and IADLs(pregnancy)  Anthropometric Measurements: weight, weight change, body mass index  Biochemical Data, Medical Tests and Procedures: electrolyte and renal panel, gastrointestinal profile, glucose/endocrine profile, inflammatory profile, lipid profile  Nutrition-Focused Physical Findings: overall appearance     Malnutrition Assessment  RD to monitor wt. Changes    Nutrition Follow-Up    RD Follow-up?: Yes

## 2020-11-06 NOTE — PROGRESS NOTES
"   11/06/20 1040   [facility] Group Therapy   Group Name Therapeutic Recreation   Specific Interventions Cognitive Stimulation Training   Participation Level Minimal   Participation Quality Requires Prompting   Insight/Motivation Applies New Skills;Improved   Affect/Mood Display Depressed   Cognition Alert   Psychomotor WNL   Patient reports was quiet unless approached, left early due to doctor's visit. Patient states "all I do is watch TV, I stay to myself, listen to music or be on my youtube on my phone.  "

## 2020-11-06 NOTE — SUBJECTIVE & OBJECTIVE
Obstetric HPI:  Patient reports None contractions, Not yet noted fetal movement, No vaginal bleeding , No loss of fluid     This pregnancy has been complicated by be [facility] admission    OB History    Para Term  AB Living   2 0 0 0 1 0   SAB TAB Ectopic Multiple Live Births   1 0 0 0 0      # Outcome Date GA Lbr Chuck/2nd Weight Sex Delivery Anes PTL Lv   2 Current            2015             Past Medical History:   Diagnosis Date    ADHD (attention deficit hyperactivity disorder)     Anxiety     Borderline personality disorder     Depression     Fatigue     Headache     History of psychiatric hospitalization     Hx of psychiatric care     Panic disorder     Psychiatric exam requested by authority     Psychiatric problem     PTSD (post-traumatic stress disorder)     Suicide attempt     4-5 by overdose and hanging     Therapy      No past surgical history on file.    PTA Medications   Medication Sig    lurasidone (LATUDA) 40 mg Tab tablet Take 1 tablet (40 mg total) by mouth once daily.    ascorbic acid, vitamin C, (VITAMIN C) 500 MG tablet Take 1 tablet (500 mg total) by mouth 2 (two) times daily. (Patient not taking: Reported on 2020)    calcium carbonate (TUMS) 200 mg calcium (500 mg) chewable tablet Take 1 tablet (500 mg total) by mouth once daily. or as directed on the bottle    cetirizine (ZYRTEC) 5 MG tablet Take 1 tablet (5 mg total) by mouth once daily. (Patient not taking: Reported on 2020)    cholecalciferol, vitamin D3, 1,250 mcg (50,000 unit) capsule Take 1 capsule (50,000 Units total) by mouth once a week.    ferrous gluconate (FERGON) 324 MG tablet Take 324 mg by mouth.    methylPREDNISolone (MEDROL DOSEPACK) 4 mg tablet use as directed    ondansetron (ZOFRAN) 4 MG tablet Take 1 tablet (4 mg total) by mouth every 6 (six) hours.    prenatal vit,hector 74-iron-folic 27 mg iron- 1 mg Tab Take 1 tablet by mouth once daily.       Review of patient's allergies  indicates:  No Known Allergies     Family History     Problem Relation (Age of Onset)    Bipolar disorder Maternal Grandmother    Diabetes Maternal Grandmother        Tobacco Use    Smoking status: Current Some Day Smoker     Packs/day: 0.00     Types: Cigarettes, Vaping with nicotine     Last attempt to quit: 2019     Years since quittin.9    Smokeless tobacco: Never Used    Tobacco comment: quit vaping 2019   Substance and Sexual Activity    Alcohol use: Yes     Frequency: Never     Comment: occasionally    Drug use: Yes     Types: Marijuana     Comment: last marijuana use 2020    Sexual activity: Not Currently     Partners: Male     Birth control/protection: None     Comment: single     Review of Systems   All other systems reviewed and are negative.     Objective:     Vital Signs (Most Recent):  Temp: 98.9 °F (37.2 °C) (20 1500)  Pulse: 94 (20 1500)  Resp: 20 (20 1500)  BP: 128/67 (20 1500) Vital Signs (24h Range):  Temp:  [98.1 °F (36.7 °C)-98.9 °F (37.2 °C)] 98.9 °F (37.2 °C)  Pulse:  [87-94] 94  Resp:  [18-20] 20  SpO2:  [100 %] 100 %  BP: (119-132)/(67-74) 128/67     Weight: 88.4 kg (194 lb 14.2 oz)  Body mass index is 30.52 kg/m².    FHT:  140s      Physical Exam:   Constitutional: She appears well-developed and well-nourished.                                   Significant Labs:  Lab Results   Component Value Date    [facility] POS 2020       I have personallly reviewed all pertinent lab results from the last 24 hours.
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
## 2020-11-06 NOTE — PLAN OF CARE
Care plan reviewed, patient agrees with plan. States feels tired and depressed but has no suicidal ideations or homicidal ideations.  Medication compliant, accepts meals and snacks.  Gait steady, no falls. Discussed patient's individualized safety plan, effective coping skills, mood improvement, improved sleep patterns and resolution of depression and suicidal ideations. Will continue to monitor for safety.

## 2020-11-06 NOTE — HPI
11/6/2020  Tammy Lopez is a 20 y.o. female,  16 week pregnant female who presents on PEC secondary to SI. + hx of Depression , SI and PTSD; Pt endorses worsening depression and increased life stressors since learning of her pregnancy.  Was admitted to [facility] on PEC for suicidal ideation 10/22/2020 , discharged home after tx 11/3/2020   Reported to staff she hasn't taken her latuda in over a week because it makes her vomit

## 2020-11-06 NOTE — ASSESSMENT & PLAN NOTE
1 month ago had proteus mirabilis UTI- C&S reviewed  Pt denies urine symptoms today   Will start augmentin bid x 7d

## 2020-11-06 NOTE — CONSULTS
[facility] Hospital Medicine  Consult Note    Patient Name: Tammy Lopez  MRN: [id number]  Admission Date: 11/5/2020  Hospital Length of Stay: 1 days  Attending Physician: Sarath Car MD   Primary Care Provider: Sherice Ross NP           Patient information was obtained from patient, past medical records and ER records.     Inpatient consult to [facility] for History and Physical  Consult performed by: Haylee Sheriff NP  Consult ordered by: Sarath Car MD        Subjective:     Principal Problem: <principal problem not specified>    Chief Complaint: No chief complaint on file.       HPI: 11/6/2020  Tammy Lopez is a 20 y.o. female,  16 week pregnant female who presents on PEC secondary to SI. + hx of Depression , SI and PTSD; Pt endorses worsening depression and increased life stressors since learning of her pregnancy.  Was admitted to [facility] on PEC for suicidal ideation 10/22/2020 , discharged home after tx 11/3/2020   Reported to staff she hasn't taken her latuda in over a week because it makes her vomit    Past Medical History:   Diagnosis Date    ADHD (attention deficit hyperactivity disorder)     Anxiety     Borderline personality disorder     Depression     Fatigue     Headache     History of psychiatric hospitalization     Hx of psychiatric care     Panic disorder     Psychiatric exam requested by authority     Psychiatric problem     PTSD (post-traumatic stress disorder)     Suicide attempt     4-5 by overdose and hanging     Therapy        No past surgical history on file.    Review of patient's allergies indicates:  No Known Allergies    No current facility-administered medications on file prior to encounter.      Current Outpatient Medications on File Prior to Encounter   Medication Sig    ascorbic acid, vitamin C, (VITAMIN C) 500 MG tablet Take 1 tablet (500 mg total) by mouth 2 (two) times daily.  (Patient not taking: Reported on 2020)    calcium carbonate (TUMS) 200 mg calcium (500 mg) chewable tablet Take 1 tablet (500 mg total) by mouth once daily. or as directed on the bottle    cetirizine (ZYRTEC) 5 MG tablet Take 1 tablet (5 mg total) by mouth once daily. (Patient not taking: Reported on 2020)    cholecalciferol, vitamin D3, 1,250 mcg (50,000 unit) capsule Take 1 capsule (50,000 Units total) by mouth once a week.    ferrous gluconate (FERGON) 324 MG tablet Take 324 mg by mouth.    lurasidone (LATUDA) 40 mg Tab tablet Take 1 tablet (40 mg total) by mouth once daily.    methylPREDNISolone (MEDROL DOSEPACK) 4 mg tablet use as directed    ondansetron (ZOFRAN) 4 MG tablet Take 1 tablet (4 mg total) by mouth every 6 (six) hours.    prenatal vit,hector 74-iron-folic 27 mg iron- 1 mg Tab Take 1 tablet by mouth once daily.     Family History     Problem Relation (Age of Onset)    Bipolar disorder Maternal Grandmother    Diabetes Maternal Grandmother        Tobacco Use    Smoking status: Current Some Day Smoker     Packs/day: 0.00     Types: Cigarettes, Vaping with nicotine     Last attempt to quit: 2019     Years since quittin.9    Smokeless tobacco: Never Used    Tobacco comment: quit vaping 2019   Substance and Sexual Activity    Alcohol use: Yes     Frequency: Never     Comment: occasionally    Drug use: Yes     Types: Marijuana     Comment: last marijuana use 2020    Sexual activity: Not Currently     Partners: Male     Birth control/protection: None     Comment: single     Review of Systems   Constitutional: Negative for chills and fever.   HENT: Negative for congestion and sore throat.    Respiratory: Negative for cough, chest tightness and shortness of breath.    Cardiovascular: Negative for chest pain, palpitations and leg swelling.   Gastrointestinal: Negative for abdominal pain, diarrhea, nausea and vomiting.   Genitourinary: Negative for flank pain, frequency and  hematuria.   Musculoskeletal: Negative for back pain and neck pain.   Skin: Negative for rash and wound.   Neurological: Negative for dizziness, seizures, syncope and headaches.   Psychiatric/Behavioral: Positive for dysphoric mood and suicidal ideas. Negative for agitation, confusion and self-injury.     Objective:     Vital Signs (Most Recent):  Temp: 98.4 °F (36.9 °C) (11/06/20 0800)  Pulse: 92(Manual) (11/06/20 0800)  Resp: 18 (11/06/20 0800)  BP: 125/71 (11/06/20 0800) Vital Signs (24h Range):  Temp:  [98.1 °F (36.7 °C)-98.9 °F (37.2 °C)] 98.4 °F (36.9 °C)  Pulse:  [87-94] 92  Resp:  [18] 18  SpO2:  [100 %] 100 %  BP: (119-132)/(71-74) 125/71     Weight: 88.4 kg (194 lb 14.2 oz)  Body mass index is 30.52 kg/m².    Physical Exam  Vitals signs and nursing note reviewed.   Constitutional:       General: She is not in acute distress.     Appearance: She is well-developed.   HENT:      Head: Normocephalic and atraumatic.   Eyes:      Pupils: Pupils are equal, round, and reactive to light.   Neck:      Thyroid: No thyromegaly.   Cardiovascular:      Rate and Rhythm: Normal rate and regular rhythm.      Heart sounds: Normal heart sounds. No murmur.   Pulmonary:      Effort: Pulmonary effort is normal. No respiratory distress.      Breath sounds: Normal breath sounds. No wheezing or rales.   Abdominal:      General: Bowel sounds are normal. There is no distension.      Palpations: Abdomen is soft. There is no mass.      Tenderness: There is no abdominal tenderness.      Comments: Pregnant abdomen    Musculoskeletal: Normal range of motion.         General: No deformity.   Lymphadenopathy:      Cervical: No cervical adenopathy.   Skin:     General: Skin is warm and dry.      Findings: No erythema or rash.   Neurological:      Mental Status: She is alert and oriented to person, place, and time.      Comments: CN II visual fields full to confrontation  CN III, Iv, VI- pupils ERRL   CN III- no palsy  Nystagmus; none    Diplopia- none  ophthalmoparesis- none  CN V- facial sensation intact  CN VII- facial expression full and symmetric  CNVIII- normal  CN IV-Normal  CN X- normal  CN XI- Normal   CN XII normal      Psychiatric:         Behavior: Behavior normal.         Thought Content: Thought content normal.         Judgment: Judgment normal.         Significant Labs:   UPT  Results for orders placed or performed during the hospital encounter of 08/28/20   POCT urine pregnancy   Result Value Ref Range    POC Preg Test, Ur Positive (A) Negative     Acceptable Yes      U/A- Moderate bacteria, WBC 50, RBC 2,   Trace leukocytes   Urine Cx in process  9-26/2020 urine Cx- candida   UDS  Results for orders placed or performed during the hospital encounter of 11/05/20   Drug screen panel, emergency   Result Value Ref Range    Benzodiazepines Negative     Methadone metabolites Negative     Cocaine (Metab.) Negative     Opiate Scrn, Ur Negative     Barbiturate Screen, Ur Negative     Amphetamine Screen, Ur Negative     THC Negative     Phencyclidine Negative     Creatinine, Urine 194.8 15.0 - 325.0 mg/dL    Toxicology Information SEE COMMENT      CBC  Results for orders placed or performed during the hospital encounter of 11/05/20   CBC Auto Differential   Result Value Ref Range    WBC 5.95 3.90 - 12.70 K/uL    RBC 3.92 (L) 4.00 - 5.40 M/uL    Hemoglobin 11.0 (L) 12.0 - 16.0 g/dL    Hematocrit 33.3 (L) 37.0 - 48.5 %    MCV 85 82 - 98 fL    MCH 28.1 27.0 - 31.0 pg    MCHC 33.0 32.0 - 36.0 g/dL    RDW 12.8 11.5 - 14.5 %    Platelets 265 150 - 350 K/uL    MPV 10.6 9.2 - 12.9 fL    Immature Granulocytes 0.2 0.0 - 0.5 %    Gran # (ANC) 4.4 1.8 - 7.7 K/uL    Immature Grans (Abs) 0.01 0.00 - 0.04 K/uL    Lymph # 1.1 1.0 - 4.8 K/uL    Mono # 0.3 0.3 - 1.0 K/uL    Eos # 0.1 0.0 - 0.5 K/uL    Baso # 0.01 0.00 - 0.20 K/uL    nRBC 0 0 /100 WBC    Gran % 74.2 (H) 38.0 - 73.0 %    Lymph % 18.7 18.0 - 48.0 %    Mono % 5.7 4.0 - 15.0 %     Eosinophil % 1.0 0.0 - 8.0 %    Basophil % 0.2 0.0 - 1.9 %    Differential Method Automated      CMP  Results for orders placed or performed during the hospital encounter of 11/05/20   Comprehensive Metabolic Panel   Result Value Ref Range    Sodium 138 136 - 145 mmol/L    Potassium 3.2 (L) 3.5 - 5.1 mmol/L    Chloride 107 95 - 110 mmol/L    CO2 21 (L) 23 - 29 mmol/L    Glucose 86 70 - 110 mg/dL    BUN 5 (L) 6 - 20 mg/dL    Creatinine 0.6 0.5 - 1.4 mg/dL    Calcium 9.2 8.7 - 10.5 mg/dL    Total Protein 7.0 6.0 - 8.4 g/dL    Albumin 3.2 (L) 3.5 - 5.2 g/dL    Total Bilirubin 0.2 0.1 - 1.0 mg/dL    Alkaline Phosphatase 45 (L) 55 - 135 U/L    AST 14 10 - 40 U/L    ALT 23 10 - 44 U/L    Anion Gap 10 8 - 16 mmol/L    eGFR if African American >60 >60 mL/min/1.73 m^2    eGFR if non African American >60 >60 mL/min/1.73 m^2     TSH  Results for orders placed or performed during the hospital encounter of 11/05/20   TSH   Result Value Ref Range    TSH <0.010 (L) 0.400 - 4.000 uIU/mL   free T4 1.24, free T3- 4.1 normal   ETOH  Results for orders placed or performed during the hospital encounter of 11/05/20   Ethanol   Result Value Ref Range    Alcohol, Serum <10 <10 mg/dL     Salicylate  Results for orders placed or performed during the hospital encounter of 11/05/20   Salicylate Level   Result Value Ref Range    Salicylate Lvl <5.0 (L) 15.0 - 30.0 mg/dL     Acetaminophen  Results for orders placed or performed during the hospital encounter of 11/05/20   Acetaminophen Level   Result Value Ref Range    Acetaminophen (Tylenol), Serum <3.0 (L) 10.0 - 20.0 ug/mL     Lab Results   Component Value Date    HGBA1C 5.2 11/05/2020   chol 149, TG- 90, HDL- 52, LDL 79     Covid negative   Vitamin D 24-     Significant Imaging:     Assessment/Plan:     Acute cystitis without hematuria  1 month ago had proteus mirabilis UTI- C&S reviewed  Pt denies urine symptoms today   Will start augmentin bid x 7d       Smoker  Nicotine patch prn       16  weeks gestation of pregnancy        Depression with suicidal ideation  Per psychiatry         VTE Risk Mitigation (From admission, onward)    None              Thank you for your consult. I will sign off. Please contact us if you have any additional questions.    Haylee Sheriff NP  Department of Hospital Medicine   [facility]

## 2020-11-06 NOTE — PLAN OF CARE
Recommendations:  1. Continue current diet   2. Continue current MVI and folic acid supplements.  3. Please encourage pt. Intake of at least snacks BID for increased needs with pregnancy.  4. Recommend weekly wt.s  5. RD will follow    Goals: Pt. will continue to meet at least 75% ENN via meals by next RD follow up.  Nutrition Goal Status: new    Interventions:  Collaboration with other providers  General healthful diet    Nutrition Discharge Planning: Regular diet

## 2020-11-06 NOTE — HPI
Patient admitted to [facility] for psychiatric evaluation.  Patient is 16 weeks pregnant and has had prenatal care at another clinic.  Patient states this is her 3rd pregnancy with 2 previous miscarriages.  Patient denies noting fetal movement yet.  She has not had any vaginal bleeding.  She denies any contractions or ruptured membranes.  Patient's medications were reviewed and all appeared to be saved in pregnancy.

## 2020-11-06 NOTE — CONSULTS
[facility]  Obstetrics  Consult Note    Patient Name: Tammy Lopez  MRN: [id number]  Admission Date: 2020  Hospital Length of Stay: 1 days  Code Status: Full Code  Primary Care Provider: Sherice Ross NP  Principal Problem: Severe episode of recurrent major depressive disorder, without psychotic features    Consults  Subjective:     Principal Problem:Severe episode of recurrent major depressive disorder, without psychotic features    History of Present Illness:  Patient admitted to [facility] for psychiatric evaluation.  Patient is 16 weeks pregnant and has had prenatal care at another clinic.  Patient states this is her 3rd pregnancy with 2 previous miscarriages.  Patient denies noting fetal movement yet.  She has not had any vaginal bleeding.  She denies any contractions or ruptured membranes.  Patient's medications were reviewed and all appeared to be saved in pregnancy.    Obstetric HPI:  Patient reports None contractions, Not yet noted fetal movement, No vaginal bleeding , No loss of fluid     This pregnancy has been complicated by be [facility] admission    OB History    Para Term  AB Living   2 0 0 0 1 0   SAB TAB Ectopic Multiple Live Births   1 0 0 0 0      # Outcome Date GA Lbr Chuck/2nd Weight Sex Delivery Anes PTL Lv   2 Current            2015             Past Medical History:   Diagnosis Date    ADHD (attention deficit hyperactivity disorder)     Anxiety     Borderline personality disorder     Depression     Fatigue     Headache     History of psychiatric hospitalization     Hx of psychiatric care     Panic disorder     Psychiatric exam requested by authority     Psychiatric problem     PTSD (post-traumatic stress disorder)     Suicide attempt     4-5 by overdose and hanging     Therapy      No past surgical history on file.    PTA Medications   Medication Sig    lurasidone (LATUDA) 40 mg Tab tablet Take 1 tablet (40 mg total) by mouth once  daily.    ascorbic acid, vitamin C, (VITAMIN C) 500 MG tablet Take 1 tablet (500 mg total) by mouth 2 (two) times daily. (Patient not taking: Reported on 2020)    calcium carbonate (TUMS) 200 mg calcium (500 mg) chewable tablet Take 1 tablet (500 mg total) by mouth once daily. or as directed on the bottle    cetirizine (ZYRTEC) 5 MG tablet Take 1 tablet (5 mg total) by mouth once daily. (Patient not taking: Reported on 2020)    cholecalciferol, vitamin D3, 1,250 mcg (50,000 unit) capsule Take 1 capsule (50,000 Units total) by mouth once a week.    ferrous gluconate (FERGON) 324 MG tablet Take 324 mg by mouth.    methylPREDNISolone (MEDROL DOSEPACK) 4 mg tablet use as directed    ondansetron (ZOFRAN) 4 MG tablet Take 1 tablet (4 mg total) by mouth every 6 (six) hours.    prenatal vit,hector 74-iron-folic 27 mg iron- 1 mg Tab Take 1 tablet by mouth once daily.       Review of patient's allergies indicates:  No Known Allergies     Family History     Problem Relation (Age of Onset)    Bipolar disorder Maternal Grandmother    Diabetes Maternal Grandmother        Tobacco Use    Smoking status: Current Some Day Smoker     Packs/day: 0.00     Types: Cigarettes, Vaping with nicotine     Last attempt to quit: 2019     Years since quittin.9    Smokeless tobacco: Never Used    Tobacco comment: quit vaping 2019   Substance and Sexual Activity    Alcohol use: Yes     Frequency: Never     Comment: occasionally    Drug use: Yes     Types: Marijuana     Comment: last marijuana use 2020    Sexual activity: Not Currently     Partners: Male     Birth control/protection: None     Comment: single     Review of Systems   All other systems reviewed and are negative.     Objective:     Vital Signs (Most Recent):  Temp: 98.9 °F (37.2 °C) (20 1500)  Pulse: 94 (20 1500)  Resp: 20 (20 1500)  BP: 128/67 (20 1500) Vital Signs (24h Range):  Temp:  [98.1 °F (36.7 °C)-98.9 °F (37.2 °C)]  98.9 °F (37.2 °C)  Pulse:  [87-94] 94  Resp:  [18-20] 20  SpO2:  [100 %] 100 %  BP: (119-132)/(67-74) 128/67     Weight: 88.4 kg (194 lb 14.2 oz)  Body mass index is 30.52 kg/m².    FHT:  140s      Physical Exam:   Constitutional: She appears well-developed and well-nourished.                                   Significant Labs:  Lab Results   Component Value Date    [facility] POS 2020       I have personallly reviewed all pertinent lab results from the last 24 hours.    Assessment/Plan:     20 y.o. female  at 15w2d for:    16 weeks gestation of pregnancy  Appropriate fetal heart tones and no significant pregnancy risk        Thank you for your consult. I will sign off. Please contact us if you have any additional questions.    Flex Arteaga MD  Obstetrics & Gynecology  [facility]

## 2020-11-07 PROCEDURE — 11400000 HC PSYCH PRIVATE ROOM

## 2020-11-07 PROCEDURE — 25000003 PHARM REV CODE 250: Performed by: PSYCHIATRY & NEUROLOGY

## 2020-11-07 PROCEDURE — S4991 NICOTINE PATCH NONLEGEND: HCPCS | Performed by: PSYCHIATRY & NEUROLOGY

## 2020-11-07 PROCEDURE — 99233 SBSQ HOSP IP/OBS HIGH 50: CPT | Mod: ,,, | Performed by: PSYCHIATRY & NEUROLOGY

## 2020-11-07 PROCEDURE — 99233 PR SUBSEQUENT HOSPITAL CARE,LEVL III: ICD-10-PCS | Mod: ,,, | Performed by: PSYCHIATRY & NEUROLOGY

## 2020-11-07 PROCEDURE — 90833 PR PSYCHOTHERAPY W/PATIENT W/E&M, 30 MIN (ADD ON): ICD-10-PCS | Mod: ,,, | Performed by: PSYCHIATRY & NEUROLOGY

## 2020-11-07 PROCEDURE — 90833 PSYTX W PT W E/M 30 MIN: CPT | Mod: ,,, | Performed by: PSYCHIATRY & NEUROLOGY

## 2020-11-07 RX ADMIN — LURASIDONE HYDROCHLORIDE 20 MG: 20 TABLET, FILM COATED ORAL at 04:11

## 2020-11-07 RX ADMIN — FOLIC ACID 1 MG: 1 TABLET ORAL at 08:11

## 2020-11-07 RX ADMIN — PRENATAL VITAMINS-IRON FUMARATE 27 MG IRON-FOLIC ACID 0.8 MG TABLET 1 TABLET: at 08:11

## 2020-11-07 RX ADMIN — AMOXICILLIN AND CLAVULANATE POTASSIUM 1 TABLET: 875; 125 TABLET, FILM COATED ORAL at 08:11
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
## 2020-11-07 NOTE — PLAN OF CARE
Shift note : patient is cooperative . She is eating all meals and is taking all ordered medications . Less suicidal ideations . Patient is in the day room with staff and peers .

## 2020-11-07 NOTE — PLAN OF CARE
Patient in dayroom most of shift, talking to peers, calm and cooperative, vs stable, appetite good, taking medication as ordered. Promoted safety plan, effective coping skills, mood improvement, absence of SI/HI will continue to monitor safety.

## 2020-11-07 NOTE — PLAN OF CARE
Pt has slept 7 hours without any interruptions.  NAD.  Resp even & unlabored.  Pathways clear and bed is low.  Q 15 minute safety checks ongoing.  All precautions maintained.

## 2020-11-08 PROCEDURE — 99233 SBSQ HOSP IP/OBS HIGH 50: CPT | Mod: ,,, | Performed by: PSYCHIATRY & NEUROLOGY

## 2020-11-08 PROCEDURE — 90833 PSYTX W PT W E/M 30 MIN: CPT | Mod: ,,, | Performed by: PSYCHIATRY & NEUROLOGY

## 2020-11-08 PROCEDURE — 11400000 HC PSYCH PRIVATE ROOM

## 2020-11-08 PROCEDURE — 99233 PR SUBSEQUENT HOSPITAL CARE,LEVL III: ICD-10-PCS | Mod: ,,, | Performed by: PSYCHIATRY & NEUROLOGY

## 2020-11-08 PROCEDURE — 90833 PR PSYCHOTHERAPY W/PATIENT W/E&M, 30 MIN (ADD ON): ICD-10-PCS | Mod: ,,, | Performed by: PSYCHIATRY & NEUROLOGY

## 2020-11-08 PROCEDURE — 25000003 PHARM REV CODE 250: Performed by: PSYCHIATRY & NEUROLOGY

## 2020-11-08 RX ADMIN — FOLIC ACID 1 MG: 1 TABLET ORAL at 08:11

## 2020-11-08 RX ADMIN — LURASIDONE HYDROCHLORIDE 20 MG: 20 TABLET, FILM COATED ORAL at 04:11

## 2020-11-08 RX ADMIN — AMOXICILLIN AND CLAVULANATE POTASSIUM 1 TABLET: 875; 125 TABLET, FILM COATED ORAL at 08:11

## 2020-11-08 RX ADMIN — PRENATAL VITAMINS-IRON FUMARATE 27 MG IRON-FOLIC ACID 0.8 MG TABLET 1 TABLET: at 08:11

## 2020-11-08 NOTE — PLAN OF CARE
Pt has slept 5 hours so far with 2 interruptions.  NAD.  Resp even & unlabored.  Pathways clear and bed is low.  Q 15 minute safety checks ongoing.  All precautions maintained.

## 2020-11-08 NOTE — PLAN OF CARE
Up and about the unit.  Spending time in the dayroom watching TV with peers.  Calm and cooperative.  Remains sad, depressed with intermittent thoughts of suicide.  No plan voiced.  Interacting appropriately with peers and staff.  No complaints of nausea this evening.  Pt took PM antibiotic and was given 10 ounces of power donny to drink and 2 packs of crackers to eat.   All precautions maintained.

## 2020-11-08 NOTE — PLAN OF CARE
Pt is calm and cooperative.  Still sad and down.  Not eating much.  Pt given crackers and powerade and encouraged to eat what she can.  Pt verbalized understanding.  No reports of S/I or H/I today.  No acute distress apparent, will continue to monitor.
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
## 2020-11-09 PROCEDURE — 90833 PSYTX W PT W E/M 30 MIN: CPT | Mod: ,,, | Performed by: PSYCHIATRY & NEUROLOGY

## 2020-11-09 PROCEDURE — 90833 PR PSYCHOTHERAPY W/PATIENT W/E&M, 30 MIN (ADD ON): ICD-10-PCS | Mod: ,,, | Performed by: PSYCHIATRY & NEUROLOGY

## 2020-11-09 PROCEDURE — 99233 PR SUBSEQUENT HOSPITAL CARE,LEVL III: ICD-10-PCS | Mod: ,,, | Performed by: PSYCHIATRY & NEUROLOGY

## 2020-11-09 PROCEDURE — 99233 SBSQ HOSP IP/OBS HIGH 50: CPT | Mod: ,,, | Performed by: PSYCHIATRY & NEUROLOGY

## 2020-11-09 PROCEDURE — 25000003 PHARM REV CODE 250: Performed by: PSYCHIATRY & NEUROLOGY

## 2020-11-09 PROCEDURE — 11400000 HC PSYCH PRIVATE ROOM

## 2020-11-09 RX ADMIN — AMOXICILLIN AND CLAVULANATE POTASSIUM 1 TABLET: 875; 125 TABLET, FILM COATED ORAL at 08:11

## 2020-11-09 RX ADMIN — LURASIDONE HYDROCHLORIDE 20 MG: 20 TABLET, FILM COATED ORAL at 05:11

## 2020-11-09 RX ADMIN — PRENATAL VITAMINS-IRON FUMARATE 27 MG IRON-FOLIC ACID 0.8 MG TABLET 1 TABLET: at 08:11

## 2020-11-09 RX ADMIN — FOLIC ACID 1 MG: 1 TABLET ORAL at 08:11

## 2020-11-09 NOTE — PLAN OF CARE
Pt has slept 6.5 hours with one interruption so far.  NAD.  Resp even & unlabored.  Pathways clear and bed is low.  Q 15 minute safety checks ongoing.  All precautions maintained.

## 2020-11-09 NOTE — PLAN OF CARE
Out and about the unit.  Spending time in the dayroom watching TV with peers.  No complaints.  No c/o nausea.  Encouraged to eat meals and snacks and drink fluids often.  Remains sad, depressed.  Denies SI.  Interacting appropriately with peers and staff.  Appetite is fair.  Compliant with meds and unit rules.  All precautions maintained.
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
## 2020-11-09 NOTE — PLAN OF CARE
"Accepts all morning medications except nicotine patch, "I don't smoke." Reports sad mood but notes improvement since admit. "I was arguing with my babies dad." Currently denying SI. No somatic complaints at this time.   "

## 2020-11-10 PROCEDURE — 90833 PSYTX W PT W E/M 30 MIN: CPT | Mod: ,,, | Performed by: PSYCHIATRY & NEUROLOGY

## 2020-11-10 PROCEDURE — 99232 PR SUBSEQUENT HOSPITAL CARE,LEVL II: ICD-10-PCS | Mod: ,,, | Performed by: PSYCHIATRY & NEUROLOGY

## 2020-11-10 PROCEDURE — 11400000 HC PSYCH PRIVATE ROOM

## 2020-11-10 PROCEDURE — 90833 PR PSYCHOTHERAPY W/PATIENT W/E&M, 30 MIN (ADD ON): ICD-10-PCS | Mod: ,,, | Performed by: PSYCHIATRY & NEUROLOGY

## 2020-11-10 PROCEDURE — 25000003 PHARM REV CODE 250: Performed by: PSYCHIATRY & NEUROLOGY

## 2020-11-10 PROCEDURE — 99232 SBSQ HOSP IP/OBS MODERATE 35: CPT | Mod: ,,, | Performed by: PSYCHIATRY & NEUROLOGY

## 2020-11-10 RX ADMIN — AMOXICILLIN AND CLAVULANATE POTASSIUM 1 TABLET: 875; 125 TABLET, FILM COATED ORAL at 08:11

## 2020-11-10 RX ADMIN — PRENATAL VITAMINS-IRON FUMARATE 27 MG IRON-FOLIC ACID 0.8 MG TABLET 1 TABLET: at 08:11

## 2020-11-10 RX ADMIN — FOLIC ACID 1 MG: 1 TABLET ORAL at 08:11

## 2020-11-10 RX ADMIN — LURASIDONE HYDROCHLORIDE 20 MG: 20 TABLET, FILM COATED ORAL at 05:11

## 2020-11-10 NOTE — PLAN OF CARE
Patient calm and cooperative,  in dayroom most of shift, tasking medications as ordered, vs stable, appetite good. Promoted safety plan, effective coping skills, mood improvement, absence of SI/HI will continue to monitor safety.

## 2020-11-10 NOTE — PLAN OF CARE
Behavior:  The patient attended psychotherapy group today. She was dressed in her own clothes and wearing a mask.    Intervention:  The Five Stages of Change was discussed in psychotherapy group this date. Patients identified what stages of change they believed that they are in using handouts P/C/P -1.2 and P/C/P - 1.1 from the Group Therapy for Substance Abuse, second edition, 2016. A Stages of Change Manual. Group discussion was then had about whether patients had a change in their life that they were thinking of making, whether related to substance abuse or not, and what stage of change they are in.    Response:  The patient stated that she is in the contemplation stage. She wonders how her behavior is affecting her unborn child.    Plan:   To continue to encourage patient to attend group psychotherapy and to learn more about the 5 Stages of Change.
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
## 2020-11-10 NOTE — PROGRESS NOTES
"   11/10/20 1040   [facility] Group Therapy   Group Name Therapeutic Recreation   Specific Interventions Cognitive Stimulation Training   Participation Level Appropriate;Attentive;Sharing   Participation Quality Cooperative   Insight/Motivation Applies New Skills;Good   Affect/Mood Display Appropriate   Cognition Alert   Psychomotor WNL   Patient reports "better" mood and states "I been talking to my mother it was good. I talked to my baby daddy. He wants me to get better. I don't want to stress my baby no more." Patient talked about how she should to pay attention to her own needs and wants, eat healthy, exercise and take time to do things she enjoy and prepare for her baby. CTRS encouraged patient to participate in healthy coping skills.  "

## 2020-11-10 NOTE — PLAN OF CARE
Pt has slept 5.5 hours with one interruption so far.  NAD.  Resp even & unlabored.  Pathways clear and bed is low.  Q 15 minute safety checks ongoing.  All precautions maintained.

## 2020-11-10 NOTE — PLAN OF CARE
Care plan reviewed, patient agrees with plan. States has no suicidal ideations or homicidal ideations.  Medication compliant, accepts meals and snacks.  Gait steady, no falls. Discussed patient's individualized safety plan, effective coping skills, mood improvement and improved sleep patterns. Will continue to monitor for safety.

## 2020-11-11 PROBLEM — R45.851 DEPRESSION WITH SUICIDAL IDEATION: Status: RESOLVED | Noted: 2020-10-22 | Resolved: 2020-11-11

## 2020-11-11 PROBLEM — F32.A DEPRESSION WITH SUICIDAL IDEATION: Status: RESOLVED | Noted: 2020-10-22 | Resolved: 2020-11-11

## 2020-11-11 PROCEDURE — 99232 SBSQ HOSP IP/OBS MODERATE 35: CPT | Mod: ,,, | Performed by: PSYCHIATRY & NEUROLOGY

## 2020-11-11 PROCEDURE — 11400000 HC PSYCH PRIVATE ROOM

## 2020-11-11 PROCEDURE — 90833 PR PSYCHOTHERAPY W/PATIENT W/E&M, 30 MIN (ADD ON): ICD-10-PCS | Mod: ,,, | Performed by: PSYCHIATRY & NEUROLOGY

## 2020-11-11 PROCEDURE — 25000003 PHARM REV CODE 250: Performed by: PSYCHIATRY & NEUROLOGY

## 2020-11-11 PROCEDURE — 90833 PSYTX W PT W E/M 30 MIN: CPT | Mod: ,,, | Performed by: PSYCHIATRY & NEUROLOGY

## 2020-11-11 PROCEDURE — 99232 PR SUBSEQUENT HOSPITAL CARE,LEVL II: ICD-10-PCS | Mod: ,,, | Performed by: PSYCHIATRY & NEUROLOGY

## 2020-11-11 RX ORDER — AMOXICILLIN AND CLAVULANATE POTASSIUM 875; 125 MG/1; MG/1
1 TABLET, FILM COATED ORAL EVERY 12 HOURS
Qty: 4 TABLET | Refills: 0 | Status: SHIPPED | OUTPATIENT
Start: 2020-11-11 | End: 2020-11-12

## 2020-11-11 RX ORDER — IBUPROFEN 200 MG
1 TABLET ORAL DAILY
Status: ON HOLD | COMMUNITY
Start: 2020-11-11 | End: 2020-12-11 | Stop reason: HOSPADM

## 2020-11-11 RX ORDER — LURASIDONE HYDROCHLORIDE 20 MG/1
20 TABLET, FILM COATED ORAL
Qty: 30 TABLET | Refills: 2 | Status: SHIPPED | OUTPATIENT
Start: 2020-11-11 | End: 2020-11-12

## 2020-11-11 RX ADMIN — AMOXICILLIN AND CLAVULANATE POTASSIUM 1 TABLET: 875; 125 TABLET, FILM COATED ORAL at 08:11

## 2020-11-11 RX ADMIN — PRENATAL VITAMINS-IRON FUMARATE 27 MG IRON-FOLIC ACID 0.8 MG TABLET 1 TABLET: at 08:11

## 2020-11-11 RX ADMIN — LURASIDONE HYDROCHLORIDE 20 MG: 20 TABLET, FILM COATED ORAL at 05:11

## 2020-11-11 RX ADMIN — FOLIC ACID 1 MG: 1 TABLET ORAL at 08:11
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
## 2020-11-11 NOTE — PLAN OF CARE
Patient out of dayroom most of shift, talking on phone, talking to peers. Calm and cooperative. Taking medications as ordered, vs stable, appetite good promoted safety plan, effective coping skills, mood improvement, absence SI/HI will continue to monitor safety.

## 2020-11-11 NOTE — PLAN OF CARE
Behavior:  The patient attended and participated in psychotherapy group this date. She was dressed in her own clothes and wearing a mask.    Intervention:  Coping skills was discussed in psychotherapy group this date with an emphasis on encouraging patients to put knowledge strategies, and skills into practice. 2. Assist patients in identifying positive coping skills to better deal with stress and avoid abusing substances.  Response:  The patient stated that her coping skills are to watch a movie, listen to music, pray, walk, and talk to someone close to her.    Plan:  To continue to encourage patient to attend group psychotherapy and to learn about ways that she may put knowledge, strategies, and positive coping skills into practice.

## 2020-11-11 NOTE — PLAN OF CARE
Pt lying quietly in bed with eyes closed. Appears asleep. Respirations even and unlabored. NADN. Slept 6.5 hours thus far with 2 interruptions. Safety and precautions maintained with rounds every 15 minutes. Bed is fixed in a low position and pathways kept clear. No falls occurred. Will continue to monitor closely for safety.
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
## 2020-11-11 NOTE — PLAN OF CARE
Shift note : patient is eating all meals and is taking all ordered medications . She is attending all groups to improve her coping skills . She will be going home tomorrow .

## 2020-11-12 VITALS
WEIGHT: 193.25 LBS | RESPIRATION RATE: 16 BRPM | BODY MASS INDEX: 30.33 KG/M2 | DIASTOLIC BLOOD PRESSURE: 57 MMHG | HEIGHT: 67 IN | SYSTOLIC BLOOD PRESSURE: 126 MMHG | HEART RATE: 95 BPM | TEMPERATURE: 98 F | OXYGEN SATURATION: 97 %

## 2020-11-12 PROCEDURE — 99239 HOSP IP/OBS DSCHRG MGMT >30: CPT | Mod: ,,, | Performed by: PSYCHIATRY & NEUROLOGY

## 2020-11-12 PROCEDURE — 90833 PSYTX W PT W E/M 30 MIN: CPT | Mod: ,,, | Performed by: PSYCHIATRY & NEUROLOGY

## 2020-11-12 PROCEDURE — 90833 PR PSYCHOTHERAPY W/PATIENT W/E&M, 30 MIN (ADD ON): ICD-10-PCS | Mod: ,,, | Performed by: PSYCHIATRY & NEUROLOGY

## 2020-11-12 PROCEDURE — 25000003 PHARM REV CODE 250: Performed by: PSYCHIATRY & NEUROLOGY

## 2020-11-12 PROCEDURE — 99239 PR HOSPITAL DISCHARGE DAY,>30 MIN: ICD-10-PCS | Mod: ,,, | Performed by: PSYCHIATRY & NEUROLOGY

## 2020-11-12 RX ORDER — IBUPROFEN 200 MG
1 TABLET ORAL DAILY
Status: ON HOLD | COMMUNITY
Start: 2020-11-12 | End: 2020-12-11 | Stop reason: HOSPADM

## 2020-11-12 RX ORDER — AMOXICILLIN AND CLAVULANATE POTASSIUM 875; 125 MG/1; MG/1
1 TABLET, FILM COATED ORAL EVERY 12 HOURS
Qty: 4 TABLET | Refills: 0 | Status: ON HOLD | OUTPATIENT
Start: 2020-11-12 | End: 2020-12-11 | Stop reason: HOSPADM

## 2020-11-12 RX ORDER — LURASIDONE HYDROCHLORIDE 20 MG/1
20 TABLET, FILM COATED ORAL
Qty: 30 TABLET | Refills: 2 | Status: ON HOLD | OUTPATIENT
Start: 2020-11-12 | End: 2021-05-29

## 2020-11-12 RX ADMIN — PRENATAL VITAMINS-IRON FUMARATE 27 MG IRON-FOLIC ACID 0.8 MG TABLET 1 TABLET: at 08:11

## 2020-11-12 RX ADMIN — FOLIC ACID 1 MG: 1 TABLET ORAL at 08:11

## 2020-11-12 RX ADMIN — AMOXICILLIN AND CLAVULANATE POTASSIUM 1 TABLET: 875; 125 TABLET, FILM COATED ORAL at 08:11

## 2020-11-12 NOTE — PLAN OF CARE
Lying quietly in bed, eyes closed, respirations even, unlabored. Apparently asleep. Slept 7 hours thus far with 2 interruptions. Safety precautions maintained. Rounds done every 15 minutes. Bed is fixed in low position and room is uncluttered and pathways are clear.

## 2020-11-12 NOTE — PROGRESS NOTES
PSYCHOTHERAPY ADD-ON +[id number]   16-37 minutes     Time: 16 minutes  Participants: Met with patient     Therapeutic Intervention Type: insight oriented psychotherapy, behavior modifying psychotherapy, supportive psychotherapy, interactive psychotherapy  Why chosen therapy is appropriate versus another modality: relevant to diagnosis, patient responds to this modality, evidence based practice     Target symptoms: depression, anxiety   Primary focus: depression, cognitive distortions (all-or-nothing thinking), psychosocial stressors  Psychotherapeutic techniques: supportive and cognitive techniques; psycho-education; coping skills; managing interpersonal stressors and psychosocial stressors; wrap up session and safety planning     Outcome monitoring methods: self-report, observation     Patient's response to intervention:  The patient's response to intervention is accepting.     Progress toward goals:  The patient's progress toward goals is good.    Sarath Car MD  Psychiatry

## 2020-11-12 NOTE — DISCHARGE SUMMARY
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 known as: VITAMIN C  Take 1 tablet (500 mg total) by mouth 2 (two) times daily.     calcium carbonate 200 mg calcium (500 mg) chewable tablet  Commonly known as: TUMS  Take 1 tablet (500 mg total) by mouth once daily. or as directed on the bottle     cholecalciferol (vitamin D3) 1,250 mcg (50,000 unit) capsule  Take 1 capsule (50,000 Units total) by mouth once a week.     ferrous gluconate 324 MG tablet  Commonly known as: FERGON  Take 324 mg by mouth.     prenatal vit,hector 74-iron-folic 27 mg iron- 1 mg Tab  Take 1 tablet by mouth once daily.        STOP taking these medications    cetirizine 5 MG tablet  Commonly known as: ZYRTEC     methylPREDNISolone 4 mg tablet  Commonly known as: MEDROL DOSEPACK     ondansetron 4 MG tablet  Commonly known as: ZOFRAN          No discharge procedures on file.  Follow-up Information     [facility].    Specialties: Psychology, Psychiatry, Behavioral Health  Why: Outpatient Psych Services, as scheduled  Contact information:  [address] [place] [address]  [phone]                     Diet: regular     Activity as tolerated    Total time spent discharging patient: 32 minutes    Sarath Car MD  Psychiatry

## 2020-11-12 NOTE — PLAN OF CARE
Behavior:  The patient attended and participated in psychotherapy group today. She was dressed in her own clothes and wearing a mask.    Intervention:  Personal values were discussed in psychotherapy group this date and how personal values plays a part in patients' lives. Patients identified values that are important to them using handout P/C/P - 7.1 from the Group Therapy for Substance Abuse, second edition, 2016. A Stages of Change Manual. Group discussion was had about patients' values, substance abuse behaviors that may not line up with their values, and how they might make some changes.    Response:  The patient stated that her most important values are kajal, self-control, help, acceptance, and her unborn baby.    Plan:   To continue to encourage patient to attend group psychotherapy and to learn more about ways that they might change to live in more accord with their values.

## 2020-11-12 NOTE — NURSING
Pt discharge arranged for today.  All belongings accounted for and will be returned upon discharge.  DC instructions and meds reviewed with pt, understanding verbalized.  Pt denies any S/I or H/I at this time.  No acute distress apparent.  Pt will have a ride coming to pick her up.

## 2020-11-12 NOTE — PSYCH
Patient will be following up with [facility] at [address] [place] [address] in [place], [phone].  Appointment is on 11/18/2020 at 8 am.  Patient will receive tobacco cessation therapy along with addictions counseling.  Patient had a negative UDS on admit.  AVS faxed on 11/12/2020 at 11:47 am to [phone].

## 2020-11-12 NOTE — CARE UPDATE
"Ronda Lopez "Ronda" MRN:[id number] ([facility]#[id number]) (20 y.o. F) (Adm: 20)  Formerly [facility] [id number]  Patient Demographics    Patient Name   Ronda Lopez Sex   Female          Age   2000 (20 y.o.) N   [id number] Address   [address] [address] Phone   [phone] (Home)   Patient Ethnicity & Race    Ethnic Group Patient Race   /Black Black or    Patient Demographics    Address   [address] [address] Phone   [phone] (Home) E-mail Address   [email or web address]   PCP and Center    Primary Care Provider Phone Center   Sherice Ross [phone] [facility]   Emergency Contact(s)    Name Relation Home Work Mobile   Daniela Locke Other   [phone]   Brionna Lopez Mother   [phone]   OsvaldoKayla Relative [phone]     Documents on File     Status Date Received Description   Documents for the Patient   Notice of Privacy Pract Ackn Received 14    Insurance Documents Received 14    Patient ID Unable to Obtain () 16 PATIENT DID NOT HAVE   Clinic Authorization Signed () 19    Provider Based Acknowledgement      Insurance Documents Received 02/01/15    Outside Correspondence  02/11/15    Outside Psychiatric Documentation  04/06/15    HIM [place] Authorization  04/20/15 Disability   Notice of Privacy Pract Ackn Signed 08/24/15 HIPAA/MOTHER BRIONNA   Notice of Privacy Pract Ackn LEIGH ANN Signed 16 HIPAA/MOUSTAPHA KENNEDY/HIGINIO   Clinic Authorization LEIGH ANN      Provider Based Acknowledgement LEIGH ANN Signed () 16 MOUSTAPHA KENNEDY/HIGINIO   Disclosure Agreement Accepted 16    Plain Language Summary English No, Patient Declined Print () 06/10/17    HIM [place] Authorization  17 DISABILITY   Plain Language Summary English No, Patient Declined Print () 17    Insurance Verification Fax Received () 07/10/17 [place] VERIF/[place] " [facility]/07-10-17   Release of Information Received 17    HIM [place] Authorization Received 10/12/17 HIM Auth for ReleaseID [id number]   Clinic Authorization Signed () 17 CONSENT/SELF   HIM [place] Authorization Received 17 HIM Auth for ReleaseID [id number]   HIM [place] Authorization Received 18 HIM Auth for ReleaseID [id number]   HIM [place] Authorization Received 18 HIM Auth for ReleaseID [id number]   [facility] Provider Based Facility Disclosure      Plain Language Summary English No, Patient Declined Print () 18    Plain Language Summary English No, Patient Declined Print () 18    Advance Beneficiary Notice      Notice of Privacy Practice TG Signed 18 Hipaa   Insurance Documents   VERIFICATION OF BENEFITS   Authorization or Referral   Treatment Pre Authorization Form   Patient ID Received 12/15/18 [place] 2021   Insurance Documents Received 12/15/18 Medicaid Card   Involvement In Care      HIM [place] Authorization  19 Authorization   Advance Directive Acknowledgement Received 19 PSDA/ consent/self   Plain Language Summary English Acknowledged () 19 FIN ASSIST INFO   Plain Language Summary English Acknowledged () 19 FIN ASSIST INFO   Plain Language Summary English Acknowledged () 19    Notice of Privacy Practice [facility] [[email or web address] Disclosure Signed 19 signed/self   Notice of Privacy Practice Count includes the [facility] Received 19 HIPAA/self   Patient ID Received () 19 none available   HIM [place] Authorization  19    Release of Information Received 19    Authorization or Referral Received () 19 GENERAL   Outside Psychiatric Documentation Received 19    Plain Language Summary English Acknowledged () 19    Insurance Documents      Insurance Documents      Plain Language Summary English Acknowledged () 09/10/19    [facility] Facility  Disclosure Signed 19    Plain Language Summary English Acknowledged () 19    Plain Language Summary English Acknowledged () 19    Plain Language Summary English Acknowledged () 10/30/19    Plain Language Summary English Acknowledged () 19    Plain Language Summary English Acknowledged () 19 fin asst info   Authorization or Referral Received 19 GENERAL   Release of Information Received 19    Release of Information Received 19    Plain Language Summary English Acknowledged () 20    HIM [place] Authorization Received 20 HIM Auth for ReleaseID [id number]   Plain Language Summary English Acknowledged () 20    Plain Language Summary English Acknowledged () 20 fin asst info   Plain Language Summary English Acknowledged () 20 fin asst info   Plain Language Summary English Acknowledged () 20 Financial Asst Info   Plain Language Summary English Acknowledged () 20    Plain Language Summary English Acknowledged () 20 declined   HIM [place] Authorization Received 20 HIM Auth for ReleaseID [id number]   HIM [place] Authorization Received 20 HIM Auth for ReleaseID [id number]   Notice of Privacy Practice [facility] Signed 20 hipaa/self   Plain Language Summary English Acknowledged () 20    Plain Language Summary English Acknowledged () 20    Plain Language Summary English Acknowledged () 20    HIM [place] Authorization  20    Plain Language Summary English Acknowledged () 20    Outside Discharge Summary Received 20    Plain Language Summary English Acknowledged () 20    Plain Language Summary English Acknowledged () 10/21/20    Insurance Documents Received 10/24/20 Treatment Pre-Authorization Form   Insurance Documents Received 10/24/20 Insurance Verification   Miscellaneous Documents  clinic Received 10/24/20 Patient Belongings Sheet   Insurance Documents Received 10/24/20 Coordination of Benefits   HIM [place] Authorization Received 10/24/20 Authorization to Disclose Healthcare Information   HIM [place] Authorization Received 10/28/20  [place] Form   HIM [place] Authorization Received 10/28/20  [place] Form   Authorization or Referral Received 10/29/20 [facility] MEDICAID PROGRAM NOTICE OF MEDICAL CERTIFICATION   Plain Language Summary English Acknowledged () 20    Insurance Verification Fax Received 20 cc verification fax   Advance Directive Acknowledgement Received 20 pdsa - self   CE Prospective Auth Received () 18 Authorization Document from [employer]   System-Retrieved CE Auth Form Received () 18 External Authorization Form from Social Security Administration   HIM Release of Information Output  (Deleted) 19 Notes Individual   HIM Release of Information Output  19 Requested records   [facility] Contracted Facility Disclosure Unable to Obtain (Deleted) 19    Patient Photo   Photo of Patient   CE Prospective Auth Received () 20 Authorization Document from [employer]   System-Retrieved CE Auth Form Received () 20 External Authorization Form from Social Security Administration   HIM Release of Information Output  (Deleted) 20 Requested records   HIM Release of Information Output  20 Requested records   HIM Release of Information Output  06/10/20 Requested records   HIM Release of Information Output  (Deleted) 20    HIM Release of Information Output  (Deleted) 20 Requested records   Documents for the Encounter   Medicare Lifetime Reserve Form      Important Medicare Message Printed at Discharge      Advance Beneficiary Notice      Hospital Authorization Scanned Received 20 consent - self   Hospital Authorization      Hospital  Authorization Unable to Obtain (Deleted) 11/05/20    Admission Information    Current Information    Attending Provider Admitting Provider Admission Type Admission Status   MD Sarath Rivero MD Elective Admission (Confirmed)          Admission Date/Time Discharge Date Hospital Service Auth/Cert Status   11/05/20  10:10 PM  Psychiatry Incomplete          Hospital Area Unit Room/Bed    [facility] 214/214            Discharge Disposition Discharge Destination   Home or Self Care    Admission    Complaint      Hospital Account    Name Acct ID Class Status Primary Coverage   Tammy Lopez [id number] IP- Psych Open [place] HLTH[place]          Guarantor Account (for Hospital Account #[id number])    Name Relation to Pt Service Area Active? Acct Type   Tammy Lopez Self OHSSA Yes Behavioral Health   Address Phone     [address] [place] [address] [phone](H)            Coverage Information (for Hospital Account #[id number])    F/O Payor/Plan Precert #   [place]    Subscriber Subscriber #   Tammy Lopez [id number]   Address Phone   [address]   [place] [address]

## 2020-11-12 NOTE — PLAN OF CARE
Patient calm and cooperative, vs stable, appetite good, taking medications as ordered, promoted safety plan, effective coping skills, mood improvement, absence of SI/HI will continue to monitor safety.

## 2020-12-02 ENCOUNTER — HOSPITAL ENCOUNTER (EMERGENCY)
Facility: HOSPITAL | Age: 20
Discharge: HOME | End: 2020-12-02
Attending: SURGERY
Payer: MEDICAID

## 2020-12-02 VITALS
SYSTOLIC BLOOD PRESSURE: 119 MMHG | TEMPERATURE: 98 F | OXYGEN SATURATION: 100 % | WEIGHT: 196.63 LBS | HEIGHT: 67 IN | DIASTOLIC BLOOD PRESSURE: 75 MMHG | BODY MASS INDEX: 30.86 KG/M2 | HEART RATE: 83 BPM | RESPIRATION RATE: 16 BRPM

## 2020-12-02 DIAGNOSIS — Z20.822 CLOSE EXPOSURE TO COVID-19 VIRUS: Primary | ICD-10-CM

## 2020-12-02 PROCEDURE — U0003 INFECTIOUS AGENT DETECTION BY NUCLEIC ACID (DNA OR RNA); SEVERE ACUTE RESPIRATORY SYNDROME CORONAVIRUS 2 (SARS-COV-2) (CORONAVIRUS DISEASE [COVID-19]), AMPLIFIED PROBE TECHNIQUE, MAKING USE OF HIGH THROUGHPUT TECHNOLOGIES AS DESCRIBED BY CMS-2020-01-R: HCPCS

## 2020-12-02 PROCEDURE — 99283 EMERGENCY DEPT VISIT LOW MDM: CPT

## 2020-12-02 RX ORDER — AZITHROMYCIN 250 MG/1
TABLET, FILM COATED ORAL
Qty: 6 TABLET | Refills: 0 | Status: ON HOLD | OUTPATIENT
Start: 2020-12-02 | End: 2020-12-11 | Stop reason: HOSPADM

## 2020-12-03 LAB — SARS-COV-2 RNA RESP QL NAA+PROBE: NOT DETECTED

## 2020-12-03 NOTE — ED PROVIDER NOTES
[facility]                                                 Chief Complaint  20 y.o. female with No chief complaint on file.      History of Present Illness  Tammy Lopez presents to the emergency room with COVID concerns   Patient with clear nasal drainage with nasal mucosa erythema noted this a.m.  Pt states that they have been in contact with several people with COVID virus  Clear lung sounds with no wheezing or sputum identified on evaluation today  Patient has no evidence of hypoxia, no fever on  emergency room triage    The history is provided by the patient   device was not used during this ER visit  Medical history: ADHD and depression  History reviewed. No pertinent surgical history.   No Known Allergies   History reviewed. No pertinent family history.     I have reviewed all of this patient's past medical, surgical, family, and social   histories as well as active allergies and medications documented in the  electronic medical record    Review of Systems and Physical Exam      Review of Systems  -- Constitution - no fever, denies fatigue, no weakness, no chills  -- Eyes - no tearing or redness, no visual disturbance  -- Ear, Nose - sneezing, nasal congestion and clear discharge   -- Mouth,Throat - sore throat, no toothache, normal voice, normal swallowing  -- Respiratory - cough and congestion, no shortness of breath, no DELUCA  -- Cardiovascular - denies chest pain, no palpitations, denies claudication  -- Gastrointestinal - denies abdominal pain, nausea, vomiting, or diarrhea  -- Genitourinary - no dysuria, no hematuria, no flank pain, no bladder pain  -- Musculoskeletal - denies back pain, negative for trauma or injury  -- Neurological - no headache, denies weakness or seizure; no LOC  -- Skin - denies pallor, rash, or changes in skin. no hives or welts noted     Physical Exam  -- Nursing note and vitals reviewed  -- Constitutional: Appears well-developed and  well-nourished  -- Head: Atraumatic. Normocephalic. No obvious abnormality  -- Eyes: Pupils are equal and reactive to light. Normal conjunctiva and lids  -- Nose: nasal mucosa erythema and edema; clear nasal discharge noted   -- Throat: post-nasal drip with mild posterior oropharnyx erythema  -- Ears: External ears and TM normal bilaterally. Normal hearing and no drainage  -- Neck: Normal range of motion. Neck supple. No masses, trachea midline  -- Cardiac: Normal rate, regular rhythm and normal heart sounds  -- Pulmonary: Normal respiratory effort, breath sounds clear to auscultation  -- Abdominal: Soft, no tenderness. Normal bowel sounds. Normal liver edge  -- Musculoskeletal: Normal range of motion, no effusions. Joints stable   -- Neurological: No focal deficits. Showed good interaction with staff  -- Vascular: Posterior tibial, dorsalis pedis and radial pulses 2+ bilaterally       Emergency Room Course      Treatment and Evaluation  -- routine coronavirus PCR is currently pending     Diagnosis  [Z20.828] Close exposure to COVID-19 virus (Primary)    Disposition and Plan  -- Disposition: home  -- Condition: stable  -- Follow-up: Patient to follow up with Sherice Ross NP in 1-2 days.  -- I advised the patient that we have found no life threatening condition today  -- At this time, I believe the patient is clinically stable for discharge.   -- The patient acknowledges that close follow up with a MD is required   -- Patient agrees to comply with all instruction and direction given in the ER    This note is dictated on M*Modal word recognition program.  There are word recognition mistakes that are occasionally missed on review.         Graham Goff MD  12/02/20 7502

## 2020-12-03 NOTE — ED TRIAGE NOTES
20 y.o. female presents to ER   Chief Complaint   Patient presents with    COVID-19 Concerns     no symptoms   . No acute distress noted.  States had close contact with someone who tested + for corona virus.  Denies fever & pain.

## 2020-12-07 ENCOUNTER — PATIENT OUTREACH (OUTPATIENT)
Dept: EMERGENCY MEDICINE | Facility: HOSPITAL | Age: 20
End: 2020-12-07

## 2020-12-07 NOTE — PROGRESS NOTES
Jacki Sood  ED Navigator  Emergency Department    Project: [facility] ED Navigator  Role: Community Health Worker    Date: 12/07/2020  Patient Name: Tammy Lopez  MRN: [id number]  PCP: Sherice Ross NP    Assessment:     Tammy Lopez is a 20 y.o. female who has presented to ED for No chief complaint on file.  . Patient has visited the ED 5 times in the past 3 months. Patient did contact PCP.     ED Navigator Patient Assessment    Consent to Services  Does patient consent to completing the assessment?: Yes  Transportation  Does the patient have issues with Transportation?: No  Insurance Coverage  Do you have coverage/adequate coverage?: Yes  [place]  Is patient able to afford co-pays/deductibles?: Yes  Is patient able to afford HME or supplies?: Yes  Does patient have an established [facility] PCP?: No  Does patient need assistance finding a PCP?: No  Does the patient have a lack of adequate coverage?: No  Specialist Appointment  Did the patient come to the ED to see a specialist?: No  Does the patient have a pending specialist referral?: No  Does the patient have a specialist appointment made?: No  PCP Follow Up Appointment  Does the patient have a follow up appontment with their PCP?: No  When was the last time you saw your PCP?: 12/4/20  Why does the patient not have a follow up scheduled?: Other (see comments) (Comment: Patient has already followed up with PCP)  Medications  Is patient able to afford medication?: Yes  Is patient unable to get medication due to lack of transportation?: No  Psychological  Does the patient have psycho-social concerns?: No  Food  Communication/Education  Does the patient have limited English proficiency/English not primary language?: No  Does patient have low literacy and/or low health literacy?: No  Does patient have concerns with care?: No  Does patient have dissatisfaction with care?: No  Other Financial Concers  Does the patient have  general financial concerns?: No  Other Social Barriers/Concerns  Does the patient have any additional barriers or concerns?: Other (see comments) (Comment: Patient identified no additional barriers)         Social History     Socioeconomic History    Marital status: Single     Spouse name: Not on file    Number of children: 0    Years of education: Not on file    Highest education level: Not on file   Occupational History     Comment: unemployed    Social Needs    Financial resource strain: Not very hard    Food insecurity     Worry: Never true     Inability: Never true    Transportation needs     Medical: No     Non-medical: No   Tobacco Use    Smoking status: Current Some Day Smoker     Packs/day: 0.00     Years: 5.00     Pack years: 0.00     Types: Cigarettes, Vaping with nicotine     Start date:      Last attempt to quit: 2019     Years since quittin.0    Smokeless tobacco: Never Used    Tobacco comment: quit vaping 2019   Substance and Sexual Activity    Alcohol use: Yes     Frequency: Never     Comment: occasionally    Drug use: Yes     Types: Marijuana     Comment: last marijuana use 2020    Sexual activity: Not Currently     Partners: Male     Birth control/protection: None     Comment: single   Lifestyle    Physical activity     Days per week: Patient refused     Minutes per session: Patient refused    Stress: To some extent   Relationships    Social connections     Talks on phone: More than three times a week     Gets together: More than three times a week     Attends Jainism service: Never     Active member of club or organization: No     Attends meetings of clubs or organizations: Never     Relationship status: Never    Other Topics Concern    Patient feels they ought to cut down on drinking/drug use No    Patient annoyed by others criticizing their drinking/drug use No    Patient has felt bad or guilty about drinking/drug use No    Patient has had a  drink/used drugs as an eye opener in the AM No   Social History Narrative    Not on file       Plan:     Patient stated she attended the ED due to COVID-19 concerns. She stated she has since seen her PCP at [facility]. (12/04/20). Patient completed the assessment with ED Navigator and identified no additional needs at this time.    ED Navigator provided patient with OOC information as well as local urgent care centers.    ED Navigator and patient agreed to follow up in approximately 3 weeks.    Douglas Sood  ED Navigator  [phone]

## 2020-12-11 PROBLEM — O16.3 HYPERTENSION AFFECTING PREGNANCY IN THIRD TRIMESTER: Status: ACTIVE | Noted: 2020-12-11

## 2020-12-19 PROBLEM — N89.8 VAGINAL DISCHARGE: Status: RESOLVED | Noted: 2019-12-05 | Resolved: 2020-12-19

## 2020-12-19 PROBLEM — Z3A.16 16 WEEKS GESTATION OF PREGNANCY: Status: RESOLVED | Noted: 2020-11-06 | Resolved: 2020-12-19

## 2020-12-19 PROBLEM — Z3A.13 13 WEEKS GESTATION OF PREGNANCY: Status: RESOLVED | Noted: 2020-10-22 | Resolved: 2020-12-19

## 2021-01-11 ENCOUNTER — HOSPITAL ENCOUNTER (INPATIENT)
Facility: HOSPITAL | Age: 21
LOS: 2 days | Discharge: HOME | DRG: 832 | End: 2021-01-13
Attending: PSYCHIATRY & NEUROLOGY | Admitting: PSYCHIATRY & NEUROLOGY
Payer: MEDICAID

## 2021-01-11 DIAGNOSIS — F32.A DEPRESSION: ICD-10-CM

## 2021-01-11 DIAGNOSIS — F60.3 BORDERLINE PERSONALITY DISORDER: Primary | ICD-10-CM

## 2021-01-11 DIAGNOSIS — F32.A DEPRESSION DURING PREGNANCY IN SECOND TRIMESTER: ICD-10-CM

## 2021-01-11 DIAGNOSIS — Z3A.24 24 WEEKS GESTATION OF PREGNANCY: ICD-10-CM

## 2021-01-11 DIAGNOSIS — O99.342 DEPRESSION DURING PREGNANCY IN SECOND TRIMESTER: ICD-10-CM

## 2021-01-11 DIAGNOSIS — R79.89 DECREASED THYROID STIMULATING HORMONE (TSH) LEVEL: ICD-10-CM

## 2021-01-11 DIAGNOSIS — F33.2 SEVERE EPISODE OF RECURRENT MAJOR DEPRESSIVE DISORDER, WITHOUT PSYCHOTIC FEATURES: ICD-10-CM

## 2021-01-11 PROCEDURE — 11400000 HC PSYCH PRIVATE ROOM

## 2021-01-11 PROCEDURE — 83036 HEMOGLOBIN GLYCOSYLATED A1C: CPT

## 2021-01-12 PROBLEM — Z3A.24 24 WEEKS GESTATION OF PREGNANCY: Status: ACTIVE | Noted: 2021-01-12

## 2021-01-12 LAB
ESTIMATED AVG GLUCOSE: 97 MG/DL (ref 68–131)
HBA1C MFR BLD HPLC: 5 % (ref 4–5.6)

## 2021-01-12 PROCEDURE — 90833 PR PSYCHOTHERAPY W/PATIENT W/E&M, 30 MIN (ADD ON): ICD-10-PCS | Mod: ,,, | Performed by: PSYCHIATRY & NEUROLOGY

## 2021-01-12 PROCEDURE — 99222 PR INITIAL HOSPITAL CARE,LEVL II: ICD-10-PCS | Mod: ,,, | Performed by: NURSE PRACTITIONER

## 2021-01-12 PROCEDURE — 90833 PSYTX W PT W E/M 30 MIN: CPT | Mod: ,,, | Performed by: PSYCHIATRY & NEUROLOGY

## 2021-01-12 PROCEDURE — 99221 1ST HOSP IP/OBS SF/LOW 40: CPT | Mod: ,,, | Performed by: OBSTETRICS & GYNECOLOGY

## 2021-01-12 PROCEDURE — 99223 PR INITIAL HOSPITAL CARE,LEVL III: ICD-10-PCS | Mod: ,,, | Performed by: PSYCHIATRY & NEUROLOGY

## 2021-01-12 PROCEDURE — 11400000 HC PSYCH PRIVATE ROOM

## 2021-01-12 PROCEDURE — 25000003 PHARM REV CODE 250: Performed by: PSYCHIATRY & NEUROLOGY

## 2021-01-12 PROCEDURE — 99221 PR INITIAL HOSPITAL CARE,LEVL I: ICD-10-PCS | Mod: ,,, | Performed by: OBSTETRICS & GYNECOLOGY

## 2021-01-12 PROCEDURE — 99223 1ST HOSP IP/OBS HIGH 75: CPT | Mod: ,,, | Performed by: PSYCHIATRY & NEUROLOGY

## 2021-01-12 PROCEDURE — 99222 1ST HOSP IP/OBS MODERATE 55: CPT | Mod: ,,, | Performed by: NURSE PRACTITIONER

## 2021-01-12 RX ORDER — OLANZAPINE 10 MG/2ML
10 INJECTION, POWDER, FOR SOLUTION INTRAMUSCULAR EVERY 6 HOURS PRN
Status: DISCONTINUED | OUTPATIENT
Start: 2021-01-12 | End: 2021-01-13 | Stop reason: HOSPADM

## 2021-01-12 RX ORDER — HYDROXYZINE PAMOATE 50 MG/1
50 CAPSULE ORAL EVERY 6 HOURS PRN
Status: DISCONTINUED | OUTPATIENT
Start: 2021-01-12 | End: 2021-01-13 | Stop reason: HOSPADM

## 2021-01-12 RX ORDER — IBUPROFEN 200 MG
1 TABLET ORAL DAILY PRN
Status: DISCONTINUED | OUTPATIENT
Start: 2021-01-12 | End: 2021-01-13 | Stop reason: HOSPADM

## 2021-01-12 RX ORDER — OLANZAPINE 10 MG/1
10 TABLET ORAL EVERY 6 HOURS PRN
Status: DISCONTINUED | OUTPATIENT
Start: 2021-01-12 | End: 2021-01-13 | Stop reason: HOSPADM

## 2021-01-12 RX ORDER — LURASIDONE HYDROCHLORIDE 20 MG/1
20 TABLET, FILM COATED ORAL
Status: DISCONTINUED | OUTPATIENT
Start: 2021-01-12 | End: 2021-01-13 | Stop reason: HOSPADM

## 2021-01-12 RX ORDER — PRENATAL WITH FERROUS FUM AND FOLIC ACID 3080; 920; 120; 400; 22; 1.84; 3; 20; 10; 1; 12; 200; 27; 25; 2 [IU]/1; [IU]/1; MG/1; [IU]/1; MG/1; MG/1; MG/1; MG/1; MG/1; MG/1; UG/1; MG/1; MG/1; MG/1; MG/1
1 TABLET ORAL DAILY
Status: DISCONTINUED | OUTPATIENT
Start: 2021-01-12 | End: 2021-01-13 | Stop reason: HOSPADM

## 2021-01-12 RX ADMIN — PRENATAL VITAMINS-IRON FUMARATE 27 MG IRON-FOLIC ACID 0.8 MG TABLET 1 TABLET: at 05:01

## 2021-01-12 RX ADMIN — LURASIDONE HYDROCHLORIDE 20 MG: 20 TABLET, FILM COATED ORAL at 05:01

## 2021-01-13 VITALS
SYSTOLIC BLOOD PRESSURE: 131 MMHG | OXYGEN SATURATION: 99 % | HEART RATE: 95 BPM | BODY MASS INDEX: 29.88 KG/M2 | TEMPERATURE: 98 F | RESPIRATION RATE: 18 BRPM | WEIGHT: 190.38 LBS | HEIGHT: 67 IN | DIASTOLIC BLOOD PRESSURE: 84 MMHG

## 2021-01-13 PROBLEM — F32.A DEPRESSION: Status: RESOLVED | Noted: 2019-08-13 | Resolved: 2021-01-13

## 2021-01-13 PROCEDURE — 90833 PSYTX W PT W E/M 30 MIN: CPT | Mod: ,,, | Performed by: PSYCHIATRY & NEUROLOGY

## 2021-01-13 PROCEDURE — 90833 PR PSYCHOTHERAPY W/PATIENT W/E&M, 30 MIN (ADD ON): ICD-10-PCS | Mod: ,,, | Performed by: PSYCHIATRY & NEUROLOGY

## 2021-01-13 PROCEDURE — 99239 PR HOSPITAL DISCHARGE DAY,>30 MIN: ICD-10-PCS | Mod: ,,, | Performed by: PSYCHIATRY & NEUROLOGY

## 2021-01-13 PROCEDURE — 25000003 PHARM REV CODE 250: Performed by: PSYCHIATRY & NEUROLOGY

## 2021-01-13 PROCEDURE — 99239 HOSP IP/OBS DSCHRG MGMT >30: CPT | Mod: ,,, | Performed by: PSYCHIATRY & NEUROLOGY

## 2021-01-13 RX ADMIN — PRENATAL VITAMINS-IRON FUMARATE 27 MG IRON-FOLIC ACID 0.8 MG TABLET 1 TABLET: at 08:01

## 2021-01-25 ENCOUNTER — HOSPITAL ENCOUNTER (OUTPATIENT)
Facility: HOSPITAL | Age: 21
Discharge: HOME | End: 2021-01-27
Attending: STUDENT IN AN ORGANIZED HEALTH CARE EDUCATION/TRAINING PROGRAM | Admitting: STUDENT IN AN ORGANIZED HEALTH CARE EDUCATION/TRAINING PROGRAM
Payer: MEDICAID

## 2021-01-25 DIAGNOSIS — R10.9 FLANK PAIN IN PREGNANT PATIENT: ICD-10-CM

## 2021-01-25 DIAGNOSIS — O23.02 PYELONEPHRITIS AFFECTING PREGNANCY IN SECOND TRIMESTER: Primary | ICD-10-CM

## 2021-01-25 DIAGNOSIS — O26.899 FLANK PAIN IN PREGNANT PATIENT: ICD-10-CM

## 2021-01-25 LAB
BACTERIA #/AREA URNS HPF: ABNORMAL /HPF
BASOPHILS # BLD AUTO: 0.01 K/UL (ref 0–0.2)
BASOPHILS NFR BLD: 0.1 % (ref 0–1.9)
BILIRUB UR QL STRIP: NEGATIVE
CLARITY UR: CLEAR
COLOR UR: YELLOW
DIFFERENTIAL METHOD: ABNORMAL
EOSINOPHIL # BLD AUTO: 0 K/UL (ref 0–0.5)
EOSINOPHIL NFR BLD: 0.2 % (ref 0–8)
ERYTHROCYTE [DISTWIDTH] IN BLOOD BY AUTOMATED COUNT: 12.3 % (ref 11.5–14.5)
GLUCOSE UR QL STRIP: NEGATIVE
HCT VFR BLD AUTO: 29.7 % (ref 37–48.5)
HGB BLD-MCNC: 9.9 G/DL (ref 12–16)
HGB UR QL STRIP: ABNORMAL
HYALINE CASTS #/AREA URNS LPF: 0 /LPF
IMM GRANULOCYTES # BLD AUTO: 0.03 K/UL (ref 0–0.04)
IMM GRANULOCYTES NFR BLD AUTO: 0.4 % (ref 0–0.5)
KETONES UR QL STRIP: NEGATIVE
LEUKOCYTE ESTERASE UR QL STRIP: ABNORMAL
LYMPHOCYTES # BLD AUTO: 1.2 K/UL (ref 1–4.8)
LYMPHOCYTES NFR BLD: 14.1 % (ref 18–48)
MCH RBC QN AUTO: 27.9 PG (ref 27–31)
MCHC RBC AUTO-ENTMCNC: 33.3 G/DL (ref 32–36)
MCV RBC AUTO: 84 FL (ref 82–98)
MICROSCOPIC COMMENT: ABNORMAL
MONOCYTES # BLD AUTO: 0.6 K/UL (ref 0.3–1)
MONOCYTES NFR BLD: 6.6 % (ref 4–15)
NEUTROPHILS # BLD AUTO: 6.6 K/UL (ref 1.8–7.7)
NEUTROPHILS NFR BLD: 78.6 % (ref 38–73)
NITRITE UR QL STRIP: NEGATIVE
NRBC BLD-RTO: 0 /100 WBC
PH UR STRIP: 7 [PH] (ref 5–8)
PLATELET # BLD AUTO: 255 K/UL (ref 150–350)
PMV BLD AUTO: 10.3 FL (ref 9.2–12.9)
PROT UR QL STRIP: ABNORMAL
RBC # BLD AUTO: 3.55 M/UL (ref 4–5.4)
RBC #/AREA URNS HPF: >100 /HPF (ref 0–4)
SARS-COV-2 RDRP RESP QL NAA+PROBE: NEGATIVE
SP GR UR STRIP: 1.02 (ref 1–1.03)
SQUAMOUS #/AREA URNS HPF: 8 /HPF
URN SPEC COLLECT METH UR: ABNORMAL
UROBILINOGEN UR STRIP-ACNC: NEGATIVE EU/DL
WBC # BLD AUTO: 8.34 K/UL (ref 3.9–12.7)
WBC #/AREA URNS HPF: 75 /HPF (ref 0–5)

## 2021-01-25 PROCEDURE — 36415 COLL VENOUS BLD VENIPUNCTURE: CPT

## 2021-01-25 PROCEDURE — 85025 COMPLETE CBC W/AUTO DIFF WBC: CPT

## 2021-01-25 PROCEDURE — 87186 SC STD MICRODIL/AGAR DIL: CPT

## 2021-01-25 PROCEDURE — G0378 HOSPITAL OBSERVATION PER HR: HCPCS

## 2021-01-25 PROCEDURE — 99211 OFF/OP EST MAY X REQ PHY/QHP: CPT

## 2021-01-25 PROCEDURE — 63600175 PHARM REV CODE 636 W HCPCS: Performed by: ADVANCED PRACTICE MIDWIFE

## 2021-01-25 PROCEDURE — 25000003 PHARM REV CODE 250: Performed by: STUDENT IN AN ORGANIZED HEALTH CARE EDUCATION/TRAINING PROGRAM

## 2021-01-25 PROCEDURE — 96375 TX/PRO/DX INJ NEW DRUG ADDON: CPT

## 2021-01-25 PROCEDURE — 87077 CULTURE AEROBIC IDENTIFY: CPT

## 2021-01-25 PROCEDURE — 87086 URINE CULTURE/COLONY COUNT: CPT

## 2021-01-25 PROCEDURE — U0002 COVID-19 LAB TEST NON-CDC: HCPCS

## 2021-01-25 PROCEDURE — 96376 TX/PRO/DX INJ SAME DRUG ADON: CPT

## 2021-01-25 PROCEDURE — 81000 URINALYSIS NONAUTO W/SCOPE: CPT

## 2021-01-25 PROCEDURE — 63600175 PHARM REV CODE 636 W HCPCS: Performed by: STUDENT IN AN ORGANIZED HEALTH CARE EDUCATION/TRAINING PROGRAM

## 2021-01-25 PROCEDURE — 25000003 PHARM REV CODE 250: Performed by: ADVANCED PRACTICE MIDWIFE

## 2021-01-25 PROCEDURE — 87088 URINE BACTERIA CULTURE: CPT

## 2021-01-25 RX ORDER — BUTORPHANOL TARTRATE 2 MG/ML
INJECTION INTRAMUSCULAR; INTRAVENOUS
Status: DISPENSED
Start: 2021-01-25 | End: 2021-01-26

## 2021-01-25 RX ORDER — ACETAMINOPHEN 500 MG
500 TABLET ORAL EVERY 6 HOURS PRN
Status: DISCONTINUED | OUTPATIENT
Start: 2021-01-25 | End: 2021-01-27 | Stop reason: HOSPADM

## 2021-01-25 RX ORDER — SODIUM CHLORIDE 9 MG/ML
INJECTION, SOLUTION INTRAVENOUS CONTINUOUS
Status: DISCONTINUED | OUTPATIENT
Start: 2021-01-25 | End: 2021-01-27 | Stop reason: HOSPADM

## 2021-01-25 RX ORDER — SODIUM CHLORIDE, SODIUM LACTATE, POTASSIUM CHLORIDE, CALCIUM CHLORIDE 600; 310; 30; 20 MG/100ML; MG/100ML; MG/100ML; MG/100ML
INJECTION, SOLUTION INTRAVENOUS CONTINUOUS
Status: DISCONTINUED | OUTPATIENT
Start: 2021-01-25 | End: 2021-01-26

## 2021-01-25 RX ORDER — ONDANSETRON 8 MG/1
8 TABLET, ORALLY DISINTEGRATING ORAL EVERY 8 HOURS PRN
Status: DISCONTINUED | OUTPATIENT
Start: 2021-01-25 | End: 2021-01-27 | Stop reason: HOSPADM

## 2021-01-25 RX ORDER — BUTORPHANOL TARTRATE 2 MG/ML
1 INJECTION INTRAMUSCULAR; INTRAVENOUS EVERY 4 HOURS PRN
Status: DISCONTINUED | OUTPATIENT
Start: 2021-01-25 | End: 2021-01-27 | Stop reason: HOSPADM

## 2021-01-25 RX ORDER — GENTAMICIN SULFATE 40 MG/ML
INJECTION, SOLUTION INTRAMUSCULAR; INTRAVENOUS
Status: COMPLETED
Start: 2021-01-25 | End: 2021-01-25

## 2021-01-25 RX ADMIN — SODIUM CHLORIDE, SODIUM LACTATE, POTASSIUM CHLORIDE, AND CALCIUM CHLORIDE 125 ML/HR: .6; .31; .03; .02 INJECTION, SOLUTION INTRAVENOUS at 09:01

## 2021-01-25 RX ADMIN — ACETAMINOPHEN 500 MG: 500 TABLET ORAL at 09:01

## 2021-01-25 RX ADMIN — SODIUM CHLORIDE 146 MG: 9 INJECTION, SOLUTION INTRAVENOUS at 09:01

## 2021-01-25 RX ADMIN — SODIUM CHLORIDE, SODIUM LACTATE, POTASSIUM CHLORIDE, AND CALCIUM CHLORIDE 500 ML: .6; .31; .03; .02 INJECTION, SOLUTION INTRAVENOUS at 05:01

## 2021-01-25 RX ADMIN — BUTORPHANOL TARTRATE 1 MG: 2 INJECTION, SOLUTION INTRAMUSCULAR; INTRAVENOUS at 05:01

## 2021-01-25 RX ADMIN — AMPICILLIN SODIUM 2 G: 2 INJECTION, POWDER, FOR SOLUTION INTRAMUSCULAR; INTRAVENOUS at 07:01

## 2021-01-26 LAB
GENTAMICIN PEAK SERPL-MCNC: <0.5 UG/ML (ref 5–30)
GENTAMICIN TROUGH SERPL-MCNC: <0.5 UG/ML (ref 0–2)

## 2021-01-26 PROCEDURE — 96361 HYDRATE IV INFUSION ADD-ON: CPT

## 2021-01-26 PROCEDURE — 96375 TX/PRO/DX INJ NEW DRUG ADDON: CPT

## 2021-01-26 PROCEDURE — 80170 ASSAY OF GENTAMICIN: CPT

## 2021-01-26 PROCEDURE — 25000003 PHARM REV CODE 250: Performed by: STUDENT IN AN ORGANIZED HEALTH CARE EDUCATION/TRAINING PROGRAM

## 2021-01-26 PROCEDURE — 96365 THER/PROPH/DIAG IV INF INIT: CPT

## 2021-01-26 PROCEDURE — 25000003 PHARM REV CODE 250: Performed by: OBSTETRICS & GYNECOLOGY

## 2021-01-26 PROCEDURE — 25000003 PHARM REV CODE 250: Performed by: ADVANCED PRACTICE MIDWIFE

## 2021-01-26 PROCEDURE — 99232 SBSQ HOSP IP/OBS MODERATE 35: CPT | Mod: ,,, | Performed by: OBSTETRICS & GYNECOLOGY

## 2021-01-26 PROCEDURE — 63600175 PHARM REV CODE 636 W HCPCS: Performed by: STUDENT IN AN ORGANIZED HEALTH CARE EDUCATION/TRAINING PROGRAM

## 2021-01-26 PROCEDURE — 99232 PR SUBSEQUENT HOSPITAL CARE,LEVL II: ICD-10-PCS | Mod: ,,, | Performed by: OBSTETRICS & GYNECOLOGY

## 2021-01-26 PROCEDURE — G0378 HOSPITAL OBSERVATION PER HR: HCPCS

## 2021-01-26 PROCEDURE — 80170 ASSAY OF GENTAMICIN: CPT | Mod: 91

## 2021-01-26 PROCEDURE — 96376 TX/PRO/DX INJ SAME DRUG ADON: CPT

## 2021-01-26 PROCEDURE — 63600175 PHARM REV CODE 636 W HCPCS: Performed by: OBSTETRICS & GYNECOLOGY

## 2021-01-26 RX ORDER — LURASIDONE HYDROCHLORIDE 20 MG/1
20 TABLET, FILM COATED ORAL
Status: DISCONTINUED | OUTPATIENT
Start: 2021-01-26 | End: 2021-01-27 | Stop reason: HOSPADM

## 2021-01-26 RX ORDER — FERROUS GLUCONATE 324(38)MG
324 TABLET ORAL 2 TIMES DAILY WITH MEALS
Status: DISCONTINUED | OUTPATIENT
Start: 2021-01-26 | End: 2021-01-27 | Stop reason: HOSPADM

## 2021-01-26 RX ORDER — GENTAMICIN SULFATE 40 MG/ML
INJECTION, SOLUTION INTRAMUSCULAR; INTRAVENOUS
Status: COMPLETED
Start: 2021-01-26 | End: 2021-01-26

## 2021-01-26 RX ORDER — PRENATAL WITH FERROUS FUM AND FOLIC ACID 3080; 920; 120; 400; 22; 1.84; 3; 20; 10; 1; 12; 200; 27; 25; 2 [IU]/1; [IU]/1; MG/1; [IU]/1; MG/1; MG/1; MG/1; MG/1; MG/1; MG/1; UG/1; MG/1; MG/1; MG/1; MG/1
1 TABLET ORAL DAILY
Status: DISCONTINUED | OUTPATIENT
Start: 2021-01-26 | End: 2021-01-27 | Stop reason: HOSPADM

## 2021-01-26 RX ADMIN — PRENATAL VITAMINS-IRON FUMARATE 27 MG IRON-FOLIC ACID 0.8 MG TABLET 1 TABLET: at 09:01

## 2021-01-26 RX ADMIN — FERROUS GLUCONATE 324 MG: 324 TABLET ORAL at 09:01

## 2021-01-26 RX ADMIN — SODIUM CHLORIDE 146 MG: 9 INJECTION, SOLUTION INTRAVENOUS at 03:01

## 2021-01-26 RX ADMIN — FERROUS GLUCONATE 324 MG: 324 TABLET ORAL at 04:01

## 2021-01-26 RX ADMIN — AMPICILLIN SODIUM 2 G: 2 INJECTION, POWDER, FOR SOLUTION INTRAMUSCULAR; INTRAVENOUS at 01:01

## 2021-01-26 RX ADMIN — CEFTRIAXONE 1 G: 1 INJECTION, SOLUTION INTRAVENOUS at 09:01

## 2021-01-26 RX ADMIN — AMPICILLIN SODIUM 2 G: 2 INJECTION, POWDER, FOR SOLUTION INTRAMUSCULAR; INTRAVENOUS at 06:01

## 2021-01-26 RX ADMIN — SODIUM CHLORIDE: 0.9 INJECTION, SOLUTION INTRAVENOUS at 04:01

## 2021-01-26 RX ADMIN — SODIUM CHLORIDE 125 ML/HR: 0.9 INJECTION, SOLUTION INTRAVENOUS at 06:01

## 2021-01-26 RX ADMIN — LURASIDONE HYDROCHLORIDE 20 MG: 20 TABLET, FILM COATED ORAL at 04:01

## 2021-01-27 VITALS
WEIGHT: 198.44 LBS | HEIGHT: 67 IN | RESPIRATION RATE: 20 BRPM | DIASTOLIC BLOOD PRESSURE: 77 MMHG | SYSTOLIC BLOOD PRESSURE: 122 MMHG | HEART RATE: 90 BPM | TEMPERATURE: 98 F | OXYGEN SATURATION: 100 % | BODY MASS INDEX: 31.15 KG/M2

## 2021-01-27 PROCEDURE — 25000003 PHARM REV CODE 250: Performed by: OBSTETRICS & GYNECOLOGY

## 2021-01-27 PROCEDURE — 25000003 PHARM REV CODE 250: Performed by: ADVANCED PRACTICE MIDWIFE

## 2021-01-27 PROCEDURE — 96361 HYDRATE IV INFUSION ADD-ON: CPT

## 2021-01-27 PROCEDURE — G0378 HOSPITAL OBSERVATION PER HR: HCPCS

## 2021-01-27 PROCEDURE — 96376 TX/PRO/DX INJ SAME DRUG ADON: CPT

## 2021-01-27 PROCEDURE — 63600175 PHARM REV CODE 636 W HCPCS: Performed by: OBSTETRICS & GYNECOLOGY

## 2021-01-27 RX ORDER — AMOXICILLIN AND CLAVULANATE POTASSIUM 875; 125 MG/1; MG/1
1 TABLET, FILM COATED ORAL EVERY 12 HOURS
Qty: 28 TABLET | Refills: 0 | Status: SHIPPED | OUTPATIENT
Start: 2021-01-27 | End: 2021-02-10

## 2021-01-27 RX ORDER — NITROFURANTOIN 25; 75 MG/1; MG/1
100 CAPSULE ORAL DAILY
Qty: 30 CAPSULE | Refills: 3 | Status: SHIPPED | OUTPATIENT
Start: 2021-01-27 | End: 2021-02-26

## 2021-01-27 RX ADMIN — SODIUM CHLORIDE: 0.9 INJECTION, SOLUTION INTRAVENOUS at 01:01

## 2021-01-27 RX ADMIN — PRENATAL VITAMINS-IRON FUMARATE 27 MG IRON-FOLIC ACID 0.8 MG TABLET 1 TABLET: at 09:01

## 2021-01-27 RX ADMIN — CEFTRIAXONE 1 G: 1 INJECTION, SOLUTION INTRAVENOUS at 09:01

## 2021-01-27 RX ADMIN — FERROUS GLUCONATE 324 MG: 324 TABLET ORAL at 09:01

## 2021-01-28 LAB — BACTERIA UR CULT: ABNORMAL

## 2021-02-10 PROBLEM — D50.9 IRON DEFICIENCY ANEMIA DURING PREGNANCY: Status: ACTIVE | Noted: 2021-02-10

## 2021-02-10 PROBLEM — O99.019 IRON DEFICIENCY ANEMIA DURING PREGNANCY: Status: ACTIVE | Noted: 2021-02-10

## 2021-02-11 PROBLEM — F33.2 SEVERE EPISODE OF RECURRENT MAJOR DEPRESSIVE DISORDER, WITHOUT PSYCHOTIC FEATURES: Status: RESOLVED | Noted: 2017-07-14 | Resolved: 2021-02-11

## 2021-02-11 PROBLEM — A74.9 CHLAMYDIA: Status: RESOLVED | Noted: 2019-01-30 | Resolved: 2021-02-11

## 2021-02-11 PROBLEM — O16.3 HYPERTENSION AFFECTING PREGNANCY IN THIRD TRIMESTER: Status: RESOLVED | Noted: 2020-12-11 | Resolved: 2021-02-11

## 2021-02-11 PROBLEM — N30.00 ACUTE CYSTITIS WITHOUT HEMATURIA: Status: RESOLVED | Noted: 2020-11-06 | Resolved: 2021-02-11

## 2021-02-11 PROBLEM — Z3A.24 24 WEEKS GESTATION OF PREGNANCY: Status: RESOLVED | Noted: 2021-01-12 | Resolved: 2021-02-11

## 2021-02-11 PROBLEM — E87.6 HYPOKALEMIA: Status: RESOLVED | Noted: 2017-07-10 | Resolved: 2021-02-11

## 2021-03-11 PROBLEM — O47.9 IRREGULAR UTERINE CONTRACTIONS: Status: ACTIVE | Noted: 2021-03-11

## 2021-03-26 PROBLEM — M54.50 LOWER BACK PAIN: Status: ACTIVE | Noted: 2021-03-26

## 2021-04-06 PROBLEM — O16.3 HYPERTENSION AFFECTING PREGNANCY IN THIRD TRIMESTER: Status: ACTIVE | Noted: 2021-04-06

## 2021-04-08 PROBLEM — Z67.20 TYPE B BLOOD, RH POSITIVE: Status: ACTIVE | Noted: 2021-04-08

## 2021-04-08 PROBLEM — O23.02 PYELONEPHRITIS AFFECTING PREGNANCY IN SECOND TRIMESTER: Status: RESOLVED | Noted: 2021-01-25 | Resolved: 2021-04-08

## 2021-04-21 PROBLEM — R10.9 FLANK PAIN IN PREGNANT PATIENT: Status: RESOLVED | Noted: 2021-01-25 | Resolved: 2021-04-21

## 2021-04-21 PROBLEM — R11.0 NAUSEA: Status: RESOLVED | Noted: 2020-10-22 | Resolved: 2021-04-21

## 2021-04-21 PROBLEM — M54.50 LOWER BACK PAIN: Status: RESOLVED | Noted: 2021-03-26 | Resolved: 2021-04-21

## 2021-04-21 PROBLEM — Z37.9 NORMAL LABOR: Status: ACTIVE | Noted: 2021-04-21

## 2021-04-21 PROBLEM — O47.9 IRREGULAR UTERINE CONTRACTIONS: Status: RESOLVED | Noted: 2021-03-11 | Resolved: 2021-04-21

## 2021-04-21 PROBLEM — O16.3 HYPERTENSION AFFECTING PREGNANCY IN THIRD TRIMESTER: Status: RESOLVED | Noted: 2021-04-06 | Resolved: 2021-04-21

## 2021-04-21 PROBLEM — O26.899 FLANK PAIN IN PREGNANT PATIENT: Status: RESOLVED | Noted: 2021-01-25 | Resolved: 2021-04-21

## 2021-05-04 ENCOUNTER — PATIENT MESSAGE (OUTPATIENT)
Dept: RESEARCH | Facility: HOSPITAL | Age: 21
End: 2021-05-04

## 2021-05-10 ENCOUNTER — PATIENT MESSAGE (OUTPATIENT)
Dept: RESEARCH | Facility: HOSPITAL | Age: 21
End: 2021-05-10

## 2021-05-28 ENCOUNTER — HOSPITAL ENCOUNTER (INPATIENT)
Facility: HOSPITAL | Age: 21
LOS: 6 days | Discharge: HOME | DRG: 885 | End: 2021-06-03
Attending: PSYCHIATRY & NEUROLOGY | Admitting: PSYCHIATRY & NEUROLOGY
Payer: MEDICAID

## 2021-05-28 DIAGNOSIS — R45.851 DEPRESSION WITH SUICIDAL IDEATION: ICD-10-CM

## 2021-05-28 DIAGNOSIS — F32.A DEPRESSION WITH SUICIDAL IDEATION: ICD-10-CM

## 2021-05-28 DIAGNOSIS — F33.2 SEVERE EPISODE OF RECURRENT MAJOR DEPRESSIVE DISORDER, WITHOUT PSYCHOTIC FEATURES: Primary | ICD-10-CM

## 2021-05-28 PROCEDURE — 11400000 HC PSYCH PRIVATE ROOM

## 2021-05-28 PROCEDURE — 83036 HEMOGLOBIN GLYCOSYLATED A1C: CPT | Performed by: PSYCHIATRY & NEUROLOGY

## 2021-05-28 RX ORDER — OLANZAPINE 10 MG/1
10 TABLET ORAL EVERY 6 HOURS PRN
Status: DISCONTINUED | OUTPATIENT
Start: 2021-05-28 | End: 2021-06-03 | Stop reason: HOSPADM

## 2021-05-28 RX ORDER — HYDROXYZINE PAMOATE 50 MG/1
50 CAPSULE ORAL EVERY 6 HOURS PRN
Status: DISCONTINUED | OUTPATIENT
Start: 2021-05-28 | End: 2021-06-03 | Stop reason: HOSPADM

## 2021-05-28 RX ORDER — MAG HYDROX/ALUMINUM HYD/SIMETH 200-200-20
30 SUSPENSION, ORAL (FINAL DOSE FORM) ORAL EVERY 6 HOURS PRN
Status: DISCONTINUED | OUTPATIENT
Start: 2021-05-28 | End: 2021-06-03 | Stop reason: HOSPADM

## 2021-05-28 RX ORDER — DOCUSATE SODIUM 100 MG/1
100 CAPSULE, LIQUID FILLED ORAL DAILY PRN
Status: DISCONTINUED | OUTPATIENT
Start: 2021-05-28 | End: 2021-06-03 | Stop reason: HOSPADM

## 2021-05-28 RX ORDER — OLANZAPINE 10 MG/2ML
10 INJECTION, POWDER, FOR SOLUTION INTRAMUSCULAR EVERY 6 HOURS PRN
Status: DISCONTINUED | OUTPATIENT
Start: 2021-05-28 | End: 2021-06-03 | Stop reason: HOSPADM

## 2021-05-28 RX ORDER — FOLIC ACID 1 MG/1
1 TABLET ORAL DAILY
Status: DISCONTINUED | OUTPATIENT
Start: 2021-05-29 | End: 2021-06-03 | Stop reason: HOSPADM

## 2021-05-28 RX ORDER — LOPERAMIDE HYDROCHLORIDE 2 MG/1
2 CAPSULE ORAL
Status: DISCONTINUED | OUTPATIENT
Start: 2021-05-28 | End: 2021-06-03 | Stop reason: HOSPADM

## 2021-05-28 RX ORDER — IBUPROFEN 200 MG
1 TABLET ORAL DAILY PRN
Status: DISCONTINUED | OUTPATIENT
Start: 2021-05-28 | End: 2021-06-03 | Stop reason: HOSPADM

## 2021-05-28 RX ORDER — ACETAMINOPHEN 325 MG/1
650 TABLET ORAL EVERY 6 HOURS PRN
Status: DISCONTINUED | OUTPATIENT
Start: 2021-05-28 | End: 2021-06-03 | Stop reason: HOSPADM

## 2021-05-29 LAB
ESTIMATED AVG GLUCOSE: 91 MG/DL (ref 68–131)
HBA1C MFR BLD: 4.8 % (ref 4–5.6)

## 2021-05-29 PROCEDURE — 99223 1ST HOSP IP/OBS HIGH 75: CPT | Mod: ,,, | Performed by: PSYCHIATRY & NEUROLOGY

## 2021-05-29 PROCEDURE — 90833 PSYTX W PT W E/M 30 MIN: CPT | Mod: ,,, | Performed by: PSYCHIATRY & NEUROLOGY

## 2021-05-29 PROCEDURE — 25000003 PHARM REV CODE 250: Performed by: PSYCHIATRY & NEUROLOGY

## 2021-05-29 PROCEDURE — 90833 PR PSYCHOTHERAPY W/PATIENT W/E&M, 30 MIN (ADD ON): ICD-10-PCS | Mod: ,,, | Performed by: PSYCHIATRY & NEUROLOGY

## 2021-05-29 PROCEDURE — 99232 SBSQ HOSP IP/OBS MODERATE 35: CPT | Mod: ,,, | Performed by: FAMILY MEDICINE

## 2021-05-29 PROCEDURE — 36415 COLL VENOUS BLD VENIPUNCTURE: CPT | Performed by: PSYCHIATRY & NEUROLOGY

## 2021-05-29 PROCEDURE — 99223 PR INITIAL HOSPITAL CARE,LEVL III: ICD-10-PCS | Mod: ,,, | Performed by: PSYCHIATRY & NEUROLOGY

## 2021-05-29 PROCEDURE — 11400000 HC PSYCH PRIVATE ROOM

## 2021-05-29 PROCEDURE — 99232 PR SUBSEQUENT HOSPITAL CARE,LEVL II: ICD-10-PCS | Mod: ,,, | Performed by: FAMILY MEDICINE

## 2021-05-29 RX ORDER — ARIPIPRAZOLE 2 MG/1
2 TABLET ORAL DAILY
Status: DISCONTINUED | OUTPATIENT
Start: 2021-05-29 | End: 2021-05-31

## 2021-05-29 RX ORDER — SERTRALINE HYDROCHLORIDE 25 MG/1
25 TABLET, FILM COATED ORAL DAILY
Status: DISCONTINUED | OUTPATIENT
Start: 2021-05-29 | End: 2021-05-30

## 2021-05-29 RX ORDER — ASPIRIN 325 MG
50000 TABLET, DELAYED RELEASE (ENTERIC COATED) ORAL
Status: DISCONTINUED | OUTPATIENT
Start: 2021-05-29 | End: 2021-06-03 | Stop reason: HOSPADM

## 2021-05-29 RX ADMIN — THERA TABS 1 TABLET: TAB at 08:05

## 2021-05-29 RX ADMIN — ARIPIPRAZOLE 2 MG: 2 TABLET ORAL at 09:05

## 2021-05-29 RX ADMIN — ACETAMINOPHEN 650 MG: 325 TABLET ORAL at 09:05

## 2021-05-29 RX ADMIN — SERTRALINE HYDROCHLORIDE 25 MG: 25 TABLET ORAL at 09:05

## 2021-05-29 RX ADMIN — FOLIC ACID 1 MG: 1 TABLET ORAL at 08:05

## 2021-05-29 RX ADMIN — OFLOXACIN 50000 UNITS: 300 TABLET, COATED ORAL at 09:05

## 2021-05-30 PROCEDURE — 99233 SBSQ HOSP IP/OBS HIGH 50: CPT | Mod: ,,, | Performed by: PSYCHIATRY & NEUROLOGY

## 2021-05-30 PROCEDURE — 99233 PR SUBSEQUENT HOSPITAL CARE,LEVL III: ICD-10-PCS | Mod: ,,, | Performed by: PSYCHIATRY & NEUROLOGY

## 2021-05-30 PROCEDURE — 25000003 PHARM REV CODE 250: Performed by: PSYCHIATRY & NEUROLOGY

## 2021-05-30 PROCEDURE — 11400000 HC PSYCH PRIVATE ROOM

## 2021-05-30 PROCEDURE — 90833 PR PSYCHOTHERAPY W/PATIENT W/E&M, 30 MIN (ADD ON): ICD-10-PCS | Mod: ,,, | Performed by: PSYCHIATRY & NEUROLOGY

## 2021-05-30 PROCEDURE — 90833 PSYTX W PT W E/M 30 MIN: CPT | Mod: ,,, | Performed by: PSYCHIATRY & NEUROLOGY

## 2021-05-30 RX ORDER — SERTRALINE HYDROCHLORIDE 50 MG/1
50 TABLET, FILM COATED ORAL DAILY
Status: DISCONTINUED | OUTPATIENT
Start: 2021-05-31 | End: 2021-06-01

## 2021-05-30 RX ADMIN — FOLIC ACID 1 MG: 1 TABLET ORAL at 08:05

## 2021-05-30 RX ADMIN — SERTRALINE HYDROCHLORIDE 25 MG: 25 TABLET ORAL at 08:05

## 2021-05-30 RX ADMIN — ACETAMINOPHEN 650 MG: 325 TABLET ORAL at 10:05

## 2021-05-30 RX ADMIN — ARIPIPRAZOLE 2 MG: 2 TABLET ORAL at 08:05

## 2021-05-30 RX ADMIN — THERA TABS 1 TABLET: TAB at 08:05

## 2021-05-31 PROCEDURE — 99233 PR SUBSEQUENT HOSPITAL CARE,LEVL III: ICD-10-PCS | Mod: ,,, | Performed by: PSYCHIATRY & NEUROLOGY

## 2021-05-31 PROCEDURE — 25000003 PHARM REV CODE 250: Performed by: PSYCHIATRY & NEUROLOGY

## 2021-05-31 PROCEDURE — 11400000 HC PSYCH PRIVATE ROOM

## 2021-05-31 PROCEDURE — 99233 SBSQ HOSP IP/OBS HIGH 50: CPT | Mod: ,,, | Performed by: PSYCHIATRY & NEUROLOGY

## 2021-05-31 PROCEDURE — 90833 PSYTX W PT W E/M 30 MIN: CPT | Mod: ,,, | Performed by: PSYCHIATRY & NEUROLOGY

## 2021-05-31 PROCEDURE — 90833 PR PSYCHOTHERAPY W/PATIENT W/E&M, 30 MIN (ADD ON): ICD-10-PCS | Mod: ,,, | Performed by: PSYCHIATRY & NEUROLOGY

## 2021-05-31 RX ORDER — ARIPIPRAZOLE 5 MG/1
5 TABLET ORAL DAILY
Status: DISCONTINUED | OUTPATIENT
Start: 2021-06-01 | End: 2021-06-03 | Stop reason: HOSPADM

## 2021-05-31 RX ADMIN — ACETAMINOPHEN 650 MG: 325 TABLET ORAL at 01:05

## 2021-05-31 RX ADMIN — THERA TABS 1 TABLET: TAB at 08:05

## 2021-05-31 RX ADMIN — ARIPIPRAZOLE 2 MG: 2 TABLET ORAL at 08:05

## 2021-05-31 RX ADMIN — ACETAMINOPHEN 650 MG: 325 TABLET ORAL at 08:05

## 2021-05-31 RX ADMIN — FOLIC ACID 1 MG: 1 TABLET ORAL at 08:05

## 2021-05-31 RX ADMIN — SERTRALINE HYDROCHLORIDE 50 MG: 50 TABLET ORAL at 08:05

## 2021-06-01 PROCEDURE — 25000003 PHARM REV CODE 250: Performed by: PSYCHIATRY & NEUROLOGY

## 2021-06-01 PROCEDURE — 99233 SBSQ HOSP IP/OBS HIGH 50: CPT | Mod: ,,, | Performed by: PSYCHIATRY & NEUROLOGY

## 2021-06-01 PROCEDURE — 11400000 HC PSYCH PRIVATE ROOM

## 2021-06-01 PROCEDURE — 99233 PR SUBSEQUENT HOSPITAL CARE,LEVL III: ICD-10-PCS | Mod: ,,, | Performed by: PSYCHIATRY & NEUROLOGY

## 2021-06-01 RX ADMIN — THERA TABS 1 TABLET: TAB at 08:06

## 2021-06-01 RX ADMIN — ARIPIPRAZOLE 5 MG: 5 TABLET ORAL at 08:06

## 2021-06-01 RX ADMIN — SERTRALINE HYDROCHLORIDE 50 MG: 50 TABLET ORAL at 08:06

## 2021-06-01 RX ADMIN — FOLIC ACID 1 MG: 1 TABLET ORAL at 08:06

## 2021-06-02 PROBLEM — R45.851 DEPRESSION WITH SUICIDAL IDEATION: Status: RESOLVED | Noted: 2021-05-28 | Resolved: 2021-06-02

## 2021-06-02 PROBLEM — F32.A DEPRESSION WITH SUICIDAL IDEATION: Status: RESOLVED | Noted: 2021-05-28 | Resolved: 2021-06-02

## 2021-06-02 PROCEDURE — 99233 PR SUBSEQUENT HOSPITAL CARE,LEVL III: ICD-10-PCS | Mod: ,,, | Performed by: PSYCHIATRY & NEUROLOGY

## 2021-06-02 PROCEDURE — 11400000 HC PSYCH PRIVATE ROOM

## 2021-06-02 PROCEDURE — 25000003 PHARM REV CODE 250: Performed by: PSYCHIATRY & NEUROLOGY

## 2021-06-02 PROCEDURE — 99233 SBSQ HOSP IP/OBS HIGH 50: CPT | Mod: ,,, | Performed by: PSYCHIATRY & NEUROLOGY

## 2021-06-02 PROCEDURE — 90833 PSYTX W PT W E/M 30 MIN: CPT | Mod: ,,, | Performed by: PSYCHIATRY & NEUROLOGY

## 2021-06-02 PROCEDURE — 90833 PR PSYCHOTHERAPY W/PATIENT W/E&M, 30 MIN (ADD ON): ICD-10-PCS | Mod: ,,, | Performed by: PSYCHIATRY & NEUROLOGY

## 2021-06-02 RX ORDER — IBUPROFEN 200 MG
1 TABLET ORAL DAILY
Status: ON HOLD | COMMUNITY
Start: 2021-06-02 | End: 2021-07-05 | Stop reason: HOSPADM

## 2021-06-02 RX ORDER — ARIPIPRAZOLE 5 MG/1
5 TABLET ORAL DAILY
Qty: 30 TABLET | Refills: 2 | Status: ON HOLD | OUTPATIENT
Start: 2021-06-03 | End: 2021-07-05 | Stop reason: SDUPTHER

## 2021-06-02 RX ORDER — SERTRALINE HYDROCHLORIDE 100 MG/1
100 TABLET, FILM COATED ORAL DAILY
Status: DISCONTINUED | OUTPATIENT
Start: 2021-06-03 | End: 2021-06-03 | Stop reason: HOSPADM

## 2021-06-02 RX ORDER — ASPIRIN 325 MG
50000 TABLET, DELAYED RELEASE (ENTERIC COATED) ORAL
Qty: 7 CAPSULE | Refills: 0 | Status: ON HOLD | OUTPATIENT
Start: 2021-06-05 | End: 2023-01-09

## 2021-06-02 RX ORDER — SERTRALINE HYDROCHLORIDE 100 MG/1
100 TABLET, FILM COATED ORAL DAILY
Qty: 30 TABLET | Refills: 2 | Status: ON HOLD | OUTPATIENT
Start: 2021-06-03 | End: 2021-07-05 | Stop reason: SDUPTHER

## 2021-06-02 RX ADMIN — SERTRALINE HYDROCHLORIDE 75 MG: 50 TABLET ORAL at 08:06

## 2021-06-02 RX ADMIN — THERA TABS 1 TABLET: TAB at 08:06

## 2021-06-02 RX ADMIN — ARIPIPRAZOLE 5 MG: 5 TABLET ORAL at 08:06

## 2021-06-02 RX ADMIN — FOLIC ACID 1 MG: 1 TABLET ORAL at 08:06

## 2021-06-02 RX ADMIN — HYDROXYZINE PAMOATE 50 MG: 50 CAPSULE ORAL at 09:06

## 2021-06-03 VITALS
RESPIRATION RATE: 16 BRPM | HEIGHT: 67 IN | DIASTOLIC BLOOD PRESSURE: 70 MMHG | SYSTOLIC BLOOD PRESSURE: 114 MMHG | HEART RATE: 75 BPM | BODY MASS INDEX: 27.27 KG/M2 | WEIGHT: 173.75 LBS | TEMPERATURE: 98 F | OXYGEN SATURATION: 100 %

## 2021-06-03 PROCEDURE — 25000003 PHARM REV CODE 250: Performed by: PSYCHIATRY & NEUROLOGY

## 2021-06-03 PROCEDURE — 99239 PR HOSPITAL DISCHARGE DAY,>30 MIN: ICD-10-PCS | Mod: ,,, | Performed by: PSYCHIATRY & NEUROLOGY

## 2021-06-03 PROCEDURE — 99239 HOSP IP/OBS DSCHRG MGMT >30: CPT | Mod: ,,, | Performed by: PSYCHIATRY & NEUROLOGY

## 2021-06-03 RX ADMIN — FOLIC ACID 1 MG: 1 TABLET ORAL at 08:06

## 2021-06-03 RX ADMIN — THERA TABS 1 TABLET: TAB at 08:06

## 2021-06-03 RX ADMIN — ARIPIPRAZOLE 5 MG: 5 TABLET ORAL at 09:06

## 2021-06-03 RX ADMIN — SERTRALINE HYDROCHLORIDE 100 MG: 100 TABLET ORAL at 08:06

## 2021-07-02 ENCOUNTER — HOSPITAL ENCOUNTER (INPATIENT)
Facility: HOSPITAL | Age: 21
LOS: 4 days | Discharge: HOME | DRG: 885 | End: 2021-07-06
Attending: PSYCHIATRY & NEUROLOGY | Admitting: PSYCHIATRY & NEUROLOGY
Payer: MEDICAID

## 2021-07-02 DIAGNOSIS — F32.A DEPRESSION WITH SUICIDAL IDEATION: Primary | ICD-10-CM

## 2021-07-02 DIAGNOSIS — R45.851 DEPRESSION WITH SUICIDAL IDEATION: Primary | ICD-10-CM

## 2021-07-02 PROCEDURE — 83036 HEMOGLOBIN GLYCOSYLATED A1C: CPT | Performed by: PSYCHIATRY & NEUROLOGY

## 2021-07-02 PROCEDURE — 11400000 HC PSYCH PRIVATE ROOM

## 2021-07-02 RX ORDER — FOLIC ACID 1 MG/1
1 TABLET ORAL DAILY
Status: DISCONTINUED | OUTPATIENT
Start: 2021-07-03 | End: 2021-07-06 | Stop reason: HOSPADM

## 2021-07-02 RX ORDER — HYDROXYZINE PAMOATE 50 MG/1
50 CAPSULE ORAL EVERY 6 HOURS PRN
Status: DISCONTINUED | OUTPATIENT
Start: 2021-07-02 | End: 2021-07-06 | Stop reason: HOSPADM

## 2021-07-02 RX ORDER — DOCUSATE SODIUM 100 MG/1
100 CAPSULE, LIQUID FILLED ORAL DAILY PRN
Status: DISCONTINUED | OUTPATIENT
Start: 2021-07-02 | End: 2021-07-06 | Stop reason: HOSPADM

## 2021-07-02 RX ORDER — IBUPROFEN 200 MG
1 TABLET ORAL DAILY PRN
Status: DISCONTINUED | OUTPATIENT
Start: 2021-07-02 | End: 2021-07-06 | Stop reason: HOSPADM

## 2021-07-02 RX ORDER — OLANZAPINE 10 MG/1
10 TABLET ORAL EVERY 6 HOURS PRN
Status: DISCONTINUED | OUTPATIENT
Start: 2021-07-02 | End: 2021-07-06 | Stop reason: HOSPADM

## 2021-07-02 RX ORDER — LOPERAMIDE HYDROCHLORIDE 2 MG/1
2 CAPSULE ORAL
Status: DISCONTINUED | OUTPATIENT
Start: 2021-07-02 | End: 2021-07-06 | Stop reason: HOSPADM

## 2021-07-02 RX ORDER — ACETAMINOPHEN 325 MG/1
650 TABLET ORAL EVERY 6 HOURS PRN
Status: DISCONTINUED | OUTPATIENT
Start: 2021-07-02 | End: 2021-07-06 | Stop reason: HOSPADM

## 2021-07-02 RX ORDER — OLANZAPINE 10 MG/2ML
10 INJECTION, POWDER, FOR SOLUTION INTRAMUSCULAR EVERY 6 HOURS PRN
Status: DISCONTINUED | OUTPATIENT
Start: 2021-07-02 | End: 2021-07-06 | Stop reason: HOSPADM

## 2021-07-02 RX ORDER — MAG HYDROX/ALUMINUM HYD/SIMETH 200-200-20
30 SUSPENSION, ORAL (FINAL DOSE FORM) ORAL EVERY 6 HOURS PRN
Status: DISCONTINUED | OUTPATIENT
Start: 2021-07-02 | End: 2021-07-06 | Stop reason: HOSPADM

## 2021-07-03 PROBLEM — Z20.2 STD EXPOSURE: Status: ACTIVE | Noted: 2021-07-03

## 2021-07-03 LAB
ESTIMATED AVG GLUCOSE: 91 MG/DL (ref 68–131)
HBA1C MFR BLD: 4.8 % (ref 4–5.6)

## 2021-07-03 PROCEDURE — 99223 1ST HOSP IP/OBS HIGH 75: CPT | Mod: ,,, | Performed by: STUDENT IN AN ORGANIZED HEALTH CARE EDUCATION/TRAINING PROGRAM

## 2021-07-03 PROCEDURE — 25000003 PHARM REV CODE 250: Performed by: STUDENT IN AN ORGANIZED HEALTH CARE EDUCATION/TRAINING PROGRAM

## 2021-07-03 PROCEDURE — 99223 PR INITIAL HOSPITAL CARE,LEVL III: ICD-10-PCS | Mod: ,,, | Performed by: STUDENT IN AN ORGANIZED HEALTH CARE EDUCATION/TRAINING PROGRAM

## 2021-07-03 PROCEDURE — 99221 1ST HOSP IP/OBS SF/LOW 40: CPT | Mod: ,,, | Performed by: STUDENT IN AN ORGANIZED HEALTH CARE EDUCATION/TRAINING PROGRAM

## 2021-07-03 PROCEDURE — 11400000 HC PSYCH PRIVATE ROOM

## 2021-07-03 PROCEDURE — 25000003 PHARM REV CODE 250: Performed by: PSYCHIATRY & NEUROLOGY

## 2021-07-03 PROCEDURE — 99221 PR INITIAL HOSPITAL CARE,LEVL I: ICD-10-PCS | Mod: ,,, | Performed by: STUDENT IN AN ORGANIZED HEALTH CARE EDUCATION/TRAINING PROGRAM

## 2021-07-03 PROCEDURE — 36415 COLL VENOUS BLD VENIPUNCTURE: CPT | Performed by: PSYCHIATRY & NEUROLOGY

## 2021-07-03 PROCEDURE — 90833 PSYTX W PT W E/M 30 MIN: CPT | Mod: ,,, | Performed by: STUDENT IN AN ORGANIZED HEALTH CARE EDUCATION/TRAINING PROGRAM

## 2021-07-03 PROCEDURE — 90833 PR PSYCHOTHERAPY W/PATIENT W/E&M, 30 MIN (ADD ON): ICD-10-PCS | Mod: ,,, | Performed by: STUDENT IN AN ORGANIZED HEALTH CARE EDUCATION/TRAINING PROGRAM

## 2021-07-03 RX ORDER — ARIPIPRAZOLE 5 MG/1
5 TABLET ORAL DAILY
Status: DISCONTINUED | OUTPATIENT
Start: 2021-07-03 | End: 2021-07-06 | Stop reason: HOSPADM

## 2021-07-03 RX ORDER — SERTRALINE HYDROCHLORIDE 100 MG/1
100 TABLET, FILM COATED ORAL DAILY
Status: DISCONTINUED | OUTPATIENT
Start: 2021-07-03 | End: 2021-07-06 | Stop reason: HOSPADM

## 2021-07-03 RX ADMIN — SERTRALINE HYDROCHLORIDE 100 MG: 100 TABLET ORAL at 12:07

## 2021-07-03 RX ADMIN — FOLIC ACID 1 MG: 1 TABLET ORAL at 08:07

## 2021-07-03 RX ADMIN — THERA TABS 1 TABLET: TAB at 08:07

## 2021-07-03 RX ADMIN — ARIPIPRAZOLE 5 MG: 5 TABLET ORAL at 12:07

## 2021-07-04 PROCEDURE — 99233 PR SUBSEQUENT HOSPITAL CARE,LEVL III: ICD-10-PCS | Mod: ,,, | Performed by: STUDENT IN AN ORGANIZED HEALTH CARE EDUCATION/TRAINING PROGRAM

## 2021-07-04 PROCEDURE — 11400000 HC PSYCH PRIVATE ROOM

## 2021-07-04 PROCEDURE — 25000003 PHARM REV CODE 250: Performed by: STUDENT IN AN ORGANIZED HEALTH CARE EDUCATION/TRAINING PROGRAM

## 2021-07-04 PROCEDURE — 87591 N.GONORRHOEAE DNA AMP PROB: CPT | Performed by: STUDENT IN AN ORGANIZED HEALTH CARE EDUCATION/TRAINING PROGRAM

## 2021-07-04 PROCEDURE — 99233 SBSQ HOSP IP/OBS HIGH 50: CPT | Mod: ,,, | Performed by: STUDENT IN AN ORGANIZED HEALTH CARE EDUCATION/TRAINING PROGRAM

## 2021-07-04 PROCEDURE — 87491 CHLMYD TRACH DNA AMP PROBE: CPT | Performed by: STUDENT IN AN ORGANIZED HEALTH CARE EDUCATION/TRAINING PROGRAM

## 2021-07-04 RX ADMIN — SERTRALINE HYDROCHLORIDE 100 MG: 100 TABLET ORAL at 08:07

## 2021-07-04 RX ADMIN — ARIPIPRAZOLE 5 MG: 5 TABLET ORAL at 08:07

## 2021-07-04 RX ADMIN — FOLIC ACID 1 MG: 1 TABLET ORAL at 08:07

## 2021-07-04 RX ADMIN — THERA TABS 1 TABLET: TAB at 08:07

## 2021-07-05 PROBLEM — F32.A DEPRESSION WITH SUICIDAL IDEATION: Status: RESOLVED | Noted: 2021-07-02 | Resolved: 2021-07-05

## 2021-07-05 PROBLEM — R45.851 DEPRESSION WITH SUICIDAL IDEATION: Status: RESOLVED | Noted: 2021-07-02 | Resolved: 2021-07-05

## 2021-07-05 PROBLEM — F32.9 MAJOR DEPRESSION: Status: RESOLVED | Noted: 2018-12-01 | Resolved: 2021-07-05

## 2021-07-05 PROCEDURE — 11400000 HC PSYCH PRIVATE ROOM

## 2021-07-05 PROCEDURE — 25000003 PHARM REV CODE 250: Performed by: STUDENT IN AN ORGANIZED HEALTH CARE EDUCATION/TRAINING PROGRAM

## 2021-07-05 PROCEDURE — 99233 PR SUBSEQUENT HOSPITAL CARE,LEVL III: ICD-10-PCS | Mod: ,,, | Performed by: PSYCHIATRY & NEUROLOGY

## 2021-07-05 PROCEDURE — 99233 SBSQ HOSP IP/OBS HIGH 50: CPT | Mod: ,,, | Performed by: PSYCHIATRY & NEUROLOGY

## 2021-07-05 PROCEDURE — 90833 PR PSYCHOTHERAPY W/PATIENT W/E&M, 30 MIN (ADD ON): ICD-10-PCS | Mod: ,,, | Performed by: PSYCHIATRY & NEUROLOGY

## 2021-07-05 PROCEDURE — 90833 PSYTX W PT W E/M 30 MIN: CPT | Mod: ,,, | Performed by: PSYCHIATRY & NEUROLOGY

## 2021-07-05 RX ORDER — ARIPIPRAZOLE 5 MG/1
5 TABLET ORAL DAILY
Qty: 30 TABLET | Refills: 2 | Status: ON HOLD | OUTPATIENT
Start: 2021-07-05 | End: 2023-01-09

## 2021-07-05 RX ORDER — IBUPROFEN 200 MG
1 TABLET ORAL DAILY
Status: ON HOLD | COMMUNITY
Start: 2021-07-05 | End: 2023-01-09

## 2021-07-05 RX ORDER — SERTRALINE HYDROCHLORIDE 100 MG/1
100 TABLET, FILM COATED ORAL DAILY
Qty: 30 TABLET | Refills: 2 | Status: ON HOLD | OUTPATIENT
Start: 2021-07-05 | End: 2023-01-09

## 2021-07-05 RX ADMIN — HYDROXYZINE PAMOATE 50 MG: 50 CAPSULE ORAL at 08:07

## 2021-07-05 RX ADMIN — THERA TABS 1 TABLET: TAB at 08:07

## 2021-07-05 RX ADMIN — FOLIC ACID 1 MG: 1 TABLET ORAL at 08:07

## 2021-07-05 RX ADMIN — ARIPIPRAZOLE 5 MG: 5 TABLET ORAL at 08:07

## 2021-07-05 RX ADMIN — SERTRALINE HYDROCHLORIDE 100 MG: 100 TABLET ORAL at 08:07

## 2021-07-06 VITALS
DIASTOLIC BLOOD PRESSURE: 56 MMHG | TEMPERATURE: 97 F | WEIGHT: 175.69 LBS | RESPIRATION RATE: 16 BRPM | OXYGEN SATURATION: 100 % | HEART RATE: 82 BPM | HEIGHT: 66 IN | SYSTOLIC BLOOD PRESSURE: 114 MMHG | BODY MASS INDEX: 28.23 KG/M2

## 2021-07-06 PROCEDURE — 90833 PSYTX W PT W E/M 30 MIN: CPT | Mod: ,,, | Performed by: PSYCHIATRY & NEUROLOGY

## 2021-07-06 PROCEDURE — 99239 HOSP IP/OBS DSCHRG MGMT >30: CPT | Mod: ,,, | Performed by: PSYCHIATRY & NEUROLOGY

## 2021-07-06 PROCEDURE — 99239 PR HOSPITAL DISCHARGE DAY,>30 MIN: ICD-10-PCS | Mod: ,,, | Performed by: PSYCHIATRY & NEUROLOGY

## 2021-07-06 PROCEDURE — 25000003 PHARM REV CODE 250: Performed by: STUDENT IN AN ORGANIZED HEALTH CARE EDUCATION/TRAINING PROGRAM

## 2021-07-06 PROCEDURE — 90833 PR PSYCHOTHERAPY W/PATIENT W/E&M, 30 MIN (ADD ON): ICD-10-PCS | Mod: ,,, | Performed by: PSYCHIATRY & NEUROLOGY

## 2021-07-06 RX ADMIN — FOLIC ACID 1 MG: 1 TABLET ORAL at 08:07

## 2021-07-06 RX ADMIN — THERA TABS 1 TABLET: TAB at 08:07

## 2021-07-06 RX ADMIN — HYDROXYZINE PAMOATE 50 MG: 50 CAPSULE ORAL at 03:07

## 2021-07-06 RX ADMIN — SERTRALINE HYDROCHLORIDE 100 MG: 100 TABLET ORAL at 08:07

## 2021-07-06 RX ADMIN — ARIPIPRAZOLE 5 MG: 5 TABLET ORAL at 08:07

## 2021-07-07 LAB
C TRACH DNA SPEC QL NAA+PROBE: NOT DETECTED
N GONORRHOEA DNA SPEC QL NAA+PROBE: NOT DETECTED

## 2022-05-14 NOTE — TELEPHONE ENCOUNTER
----- Message from Ginny Higgins MA sent at 4/1/2020  1:32 PM CDT -----  Contact: self  Tammy Lopez  MRN: [id number]  Home Phone      [phone]  Work Phone      [phone]  Mobile          [phone]    Patient Care Team:  Sherice Ross NP as PCP - General (Family Medicine)  OB? No  What phone number can you be reached at?  [phone]  Message:  Needs to make appt for dx preg.       This note was copied from a baby's chart.  Breastfeeding Services Note:    Consult time spent with this mother/baby 16 minutes.    NICU Infant and Family seen as follow up visit for lactation consultation in preparation for Mom's discharge from Hospital.       Teaching reinforced in the following:    -Pumping minimums  -Using pumping log  -Pump options for home use  -Appropriate expectations for gestational age  -Pump flange size appropriate  -Community Resources     Reviewed above, mother continues with pumping and dumping milk. Getting good volumes. Discussed bedside pumping and transportation of milk to hospital once 2 weeks done. Is excited at the prospect of providing breastmilk to her babies. Encouraged to continue pumping every 2-3 hours, pump at least once at night. No other concerns.     Lactation will continue to follow.

## 2022-10-17 PROBLEM — O99.012 ANEMIA COMPLICATING PREGNANCY IN SECOND TRIMESTER: Status: ACTIVE | Noted: 2022-10-17

## 2025-04-19 ENCOUNTER — OFFICE VISIT (OUTPATIENT)
Dept: URGENT CARE | Facility: CLINIC | Age: 25
End: 2025-04-19
Payer: MEDICAID

## 2025-04-19 VITALS
DIASTOLIC BLOOD PRESSURE: 93 MMHG | WEIGHT: 290 LBS | TEMPERATURE: 98 F | RESPIRATION RATE: 18 BRPM | OXYGEN SATURATION: 99 % | HEIGHT: 67 IN | BODY MASS INDEX: 45.52 KG/M2 | HEART RATE: 86 BPM | SYSTOLIC BLOOD PRESSURE: 141 MMHG

## 2025-04-19 DIAGNOSIS — Z20.2 STD EXPOSURE: Primary | ICD-10-CM

## 2025-04-19 PROCEDURE — 99204 OFFICE O/P NEW MOD 45 MIN: CPT | Mod: S$GLB,,, | Performed by: FAMILY MEDICINE

## 2025-04-19 RX ORDER — ARIPIPRAZOLE LAUROXIL 441 MG/1.6ML
INJECTION, SUSPENSION, EXTENDED RELEASE INTRAMUSCULAR
COMMUNITY

## 2025-04-19 NOTE — LETTER
April 19, 2025  Ronda Lopez  [address] [address]                [facility]  [address]  [place] [place] [facility] [address]  Phone: [phone]  Fax: [phone] Ronda Lopez was seen and treated in our Urgent Care department on 4/19/2025. She may return to work in 2 - 3 days.      If you have any questions or concerns, please don't hesitate to call.        Sincerely,        Dov Griffith MD

## 2025-04-19 NOTE — PROGRESS NOTES
"Subjective:      Patient ID: Tammy Lopez is a 24 y.o. female.    Vitals:  height is 5' 7" (1.702 m) and weight is 131.5 kg (290 lb). Her oral temperature is 98.3 °F (36.8 °C). Her blood pressure is 141/93 (abnormal) and her pulse is 86. Her respiration is 18 and oxygen saturation is 99%.     Chief Complaint: Exposure to STD    23 y/o female presents to clinic with exposure to herpes. Last time her and her partner were intimate was 4/17. She was just informed about this yesterday and states they have not taken preventative medications for this. Patient mentions there are bumps on the inside of her oral lips. Only symptoms she is having. They are not painful and have been present for several days.    Patient states that her partner informed her that he had tested positive for Herpes on Thursday.  She does not know what kind of testing was done.  She states that he did not have genital lesions at the time of their encounter, and that he denies ever having Genital lesions.  She denies any current lesions or other complaints.        Exposure to STD   The patient's pertinent negatives include no discharge, dyspareunia, dysuria, genital itching, genital rash or pelvic pain. This is a new problem. The current episode started in the past 7 days. The patient is experiencing no pain.She reports no condom usage. The vaginal discharge was normal. Pertinent negatives include no chills, constipation, diarrhea, discolored urine, fatigue, fever, flank pain, genital odor, headaches, painful intercourse, rectal pain, urinary frequency or urinary retention. The symptoms are aggravated by: nothing.She has tried nothing for the symptoms.       Constitution: Negative. Negative for chills, fatigue and fever.   HENT: Negative.     Cardiovascular: Negative.    Eyes: Negative.    Respiratory: Negative.     Gastrointestinal: Negative.  Negative for constipation, diarrhea and rectal pain.   Endocrine: negative.   Genitourinary: Negative.  " Negative for dysuria, frequency, flank pain and pelvic pain.   Musculoskeletal: Negative.    Skin: Negative.    Allergic/Immunologic: Negative.    Neurological: Negative.  Negative for headaches.   Hematologic/Lymphatic: Negative.    Psychiatric/Behavioral: Negative.        Objective:     Physical Exam   Constitutional: She is oriented to person, place, and time. She appears well-developed. She is cooperative.   HENT:   Head: Normocephalic and atraumatic.   Ears:   Right Ear: Hearing, tympanic membrane, external ear and ear canal normal.   Left Ear: Hearing, tympanic membrane, external ear and ear canal normal.   Nose: Nose normal. No mucosal edema or nasal deformity. No epistaxis. Right sinus exhibits no maxillary sinus tenderness and no frontal sinus tenderness. Left sinus exhibits no maxillary sinus tenderness and no frontal sinus tenderness.   Mouth/Throat: Uvula is midline, oropharynx is clear and moist and mucous membranes are normal. No trismus in the jaw. Normal dentition. No uvula swelling.   Eyes: Conjunctivae and lids are normal.   Neck: Trachea normal and phonation normal. Neck supple.   Cardiovascular: Normal rate, regular rhythm, normal heart sounds and normal pulses.   Pulmonary/Chest: Effort normal and breath sounds normal.   Abdominal: Normal appearance and bowel sounds are normal. Soft.   Musculoskeletal: Normal range of motion.         General: Normal range of motion.   Neurological: She is alert and oriented to person, place, and time. She exhibits normal muscle tone.   Skin: Skin is warm, dry and intact.   Psychiatric: Her speech is normal and behavior is normal. Judgment and thought content normal.   Nursing note and vitals reviewed.      Assessment:     1. STD exposure        Plan:       STD exposure    Informed patient that based on the history given above, we have no testing to offer her at this time.  Informed her that the only real way to diagnose herpes is to culture active lesions at the  "time of outbreak.  Informed her that the "blood test" for Herpes is not diagnostic and is generally not recommended.  Informed her that the majority of adults in [place] will test Positive with the blood antibody testing.  Recommended that she watch closely for the development of lesions in the future, and if this occurs we will be happy to evaluate and treat her.  I also informed her that based on her history I felt this was unlikely to occur.      Please return here or go to the Emergency Department for any concerns or worsening of condition.  If you were prescribed antibiotics, please take them to completion.  If you were prescribed a narcotic medication, do not drive or operate heavy equipment or machinery while taking these medications.  Please follow up with your primary care doctor or specialist as needed.  Please drink plenty of fluids.  Please get plenty of rest.    Push fluids aggressively to improve symptoms and wash through the infection.  Cranberry juice can help relieve symptoms    We recommend avoiding sexual activity until your culture results come back.  To avoid reinfection, your sexual partner will need to be treated as well prior to resuming sexual activity.    We recommend always using barrier protection (condoms) during sexual activity.    If you  smoke, please stop smoking.    Please follow up with your primary care doctor or specialist as needed.    Sherice Ross, NP  [phone]      You must understand that you have received treatment at an Urgent Care facility only, and that you may be  released before all of your medical problems are known or treated. Urgent Care facilities are not equipped to  handle life threatening emergencies. It is recommended that you seek care at an Emergency Department for  further evaluation of worsening or concerning symptoms, or possibly life threatening conditions as  discussed.    Suspected STI, Culture Only  Your symptoms suggest that you may " have a sexually transmitted infection (STI). The most common bacteria that cause STIs are chlamydia and gonorrhea. Both are highly contagious. They are passed by sexual contact with an infected partner.  Symptoms begin within 1 to 3 weeks after exposure. There is usually a discharge from the penis or vagina and burning during urination. Many women with one of these infections will have only mild symptoms or no symptoms at all early in the disease.  Culture tests have been taken. These will show if you have an infection with chlamydia or gonorrhea. These infections can be treated and cured with antibiotic medicine.  Home care  Do not have sex until you know that your test result is negative.  If a culture was done, call for the results. If the culture is positive, contact your healthcare provider, local clinic, or local public health department to be treated, or return to our facility.  You will be prescribed antibiotic medicine. Be sure to take all of the antibiotic as prescribed until it is gone or you are told to stop. Keep taking it even if you feel better.  Both you and your sexual partner or partners need to be treated, even if the partner has no symptoms.  Do not have sex until both you and your partner or partners have finished all antibiotic medicine and you are told that you are no longer contagious.  Learn about safe sex practices and use these in the future. The safest sex is with a partner who has tested negative for STIs and only has sex with you. Condoms can help prevent the spread of gonorrhea and chlamydia, but are not a guarantee.  Follow-up care  Follow up with your healthcare provider or as advised by our staff. Call as directed for the results of your culture. If your culture test is positive and you are treated with an antibiotic, you should have another culture taken 4 to 6 weeks after treatment. This is to be sure the infection has cleared. Follow up with your provider or the public health  department for complete STI screening, including HIV testing. For more information about STIs, call the CDC information line at 124-070-3560.  When to seek medical advice  Call your healthcare provider if any of these occur:  Fever of 100.4ºF (38.0ºC) or higher, or as directed  New pain in your lower belly (abdomen) or back or pain that gets worse  Unexpected vaginal bleeding  Weakness, dizziness, or fainting  Repeated vomiting  Inability to urinate because of pain  Rash or joint pain  Painful open sores on the penis or around the outer vagina  Enlarged painful lumps (lymph nodes) in the groin  Testicle pain or scrotal swelling in men  Date Last Reviewed: 9/25/2015  © 8350-8203 [employer]. [address] [place] [address], [place] [address]. All rights reserved. This information is not intended as a substitute for professional medical care. Always follow your healthcare professional's instructions.

## 2025-04-19 NOTE — PATIENT INSTRUCTIONS
Please return here or go to the Emergency Department for any concerns or worsening of condition.  If you were prescribed antibiotics, please take them to completion.  If you were prescribed a narcotic medication, do not drive or operate heavy equipment or machinery while taking these medications.  Please follow up with your primary care doctor or specialist as needed.  Please drink plenty of fluids.  Please get plenty of rest.    Push fluids aggressively to improve symptoms and wash through the infection.  Cranberry juice can help relieve symptoms    We recommend avoiding sexual activity until your culture results come back.  To avoid reinfection, your sexual partner will need to be treated as well prior to resuming sexual activity.    We recommend always using barrier protection (condoms) during sexual activity.    If you  smoke, please stop smoking.    Please follow up with your primary care doctor or specialist as needed.    Sherice Ross, UNA  [phone]      You must understand that you have received treatment at an Urgent Care facility only, and that you may be  released before all of your medical problems are known or treated. Urgent Care facilities are not equipped to  handle life threatening emergencies. It is recommended that you seek care at an Emergency Department for  further evaluation of worsening or concerning symptoms, or possibly life threatening conditions as  discussed.    Suspected STI, Culture Only  Your symptoms suggest that you may have a sexually transmitted infection (STI). The most common bacteria that cause STIs are chlamydia and gonorrhea. Both are highly contagious. They are passed by sexual contact with an infected partner.  Symptoms begin within 1 to 3 weeks after exposure. There is usually a discharge from the penis or vagina and burning during urination. Many women with one of these infections will have only mild symptoms or no symptoms at all early in the  disease.  Culture tests have been taken. These will show if you have an infection with chlamydia or gonorrhea. These infections can be treated and cured with antibiotic medicine.  Home care  Do not have sex until you know that your test result is negative.  If a culture was done, call for the results. If the culture is positive, contact your healthcare provider, local clinic, or local public health department to be treated, or return to our facility.  You will be prescribed antibiotic medicine. Be sure to take all of the antibiotic as prescribed until it is gone or you are told to stop. Keep taking it even if you feel better.  Both you and your sexual partner or partners need to be treated, even if the partner has no symptoms.  Do not have sex until both you and your partner or partners have finished all antibiotic medicine and you are told that you are no longer contagious.  Learn about safe sex practices and use these in the future. The safest sex is with a partner who has tested negative for STIs and only has sex with you. Condoms can help prevent the spread of gonorrhea and chlamydia, but are not a guarantee.  Follow-up care  Follow up with your healthcare provider or as advised by our staff. Call as directed for the results of your culture. If your culture test is positive and you are treated with an antibiotic, you should have another culture taken 4 to 6 weeks after treatment. This is to be sure the infection has cleared. Follow up with your provider or the public health department for complete STI screening, including HIV testing. For more information about STIs, call the CDC information line at 330-703-3293.  When to seek medical advice  Call your healthcare provider if any of these occur:  Fever of 100.4ºF (38.0ºC) or higher, or as directed  New pain in your lower belly (abdomen) or back or pain that gets worse  Unexpected vaginal bleeding  Weakness, dizziness, or fainting  Repeated vomiting  Inability  to urinate because of pain  Rash or joint pain  Painful open sores on the penis or around the outer vagina  Enlarged painful lumps (lymph nodes) in the groin  Testicle pain or scrotal swelling in men  Date Last Reviewed: 9/25/2015  © 2884-8770 [employer]. [address] [place] [id number]. All rights reserved. This information is not intended as a substitute for professional medical care. Always follow your healthcare professional's instructions.